# Patient Record
Sex: MALE | Race: WHITE | NOT HISPANIC OR LATINO | Employment: FULL TIME | URBAN - METROPOLITAN AREA
[De-identification: names, ages, dates, MRNs, and addresses within clinical notes are randomized per-mention and may not be internally consistent; named-entity substitution may affect disease eponyms.]

---

## 2017-01-01 ENCOUNTER — APPOINTMENT (OUTPATIENT)
Dept: RADIOLOGY | Facility: IMAGING CENTER | Age: 42
End: 2017-01-01
Attending: NURSE PRACTITIONER
Payer: COMMERCIAL

## 2017-01-01 ENCOUNTER — OFFICE VISIT (OUTPATIENT)
Dept: URGENT CARE | Facility: CLINIC | Age: 42
End: 2017-01-01
Payer: COMMERCIAL

## 2017-01-01 VITALS
DIASTOLIC BLOOD PRESSURE: 86 MMHG | SYSTOLIC BLOOD PRESSURE: 142 MMHG | HEART RATE: 110 BPM | BODY MASS INDEX: 30.75 KG/M2 | HEIGHT: 72 IN | RESPIRATION RATE: 16 BRPM | OXYGEN SATURATION: 97 % | WEIGHT: 227 LBS | TEMPERATURE: 97.5 F

## 2017-01-01 DIAGNOSIS — M79.672 FOOT PAIN, LEFT: ICD-10-CM

## 2017-01-01 PROCEDURE — 99202 OFFICE O/P NEW SF 15 MIN: CPT | Performed by: NURSE PRACTITIONER

## 2017-01-01 PROCEDURE — 73630 X-RAY EXAM OF FOOT: CPT | Mod: TC | Performed by: NURSE PRACTITIONER

## 2017-01-01 ASSESSMENT — ENCOUNTER SYMPTOMS
FEVER: 0
CHILLS: 0

## 2017-01-01 NOTE — PROGRESS NOTES
Subjective:      Lauro Daley is a 41 y.o. male who presents with Foot Pain    Past Medical History   Diagnosis Date   • Asthma      as a child     Social History     Social History   • Marital Status:      Spouse Name: N/A   • Number of Children: N/A   • Years of Education: N/A     Occupational History   • Not on file.     Social History Main Topics   • Smoking status: Never Smoker    • Smokeless tobacco: Current User     Types: Chew   • Alcohol Use: No   • Drug Use: No   • Sexual Activity: Not on file     Other Topics Concern   • Not on file     Social History Narrative   • No narrative on file     No family history on file.    Allergies: keflex    Current Outpatient Prescriptions on File Prior to Visit   Medication Sig Dispense Refill   • predniSONE (DELTASONE) 10 MG TABS Take 1 Tab by mouth every day. Take with food 40 mg po daily X3, 30 mg daily X 3 , 20 mg daily X 3, 10 mg daily X 3 , 5 mg daily X 3. In tapering dose fashion. (Patient not taking: Reported on 1/1/2017) 32 Each 0   • albuterol (VENTOLIN OR PROVENTIL) 108 (90 BASE) MCG/ACT AERS Inhale 1-2 Puffs by mouth every four hours as needed for Shortness of Breath (wheeze). (Patient not taking: Reported on 1/1/2017) 1 Inhaler 3   • azithromycin (ZITHROMAX) 500 MG tablet Take 0.5 Tabs by mouth every day. (Patient not taking: Reported on 1/1/2017) 4 Tab 0     No current facility-administered medications on file prior to visit.     Past Surgical History   Procedure Laterality Date   • Other       sleep apnea surgery     Patient presents with complaint of pain to the lateral aspect of the left foot over the last 2 months. He states this has become worse and has affected walking, as he has had pain with weightbearing. He denies knowing of any injury. He denies having had this type of pain before.          Other  This is a new problem. The current episode started more than 1 month ago. The problem occurs constantly. The problem has been gradually  worsening. Pertinent negatives include no chills or fever. Associated symptoms comments: Pain to the lateral aspect of the left foot. Nothing aggravates the symptoms. He has tried nothing for the symptoms. The treatment provided no relief.       Review of Systems   Constitutional: Negative for fever and chills.   Musculoskeletal:        Pain to the lateral left foot        All other systems reviewed and are negative      Objective:     /86 mmHg  Pulse 110  Temp(Src) 36.4 °C (97.5 °F)  Resp 16  Ht 1.829 m (6')  Wt 102.967 kg (227 lb)  BMI 30.78 kg/m2  SpO2 97%     Physical Exam   Constitutional: He is oriented to person, place, and time. He appears well-developed and well-nourished. No distress.   Musculoskeletal:        Left ankle: Tenderness.        Feet:    Neurological: He is alert and oriented to person, place, and time.   Skin: Skin is warm and dry. He is not diaphoretic.   Psychiatric: He has a normal mood and affect. His behavior is normal.   Vitals reviewed.    XR foot:      1/1/2017 2:34 PM    HISTORY/REASON FOR EXAM:  Left foot 5th metatarsal pain for 2 months      TECHNIQUE/EXAM DESCRIPTION AND NUMBER OF VIEWS:  3 views of the LEFT foot.    COMPARISON:  None    FINDINGS:    There is no focal soft tissue swelling.    There is no evidence of displaced fracture or dislocation.    The alignment is maintained.    There is an incidental bipartite lateral 1st MTP joint sesamoid.         Impression        1.  Unremarkable left foot series.               Assessment/Plan:     1. Foot pain, left    - DX-FOOT-COMPLETE 3+ LEFT; Future  -RICE  -ACE wrap; patient declined and states he will purchase a foot brace  -ibuprofen  -return for persistent or worsening of symptoms.

## 2017-01-01 NOTE — MR AVS SNAPSHOT
Lauro Alvarez Edi   2017 2:15 PM   Office Visit   MRN: [id number]    Department:  [facility]   Dept Phone:  [phone]    Description:  Male : 1975   Provider:  Cathey J Hamman, A.P.N.           Reason for Visit     Foot Pain x 2 months; L Foot, no hx of injury      Allergies as of 2017     No Known Allergies      You were diagnosed with     Foot pain, left   [[id number]]         Vital Signs     Blood Pressure Pulse Temperature Respirations Height Weight    142/86 mmHg 110 36.4 °C (97.5 °F) 16 1.829 m (6') 102.967 kg (227 lb)    Body Mass Index Oxygen Saturation Smoking Status             30.78 kg/m2 97% Never Smoker          Basic Information     Date Of Birth Sex Race Ethnicity Preferred Language    1975 Male White Non- English      Problem List              ICD-10-CM Priority Class Noted - Resolved    Hypoxia R09.02   2014 - Present    Asthma exacerbation J45.901   2014 - Present      Health Maintenance        Date Due Completion Dates    IMM DTaP/Tdap/Td Vaccine (1 - Tdap) 10/5/1992 ---    IMM PNEUMOCOCCAL 19-64 (ADULT) MEDIUM RISK SERIES (1 of 1 - PPSV23) 10/5/1992 ---    IMM INFLUENZA (1) 2016 ---            Current Immunizations     No immunizations on file.      Below and/or attached are the medications your provider expects you to take. Review all of your home medications and newly ordered medications with your provider and/or pharmacist. Follow medication instructions as directed by your provider and/or pharmacist. Please keep your medication list with you and share with your provider. Update the information when medications are discontinued, doses are changed, or new medications (including over-the-counter products) are added; and carry medication information at all times in the event of emergency situations     Allergies:  No Known Allergies          Medications  Valid as of: 2017 -  3:19 PM    Generic Name Brand Name Tablet Size  Instructions for use    Albuterol Sulfate (Aero Soln) albuterol 108 (90 BASE) MCG/ACT Inhale 1-2 Puffs by mouth every four hours as needed for Shortness of Breath (wheeze).        Azithromycin (Tab) ZITHROMAX 500 MG Take 0.5 Tabs by mouth every day.        PredniSONE (Tab) DELTASONE 10 MG Take 1 Tab by mouth every day. Take with food 40 mg po daily X3, 30 mg daily X 3 , 20 mg daily X 3, 10 mg daily X 3 , 5 mg daily X 3. In tapering dose fashion.        .                 Medicines prescribed today were sent to:     [facility]/PHARMACY #[id number] - [place], [place] - [address]    [address] [place] [address]    Phone: [phone] Fax: [phone]    Open 24 Hours?: No      Medication refill instructions:       If your prescription bottle indicates you have medication refills left, it is not necessary to call your provider’s office. Please contact your pharmacy and they will refill your medication.    If your prescription bottle indicates you do not have any refills left, you may request refills at any time through one of the following ways: The online Babytree system (except Urgent Care), by calling your provider’s office, or by asking your pharmacy to contact your provider’s office with a refill request. Medication refills are processed only during regular business hours and may not be available until the next business day. Your provider may request additional information or to have a follow-up visit with you prior to refilling your medication.   *Please Note: Medication refills are assigned a new Rx number when refilled electronically. Your pharmacy may indicate that no refills were authorized even though a new prescription for the same medication is available at the pharmacy. Please request the medicine by name with the pharmacy before contacting your provider for a refill.        Your To Do List     Future Labs/Procedures Complete By Expires    DX-FOOT-COMPLETE 3+ LEFT  As directed 1/1/2018         Babytree Access  Code: [email or web address]  Expires: 1/31/2017  3:19 PM    K Spine  A secure, online tool to manage your health information     [employer]’s K Spine® is a secure, online tool that connects you to your personalized health information from the privacy of your home -- day or night - making it very easy for you to manage your healthcare. Once the activation process is completed, you can even access your medical information using the K Spine shad, which is available for free in the Apple Shad store or Google Play store.     K Spine provides the following levels of access (as shown below):   My Chart Features   [facility] Primary Care Doctor [facility]  Specialists [facility]  Urgent  Care Non-[facility]  Primary Care  Doctor   Email your healthcare team securely and privately 24/7 X X X    Manage appointments: schedule your next appointment; view details of past/upcoming appointments X      Request prescription refills. X      View recent personal medical records, including lab and immunizations X X X X   View health record, including health history, allergies, medications X X X X   Read reports about your outpatient visits, procedures, consult and ER notes X X X X   See your discharge summary, which is a recap of your hospital and/or ER visit that includes your diagnosis, lab results, and care plan. X X       How to register for K Spine:  1. Go to  [email or web address].  2. Click on the Sign Up Now box, which takes you to the New Member Sign Up page. You will need to provide the following information:  a. Enter your K Spine Access Code exactly as it appears at the top of this page. (You will not need to use this code after you’ve completed the sign-up process. If you do not sign up before the expiration date, you must request a new code.)   b. Enter your date of birth.   c. Enter your home email address.   d. Click Submit, and follow the next screen’s instructions.  3. Create a K Spine ID. This will be your K Spine login ID and  cannot be changed, so think of one that is secure and easy to remember.  4. Create a [employer] password. You can change your password at any time.  5. Enter your Password Reset Question and Answer. This can be used at a later time if you forget your password.   6. Enter your e-mail address. This allows you to receive e-mail notifications when new information is available in [employer].  7. Click Sign Up. You can now view your health information.    For assistance activating your [employer] account, call [phone]

## 2017-03-15 ENCOUNTER — APPOINTMENT (OUTPATIENT)
Dept: RADIOLOGY | Facility: IMAGING CENTER | Age: 42
End: 2017-03-15
Attending: NURSE PRACTITIONER
Payer: COMMERCIAL

## 2017-03-15 ENCOUNTER — OFFICE VISIT (OUTPATIENT)
Dept: URGENT CARE | Facility: CLINIC | Age: 42
End: 2017-03-15
Payer: COMMERCIAL

## 2017-03-15 VITALS
TEMPERATURE: 98.6 F | SYSTOLIC BLOOD PRESSURE: 120 MMHG | OXYGEN SATURATION: 95 % | RESPIRATION RATE: 16 BRPM | WEIGHT: 215 LBS | HEART RATE: 105 BPM | BODY MASS INDEX: 29.12 KG/M2 | HEIGHT: 72 IN | DIASTOLIC BLOOD PRESSURE: 80 MMHG

## 2017-03-15 DIAGNOSIS — R10.9 ACUTE LEFT FLANK PAIN: ICD-10-CM

## 2017-03-15 DIAGNOSIS — K59.00 CONSTIPATION, UNSPECIFIED CONSTIPATION TYPE: ICD-10-CM

## 2017-03-15 LAB
APPEARANCE UR: CLEAR
BILIRUB UR STRIP-MCNC: NORMAL MG/DL
COLOR UR AUTO: NORMAL
GLUCOSE UR STRIP.AUTO-MCNC: NORMAL MG/DL
KETONES UR STRIP.AUTO-MCNC: NORMAL MG/DL
LEUKOCYTE ESTERASE UR QL STRIP.AUTO: NORMAL
NITRITE UR QL STRIP.AUTO: NORMAL
PH UR STRIP.AUTO: 6.5 [PH] (ref 5–8)
PROT UR QL STRIP: NORMAL MG/DL
RBC UR QL AUTO: NORMAL
SP GR UR STRIP.AUTO: 1.02
UROBILINOGEN UR STRIP-MCNC: NORMAL MG/DL

## 2017-03-15 PROCEDURE — 74000 DX-ABDOMEN-1 VIEW: CPT | Mod: TC | Performed by: NURSE PRACTITIONER

## 2017-03-15 PROCEDURE — 99214 OFFICE O/P EST MOD 30 MIN: CPT | Performed by: NURSE PRACTITIONER

## 2017-03-15 PROCEDURE — 81002 URINALYSIS NONAUTO W/O SCOPE: CPT | Performed by: NURSE PRACTITIONER

## 2017-03-15 ASSESSMENT — ENCOUNTER SYMPTOMS
ABDOMINAL PAIN: 1
DIARRHEA: 0
BLOOD IN STOOL: 0
COUGH: 0
MYALGIAS: 1
CONSTIPATION: 0
SHORTNESS OF BREATH: 0
BACK PAIN: 1
WEAKNESS: 0
NAUSEA: 1
FEVER: 0
FLANK PAIN: 1
CHILLS: 0
VOMITING: 0
ORTHOPNEA: 0
PALPITATIONS: 0

## 2017-03-15 NOTE — MR AVS SNAPSHOT
Lauro Alvarez Edi   3/15/2017 3:15 PM   Office Visit   MRN: [id number]    Department:  [facility]   Dept Phone:  [phone]    Description:  Male : 1975   Provider:  RACIEL Martinez           Reason for Visit     Flank Pain past 2days, started left back side radiating to front now, painful, not sleeping and making nauseous      Allergies as of 3/15/2017     Allergen Noted Reactions    Sulfa Drugs 03/15/2017   Rash    body      You were diagnosed with     Acute left flank pain   [[id number]]         Vital Signs     Blood Pressure Pulse Temperature Respirations Height Weight    120/80 mmHg 105 37 °C (98.6 °F) 16 1.829 m (6') 97.523 kg (215 lb)    Body Mass Index Oxygen Saturation Smoking Status             29.15 kg/m2 95% Never Smoker          Basic Information     Date Of Birth Sex Race Ethnicity Preferred Language    1975 Male White Non- English      Problem List              ICD-10-CM Priority Class Noted - Resolved    Hypoxia R09.02   2014 - Present    Asthma exacerbation J45.901   2014 - Present      Health Maintenance        Date Due Completion Dates    IMM DTaP/Tdap/Td Vaccine (1 - Tdap) 10/5/1992 ---    IMM PNEUMOCOCCAL 19-64 (ADULT) MEDIUM RISK SERIES (1 of 1 - PPSV23) 10/5/1992 ---    IMM INFLUENZA (1) 2016 ---            Results     POCT Urinalysis      Component Value Standard Range & Units    POC Color D.YELLOW Negative    POC Appearance CLEAR Negative    POC Leukocyte Esterase TRACE Negative    POC Nitrites NEG Negative    POC Urobiligen NEG Negative (0.2) mg/dL    POC Protein TRACE Negative mg/dL    POC Urine PH 6.5 5.0 - 8.0    POC Blood NEG Negative    POC Specific Gravity 1.020 <1.005 - >1.030    POC Ketones NEG Negative mg/dL    POC Biliruben NEG Negative mg/dL    POC Glucose NEG Negative mg/dL                        Current Immunizations     No immunizations on file.      Below and/or attached are the medications your provider expects you  to take. Review all of your home medications and newly ordered medications with your provider and/or pharmacist. Follow medication instructions as directed by your provider and/or pharmacist. Please keep your medication list with you and share with your provider. Update the information when medications are discontinued, doses are changed, or new medications (including over-the-counter products) are added; and carry medication information at all times in the event of emergency situations     Allergies:  SULFA DRUGS - Rash               Medications  Valid as of: March 15, 2017 -  4:17 PM    Generic Name Brand Name Tablet Size Instructions for use    Albuterol Sulfate (Aero Soln) albuterol 108 (90 BASE) MCG/ACT Inhale 1-2 Puffs by mouth every four hours as needed for Shortness of Breath (wheeze).        Azithromycin (Tab) ZITHROMAX 500 MG Take 0.5 Tabs by mouth every day.        PredniSONE (Tab) DELTASONE 10 MG Take 1 Tab by mouth every day. Take with food 40 mg po daily X3, 30 mg daily X 3 , 20 mg daily X 3, 10 mg daily X 3 , 5 mg daily X 3. In tapering dose fashion.        .                 Medicines prescribed today were sent to:     [facility]/PHARMACY #[id number] - [place], [place] - [address]    [address] [place] [address]    Phone: [phone] Fax: [phone]    Open 24 Hours?: No      Medication refill instructions:       If your prescription bottle indicates you have medication refills left, it is not necessary to call your provider’s office. Please contact your pharmacy and they will refill your medication.    If your prescription bottle indicates you do not have any refills left, you may request refills at any time through one of the following ways: The online Optimus system (except Urgent Care), by calling your provider’s office, or by asking your pharmacy to contact your provider’s office with a refill request. Medication refills are processed only during regular business hours and may not be available  until the next business day. Your provider may request additional information or to have a follow-up visit with you prior to refilling your medication.   *Please Note: Medication refills are assigned a new Rx number when refilled electronically. Your pharmacy may indicate that no refills were authorized even though a new prescription for the same medication is available at the pharmacy. Please request the medicine by name with the pharmacy before contacting your provider for a refill.        Your To Do List     Future Labs/Procedures Complete By Expires    [id number] VIEW  As directed 9/15/2017         Movigo Access Code: [email or web address]  Expires: 3/25/2017  1:19 PM    Movigo  A secure, online tool to manage your health information     [employer]’s Movigo® is a secure, online tool that connects you to your personalized health information from the privacy of your home -- day or night - making it very easy for you to manage your healthcare. Once the activation process is completed, you can even access your medical information using the Movigo shad, which is available for free in the Apple Shad store or Google Play store.     Movigo provides the following levels of access (as shown below):   My Chart Features   [facility] Primary Care Doctor [facility]  Specialists [facility]  Urgent  Care Non-[facility]  Primary Care  Doctor   Email your healthcare team securely and privately 24/7 X X X    Manage appointments: schedule your next appointment; view details of past/upcoming appointments X      Request prescription refills. X      View recent personal medical records, including lab and immunizations X X X X   View health record, including health history, allergies, medications X X X X   Read reports about your outpatient visits, procedures, consult and ER notes X X X X   See your discharge summary, which is a recap of your hospital and/or ER visit that includes your diagnosis, lab results, and care plan. X X       How to  register for XebiaLabs:  1. Go to  [email or web address].  2. Click on the Sign Up Now box, which takes you to the New Member Sign Up page. You will need to provide the following information:  a. Enter your XebiaLabs Access Code exactly as it appears at the top of this page. (You will not need to use this code after you’ve completed the sign-up process. If you do not sign up before the expiration date, you must request a new code.)   b. Enter your date of birth.   c. Enter your home email address.   d. Click Submit, and follow the next screen’s instructions.  3. Create a [employer] ID. This will be your XebiaLabs login ID and cannot be changed, so think of one that is secure and easy to remember.  4. Create a [employer] password. You can change your password at any time.  5. Enter your Password Reset Question and Answer. This can be used at a later time if you forget your password.   6. Enter your e-mail address. This allows you to receive e-mail notifications when new information is available in XebiaLabs.  7. Click Sign Up. You can now view your health information.    For assistance activating your XebiaLabs account, call [phone]

## 2017-07-21 PROBLEM — D23.5 OTHER BENIGN NEOPLASM OF SKIN OF TRUNK: Status: ACTIVE | Noted: 2017-07-21

## 2017-10-23 ENCOUNTER — OFFICE VISIT (OUTPATIENT)
Dept: URGENT CARE | Facility: CLINIC | Age: 42
End: 2017-10-23
Payer: COMMERCIAL

## 2017-10-23 VITALS
RESPIRATION RATE: 16 BRPM | HEIGHT: 72 IN | HEART RATE: 108 BPM | WEIGHT: 236 LBS | SYSTOLIC BLOOD PRESSURE: 154 MMHG | OXYGEN SATURATION: 95 % | TEMPERATURE: 98.6 F | DIASTOLIC BLOOD PRESSURE: 100 MMHG | BODY MASS INDEX: 31.97 KG/M2

## 2017-10-23 DIAGNOSIS — J01.90 ACUTE NON-RECURRENT SINUSITIS, UNSPECIFIED LOCATION: ICD-10-CM

## 2017-10-23 DIAGNOSIS — H69.93 DYSFUNCTION OF BOTH EUSTACHIAN TUBES: ICD-10-CM

## 2017-10-23 DIAGNOSIS — J20.9 ACUTE BRONCHITIS, UNSPECIFIED ORGANISM: ICD-10-CM

## 2017-10-23 DIAGNOSIS — H10.12 ALLERGIC CONJUNCTIVITIS OF LEFT EYE: ICD-10-CM

## 2017-10-23 PROCEDURE — 99214 OFFICE O/P EST MOD 30 MIN: CPT | Performed by: FAMILY MEDICINE

## 2017-10-23 RX ORDER — AMOXICILLIN AND CLAVULANATE POTASSIUM 875; 125 MG/1; MG/1
1 TABLET, FILM COATED ORAL 2 TIMES DAILY
Qty: 20 TAB | Refills: 0 | Status: SHIPPED | OUTPATIENT
Start: 2017-10-23 | End: 2017-11-02

## 2017-11-15 ENCOUNTER — OFFICE VISIT (OUTPATIENT)
Dept: URGENT CARE | Facility: CLINIC | Age: 42
End: 2017-11-15
Payer: COMMERCIAL

## 2017-11-15 VITALS
HEART RATE: 100 BPM | DIASTOLIC BLOOD PRESSURE: 70 MMHG | TEMPERATURE: 99.1 F | WEIGHT: 237 LBS | RESPIRATION RATE: 20 BRPM | OXYGEN SATURATION: 96 % | HEIGHT: 72 IN | BODY MASS INDEX: 32.1 KG/M2 | SYSTOLIC BLOOD PRESSURE: 110 MMHG

## 2017-11-15 DIAGNOSIS — R20.2 PARESTHESIA AND PAIN OF BOTH UPPER EXTREMITIES: ICD-10-CM

## 2017-11-15 DIAGNOSIS — M79.601 PARESTHESIA AND PAIN OF BOTH UPPER EXTREMITIES: ICD-10-CM

## 2017-11-15 DIAGNOSIS — M79.602 PARESTHESIA AND PAIN OF BOTH UPPER EXTREMITIES: ICD-10-CM

## 2017-11-15 PROCEDURE — 99213 OFFICE O/P EST LOW 20 MIN: CPT | Performed by: PHYSICIAN ASSISTANT

## 2017-11-15 ASSESSMENT — ENCOUNTER SYMPTOMS
MYALGIAS: 0
DOUBLE VISION: 0
BLURRED VISION: 0
HEADACHES: 0
COUGH: 0
PALPITATIONS: 0
SHORTNESS OF BREATH: 0
SENSORY CHANGE: 1
FOCAL WEAKNESS: 0
CHILLS: 0
FEVER: 0
TINGLING: 1
NAUSEA: 0
VOMITING: 0
DIZZINESS: 0

## 2017-11-16 NOTE — PROGRESS NOTES
Subjective:      Lauro Daley is a 42 y.o. male who presents with Numbness (Couple days ago hands and forearm numbness)            Patient reports intermittent numbness and pain in upper arms for the past year. He notes worsening pain over the past week especially in his right arm. He denies any recent injury. He complains of pain and tingling in his arms extending from his elbows into his hands. 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 He states his right arm is worse than his left. He is right handed. He denies alcohol use or smoking. He does chew tobacco. He states the pain is worse at night when he sleeps. Ibuprofen has helped his pain. He denies paralysis or weakness.      Past Medical History:   Diagnosis Date   • Asthma     as a child     ,  Past Surgical History:   Procedure Laterality Date   • OTHER      sleep apnea surgery       No family history on file.    Allergies   Allergen Reactions   • Sulfa Drugs Rash     body       Medications, Allergies, and current problem list reviewed today in Epic    Review of Systems   Constitutional: Negative for chills, fever and malaise/fatigue.   Eyes: Negative for blurred vision and double vision.   Respiratory: Negative for cough and shortness of breath.    Cardiovascular: Negative for chest pain, palpitations and leg swelling.   Gastrointestinal: Negative for nausea and vomiting.   Musculoskeletal: Negative for joint pain and myalgias.   Skin: Negative for rash.   Neurological: Positive for tingling and sensory change. Negative for dizziness, focal weakness and headaches.        Tingling and numbness in arms bilaterally with pain     All other systems reviewed and are negative.        Objective:     /70   Pulse 100   Temp 37.3 °C (99.1 °F)   Resp 20   Ht 1.829 m (6')   Wt 107.5 kg (237 lb)   SpO2 96%   BMI 32.14 kg/m²      Physical Exam   Constitutional: He is oriented to person, place, and time. He appears  well-developed and well-nourished. No distress.   Eyes: Conjunctivae are normal.   Cardiovascular: Normal rate, regular rhythm and normal heart sounds.  Exam reveals no gallop and no friction rub.    No murmur heard.  Pulmonary/Chest: Effort normal and breath sounds normal. No respiratory distress. He has no wheezes. He has no rales.   Musculoskeletal:        Right elbow: Normal.       Left elbow: Normal.        Right wrist: Normal.        Left wrist: Normal.   Bilateral upper extremities- FROM. Distal n/v intact. Pain with Tinel's over right cubital tunnel.  Pain in distribution of ulnar and median nerve on both hands.  strength normal bilaterally.    Neurological: He is alert and oriented to person, place, and time. No cranial nerve deficit.   Psychiatric: He has a normal mood and affect. His behavior is normal. Judgment and thought content normal.               Assessment/Plan:     1. Paresthesia and pain of both upper extremities  - patient presents with characteristic cubital tunnel syndrome and carpal tunnel syndrome bilaterally  - Advised OTC NSAIDS  - he was given wrist braces bilaterally.  - encouraged OTC right elbow sleeve  - will have patient follow-up with Ortho.   - REFERRAL TO ORTHOPEDICS     Differential diagnoses, Supportive care, and indications for immediate follow-up discussed with patient.   Instructed to return to clinic or nearest emergency department for any change in condition, further concerns, or worsening of symptoms.    The patient demonstrated a good understanding and agreed with the treatment plan.    Sharmila Kaye P.A.-C.

## 2018-06-24 ENCOUNTER — APPOINTMENT (OUTPATIENT)
Dept: RADIOLOGY | Facility: IMAGING CENTER | Age: 43
End: 2018-06-24
Attending: PHYSICIAN ASSISTANT
Payer: COMMERCIAL

## 2018-06-24 ENCOUNTER — OFFICE VISIT (OUTPATIENT)
Dept: URGENT CARE | Facility: CLINIC | Age: 43
End: 2018-06-24
Payer: COMMERCIAL

## 2018-06-24 VITALS
WEIGHT: 235 LBS | HEIGHT: 72 IN | SYSTOLIC BLOOD PRESSURE: 120 MMHG | BODY MASS INDEX: 31.83 KG/M2 | RESPIRATION RATE: 18 BRPM | TEMPERATURE: 99.1 F | OXYGEN SATURATION: 95 % | DIASTOLIC BLOOD PRESSURE: 80 MMHG | HEART RATE: 100 BPM

## 2018-06-24 DIAGNOSIS — J01.00 ACUTE NON-RECURRENT MAXILLARY SINUSITIS: ICD-10-CM

## 2018-06-24 DIAGNOSIS — R05.9 COUGH: ICD-10-CM

## 2018-06-24 DIAGNOSIS — E66.9 OBESITY (BMI 30-39.9): ICD-10-CM

## 2018-06-24 PROCEDURE — 99214 OFFICE O/P EST MOD 30 MIN: CPT | Performed by: PHYSICIAN ASSISTANT

## 2018-06-24 PROCEDURE — 71046 X-RAY EXAM CHEST 2 VIEWS: CPT | Mod: TC,FY | Performed by: PHYSICIAN ASSISTANT

## 2018-06-24 RX ORDER — AMOXICILLIN AND CLAVULANATE POTASSIUM 875; 125 MG/1; MG/1
1 TABLET, FILM COATED ORAL 2 TIMES DAILY
Qty: 14 TAB | Refills: 0 | Status: SHIPPED | OUTPATIENT
Start: 2018-06-24 | End: 2018-07-01

## 2018-06-24 ASSESSMENT — ENCOUNTER SYMPTOMS
CHILLS: 0
SPUTUM PRODUCTION: 1
SINUS PRESSURE: 1
PALPITATIONS: 0
WHEEZING: 0
SORE THROAT: 1
SHORTNESS OF BREATH: 0
HEMOPTYSIS: 0
FEVER: 0
COUGH: 1

## 2018-06-24 ASSESSMENT — PATIENT HEALTH QUESTIONNAIRE - PHQ9: CLINICAL INTERPRETATION OF PHQ2 SCORE: 0

## 2018-06-24 NOTE — PROGRESS NOTES
Subjective:      Lauro Daley is a 42 y.o. male who presents with Cough (Almost 10 days stuffy nose , dry cough .)            Sinus Problem   This is a new problem. The current episode started 1 to 4 weeks ago. The problem is unchanged. The pain is moderate. Associated symptoms include congestion, coughing, sinus pressure and a sore throat. Pertinent negatives include no chills, ear pain or shortness of breath. Past treatments include antibiotics and oral decongestants. The treatment provided no relief.       Review of Systems   Constitutional: Positive for malaise/fatigue. Negative for chills and fever.   HENT: Positive for congestion, sinus pressure and sore throat. Negative for ear pain.    Respiratory: Positive for cough and sputum production. Negative for hemoptysis, shortness of breath and wheezing.    Cardiovascular: Negative for chest pain and palpitations.   All other systems reviewed and are negative.    PMH:  has a past medical history of Asthma.  MEDS:   Current Outpatient Prescriptions:   •  amoxicillin-clavulanate (AUGMENTIN) 875-125 MG Tab, Take 1 Tab by mouth 2 times a day for 7 days., Disp: 14 Tab, Rfl: 0  •  Hydrocod Polst-CPM Polst ER (TUSSIONEX PENNKINETIC ER) 10-8 MG/5ML Suspension Extended Release, Take 5 mL by mouth every 12 hours for 10 days., Disp: 100 mL, Rfl: 0  •  Hydrocod Polst-CPM Polst ER (TUSSIONEX) 10-8 MG/5ML Suspension Extended Release, Take 5 mL by mouth every 12 hours as needed for Cough or Rhinitis. Will cause sedation, avoid driving, operating heavy machinery, and drinking alcohol (Patient not taking: Reported on 6/24/2018), Disp: 60 mL, Rfl: 0  •  predniSONE (DELTASONE) 10 MG TABS, Take 1 Tab by mouth every day. Take with food 40 mg po daily X3, 30 mg daily X 3 , 20 mg daily X 3, 10 mg daily X 3 , 5 mg daily X 3. In tapering dose fashion. (Patient not taking: Reported on 6/24/2018), Disp: 32 Each, Rfl: 0  •  albuterol (VENTOLIN OR PROVENTIL) 108 (90 BASE) MCG/ACT  AERS, Inhale 1-2 Puffs by mouth every four hours as needed for Shortness of Breath (wheeze). (Patient not taking: Reported on 6/24/2018), Disp: 1 Inhaler, Rfl: 3  •  azithromycin (ZITHROMAX) 500 MG tablet, Take 0.5 Tabs by mouth every day. (Patient not taking: Reported on 6/24/2018), Disp: 4 Tab, Rfl: 0  ALLERGIES:   Allergies   Allergen Reactions   • Sulfa Drugs Rash     body     SURGHX:   Past Surgical History:   Procedure Laterality Date   • OTHER      sleep apnea surgery     SOCHX:  reports that he has never smoked. His smokeless tobacco use includes Chew. He reports that he does not drink alcohol or use drugs.  FH: Family history was reviewed, no pertinent findings to report  Medications, Allergies, and current problem list reviewed today in Epic       Objective:     /80   Pulse 100   Temp 37.3 °C (99.1 °F)   Resp 18   Ht 1.829 m (6')   Wt 106.6 kg (235 lb)   SpO2 95%   BMI 31.87 kg/m²      Physical Exam   Constitutional: He is oriented to person, place, and time. He appears well-developed and well-nourished.  Non-toxic appearance. He does not have a sickly appearance. He does not appear ill. No distress.   HENT:   Head: Normocephalic and atraumatic.   Right Ear: Hearing, tympanic membrane, external ear and ear canal normal.   Left Ear: Hearing, tympanic membrane, external ear and ear canal normal.   Nose: Mucosal edema and rhinorrhea present. No sinus tenderness. No epistaxis. Right sinus exhibits maxillary sinus tenderness. Left sinus exhibits maxillary sinus tenderness.   Mouth/Throat: Uvula is midline, oropharynx is clear and moist and mucous membranes are normal.   Eyes: Conjunctivae and EOM are normal.   Neck: Normal range of motion. Neck supple.   Cardiovascular: Normal rate, regular rhythm, normal heart sounds and intact distal pulses.    Pulmonary/Chest: Effort normal and breath sounds normal.   Neurological: He is alert and oriented to person, place, and time.   Skin: Skin is warm and  dry.   Psychiatric: He has a normal mood and affect. His behavior is normal. Judgment and thought content normal.   Vitals reviewed.           6/24/2018 12:19 PM    HISTORY/REASON FOR EXAM:  Cough  Congestion    TECHNIQUE/EXAM DESCRIPTION AND NUMBER OF VIEWS:  Two views of the chest.    COMPARISON:  4/13/2014    FINDINGS:  Cardiomediastinal contour is within normal limits.  Lungs show hypoinflation.  No focal pulmonary consolidation.  No pleural fluid collection or pneumothorax.  Nodular density at the RIGHT lung base measuring approximately 9 mm.  No major bony abnormality is seen.   Impression       1.  Hypoinflation without other evidence for acute cardiopulmonary disease.  2.  RIGHT lung base nodule versus nipple shadow.  Recommend confirmation with followup chest x-ray with nipple markers and shallow oblique views upon resolution of patient's acute illness.        Assessment/Plan:   Patient is a 42-year-old male who presents with sinus congestion and cough for 2 weeks. He was seen by telemedicine was prescribed azithromycin for his symptoms. He reports worsening symptoms without the help of medications. He has a past medical history of pneumonia. Vitals normal lungs are clear to auscultation there is some tenderness with palpation of the sinuses. X-ray was done which was negative there was incidental finding of a possible nodule. He states that he has nodules throughout his entire body and is not on anything. We will treat for sinusitis.  1. Cough    - DX-CHEST-2 VIEWS; Future  - amoxicillin-clavulanate (AUGMENTIN) 875-125 MG Tab; Take 1 Tab by mouth 2 times a day for 7 days.  Dispense: 14 Tab; Refill: 0  - Hydrocod Polst-CPM Polst ER (TUSSIONEX PENNKINETIC ER) 10-8 MG/5ML Suspension Extended Release; Take 5 mL by mouth every 12 hours for 10 days.  Dispense: 100 mL; Refill: 0    2. Acute non-recurrent maxillary sinusitis    - DX-CHEST-2 VIEWS; Future  - amoxicillin-clavulanate (AUGMENTIN) 875-125 MG Tab; Take 1  Tab by mouth 2 times a day for 7 days.  Dispense: 14 Tab; Refill: 0  - Hydrocod Polst-CPM Polst ER (TUSSIONEX PENNKINETIC ER) 10-8 MG/5ML Suspension Extended Release; Take 5 mL by mouth every 12 hours for 10 days.  Dispense: 100 mL; Refill: 0    3. Obesity (BMI 30-39.9)    - Patient identified as having weight management issue.  Appropriate orders and counseling given.    Differential diagnosis, natural history, supportive care discussed. Follow-up with primary care provider within 7-10 days, emergency room precautions discussed.  Patient and/or family appears understanding of information.  Handout and review of patients diagnosis and treatment was discussed extensively.

## 2018-09-16 ENCOUNTER — OFFICE VISIT (OUTPATIENT)
Dept: URGENT CARE | Facility: CLINIC | Age: 43
End: 2018-09-16
Payer: COMMERCIAL

## 2018-09-16 ENCOUNTER — HOSPITAL ENCOUNTER (OUTPATIENT)
Facility: MEDICAL CENTER | Age: 43
End: 2018-09-16
Attending: FAMILY MEDICINE
Payer: COMMERCIAL

## 2018-09-16 VITALS
HEART RATE: 104 BPM | BODY MASS INDEX: 31.83 KG/M2 | TEMPERATURE: 98 F | HEIGHT: 72 IN | OXYGEN SATURATION: 98 % | WEIGHT: 235 LBS | RESPIRATION RATE: 14 BRPM | SYSTOLIC BLOOD PRESSURE: 154 MMHG | DIASTOLIC BLOOD PRESSURE: 94 MMHG
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
## 2018-09-16 DIAGNOSIS — L03.012 PARONYCHIA OF FINGER, LEFT: ICD-10-CM

## 2018-09-16 PROCEDURE — 99000 SPECIMEN HANDLING OFFICE-LAB: CPT | Performed by: FAMILY MEDICINE

## 2018-09-16 PROCEDURE — 87077 CULTURE AEROBIC IDENTIFY: CPT | Mod: 91

## 2018-09-16 PROCEDURE — 87070 CULTURE OTHR SPECIMN AEROBIC: CPT

## 2018-09-16 PROCEDURE — 87186 SC STD MICRODIL/AGAR DIL: CPT

## 2018-09-16 PROCEDURE — 99214 OFFICE O/P EST MOD 30 MIN: CPT | Performed by: FAMILY MEDICINE

## 2018-09-16 RX ORDER — CIPROFLOXACIN 500 MG/1
500 TABLET, FILM COATED ORAL 2 TIMES DAILY
Qty: 10 TAB | Refills: 0 | Status: SHIPPED | OUTPATIENT
Start: 2018-09-16 | End: 2018-09-21

## 2018-09-16 RX ORDER — AMOXICILLIN AND CLAVULANATE POTASSIUM 875; 125 MG/1; MG/1
1 TABLET, FILM COATED ORAL 2 TIMES DAILY
Qty: 10 TAB | Refills: 0 | Status: SHIPPED | OUTPATIENT
Start: 2018-09-16 | End: 2018-09-21

## 2018-09-16 NOTE — PROGRESS NOTES
Subjective:      Lauro Daley is a 42 y.o. male who presents with Finger Pain (x2days ago possible infection painful, pig bit him x1wk ago)      - This is a very pleasant, well and non-toxic appearing 42 y.o. male with complaints of Lt ring finger infected. Says 2 wks ago his pet tea-cup pig bit him in that area when he was trying to put a leash on pig for 1st time.     Unsure of last td booster but says he does not want one today           ALLERGIES:  Sulfa drugs     PMH:  Past Medical History:   Diagnosis Date   • Asthma     as a child        MEDS:    Current Outpatient Prescriptions:   •  ciprofloxacin (CIPRO) 500 MG Tab, Take 1 Tab by mouth 2 times a day for 5 days., Disp: 10 Tab, Rfl: 0  •  amoxicillin-clavulanate (AUGMENTIN) 875-125 MG Tab, Take 1 Tab by mouth 2 times a day for 5 days., Disp: 10 Tab, Rfl: 0    ** I have documented what I find to be significant in regards to past medical, social, family and surgical history  in my HPI or under PMH/PSH/FH review section, otherwise it is contributory **           HPI    Review of Systems   All other systems reviewed and are negative.         Objective:     /94   Pulse (!) 104   Temp 36.7 °C (98 °F)   Resp 14   Ht 1.829 m (6')   Wt 106.6 kg (235 lb)   SpO2 98%   BMI 31.87 kg/m²      Physical Exam   Constitutional: He appears well-developed. No distress.   HENT:   Head: Normocephalic and atraumatic.   Mouth/Throat: Oropharynx is clear and moist.   Eyes: Conjunctivae are normal.   Neck: Neck supple.   Cardiovascular: Regular rhythm.    No murmur heard.  Pulmonary/Chest: Effort normal. No respiratory distress.   Neurological: He is alert. He exhibits normal muscle tone.   Skin: Skin is warm and dry.   Psychiatric: He has a normal mood and affect. Judgment normal.   Nursing note and vitals reviewed.  Lt ring finger: + paronychia radial side nail fold. Good SROM.             Assessment/Plan:         1. Bitten by pig  ciprofloxacin (CIPRO) 500 MG  Tab    amoxicillin-clavulanate (AUGMENTIN) 875-125 MG Tab   2. Paronychia of finger, left  CULTURE WOUND W/O GRAM STAIN             Dx & d/c instructions discussed w/ patient and/or family members.     ER precautions (worsening signs symptoms and when to go to ER) discussed.    Follow up w/ PCP in 2-3 days to make sure symptoms improving and no further intervention/treatment and/or work-up needed was advised, ER if feeling worse or not improving in 2 days.    Possible side effects (i.e. Rash, GI upset/constipation, sedation, elevation of BP or sugars) of any medications given discussed.     Patient left in stable condition         Procedure: I & D  simple      - Tip of a #11 scalpel was used to paula lesion at most fluctuant site.   - Topical antibiotic and dressing applied.  - Patient tolerated the  procedure well, wound care discussed, warm epsom soaks qid, 2 day recheck advised

## 2018-09-19 LAB
BACTERIA WND AEROBE CULT: ABNORMAL
SIGNIFICANT IND 70042: ABNORMAL
SITE SITE: ABNORMAL
SOURCE SOURCE: ABNORMAL

## 2018-12-06 ENCOUNTER — OFFICE VISIT (OUTPATIENT)
Dept: URGENT CARE | Facility: CLINIC | Age: 43
End: 2018-12-06
Payer: COMMERCIAL

## 2018-12-06 ENCOUNTER — APPOINTMENT (OUTPATIENT)
Dept: RADIOLOGY | Facility: IMAGING CENTER | Age: 43
End: 2018-12-06
Attending: PHYSICIAN ASSISTANT
Payer: COMMERCIAL

## 2018-12-06 VITALS
HEART RATE: 98 BPM | DIASTOLIC BLOOD PRESSURE: 100 MMHG | BODY MASS INDEX: 31.15 KG/M2 | OXYGEN SATURATION: 97 % | RESPIRATION RATE: 16 BRPM | HEIGHT: 72 IN | TEMPERATURE: 98.4 F | SYSTOLIC BLOOD PRESSURE: 136 MMHG | WEIGHT: 230 LBS

## 2018-12-06 DIAGNOSIS — J06.9 URI WITH COUGH AND CONGESTION: ICD-10-CM

## 2018-12-06 DIAGNOSIS — J40 BRONCHITIS: Primary | ICD-10-CM

## 2018-12-06 PROCEDURE — 99214 OFFICE O/P EST MOD 30 MIN: CPT | Performed by: PHYSICIAN ASSISTANT

## 2018-12-06 PROCEDURE — 71046 X-RAY EXAM CHEST 2 VIEWS: CPT | Mod: TC,FY | Performed by: PHYSICIAN ASSISTANT

## 2018-12-06 RX ORDER — PREDNISONE 20 MG/1
TABLET ORAL
Qty: 23 TAB | Refills: 0 | Status: SHIPPED | OUTPATIENT
Start: 2018-12-06 | End: 2020-10-06

## 2018-12-06 RX ORDER — DOXYCYCLINE HYCLATE 100 MG
100 TABLET ORAL 2 TIMES DAILY
Qty: 14 TAB | Refills: 0 | Status: SHIPPED | OUTPATIENT
Start: 2018-12-06 | End: 2018-12-13

## 2018-12-06 RX ORDER — PROMETHAZINE HYDROCHLORIDE AND CODEINE PHOSPHATE 6.25; 1 MG/5ML; MG/5ML
5 SYRUP ORAL 4 TIMES DAILY PRN
Qty: 120 ML | Refills: 0 | Status: SHIPPED | OUTPATIENT
Start: 2018-12-06 | End: 2018-12-13

## 2018-12-06 NOTE — PATIENT INSTRUCTIONS
Acute Bronchitis, Adult  Acute bronchitis is when air tubes (bronchi) in the lungs suddenly get swollen. The condition can make it hard to breathe. It can also cause these symptoms:  · A cough.  · Coughing up clear, yellow, or green mucus.  · Wheezing.  · Chest congestion.  · Shortness of breath.  · A fever.  · Body aches.  · Chills.  · A sore throat.  Follow these instructions at home:  Medicines  · Take over-the-counter and prescription medicines only as told by your doctor.  · If you were prescribed an antibiotic medicine, take it as told by your doctor. Do not stop taking the antibiotic even if you start to feel better.  General instructions  · Rest.  · Drink enough fluids to keep your pee (urine) clear or pale yellow.  · Avoid smoking and secondhand smoke. If you smoke and you need help quitting, ask your doctor. Quitting will help your lungs heal faster.  · Use an inhaler, cool mist vaporizer, or humidifier as told by your doctor.  · Keep all follow-up visits as told by your doctor. This is important.  How is this prevented?  To lower your risk of getting this condition again:  · Wash your hands often with soap and water. If you cannot use soap and water, use hand .  · Avoid contact with people who have cold symptoms.  · Try not to touch your hands to your mouth, nose, or eyes.  · Make sure to get the flu shot every year.  Contact a doctor if:  · Your symptoms do not get better in 2 weeks.  Get help right away if:  · You cough up blood.  · You have chest pain.  · You have very bad shortness of breath.  · You become dehydrated.  · You faint (pass out) or keep feeling like you are going to pass out.  · You keep throwing up (vomiting).  · You have a very bad headache.  · Your fever or chills gets worse.  This information is not intended to replace advice given to you by your health care provider. Make sure you discuss any questions you have with your health care provider.  Document Released: 06/05/2009  Document Revised: 07/26/2017 Document Reviewed: 06/07/2017  [email or web address] Interactive Patient Education © 2017 [employer]

## 2018-12-06 NOTE — PROGRESS NOTES
Subjective:      Pt is a 43 y.o. male who presents with Sinus Problem (X 1 MONTH) and Cough            HPI  This is a new problem. PT presents to  clinic today complaining of sore throat, fevers, pressure in ears, cough, fatigue, runny nose, wheezing and SOB. PT denies CP, NVD, abdominal pain, joint pain. PT states these symptoms began around 1 month ago. PT states the pain is a 5/10 with coughing fits, aching in nature and worse at night. Pt has not taken any RX medications for this condition. The pt's medication list, problem list, and allergies have been evaluated and reviewed during today's visit.    PMH:  Past Medical History:   Diagnosis Date   • Asthma     as a child       PSH:  Past Surgical History:   Procedure Laterality Date   • OTHER      sleep apnea surgery       Fam Hx:  the patient's family history is not pertinent to their current complaint      Soc HX:  Social History     Social History   • Marital status:      Spouse name: N/A   • Number of children: N/A   • Years of education: N/A     Occupational History   • Not on file.     Social History Main Topics   • Smoking status: Never Smoker   • Smokeless tobacco: Current User     Types: Chew   • Alcohol use No   • Drug use: No   • Sexual activity: Not on file     Other Topics Concern   • Not on file     Social History Narrative   • No narrative on file         Medications:    Current Outpatient Prescriptions:   •  doxycycline (VIBRAMYCIN) 100 MG Tab, Take 1 Tab by mouth 2 times a day for 7 days., Disp: 14 Tab, Rfl: 0  •  promethazine-codeine (PHENERGAN-CODEINE) 6.25-10 MG/5ML Syrup, Take 5 mL by mouth 4 times a day as needed for up to 7 days., Disp: 120 mL, Rfl: 0  •  predniSONE (DELTASONE) 20 MG Tab, Take 3 tabs at once PO daily x 5 days, then take 2 tabs at once daily x 3 days, then take 1 tab PO daily x 2 days, Disp: 23 Tab, Rfl: 0      Allergies:  Sulfa drugs    ROS  Review of Systems   Constitutional: Positive for malaise/fatigue.  Negative for fever.   HENT: Positive for congestion and sore throat from postnasal drip with associated sinus pressure and fullness.    Eyes: Negative for blurred vision, double vision and photophobia.   Respiratory: Positive for cough and sputum production. Negative for hemoptysis, shortness of breath and wheezing.    Cardiovascular: Negative for chest pain and palpitations.   Gastrointestinal: Negative for nausea, vomiting, abdominal pain, diarrhea and constipation.   Genitourinary: Negative for dysuria and flank pain.   Musculoskeletal: Negative for joint pain and myalgias.   Skin: Negative for itching and rash.   Neurological: Positive for headaches. Negative for dizziness and tingling.   Endo/Heme/Allergies: Does not bruise/bleed easily.   Psychiatric/Behavioral: Negative for depression. The patient is not nervous/anxious.           Objective:     /100 (BP Location: Left arm, Patient Position: Sitting, BP Cuff Size: Adult)   Pulse 98   Temp 36.9 °C (98.4 °F) (Temporal)   Resp 16   Ht 1.829 m (6')   Wt 104.3 kg (230 lb)   SpO2 97%   BMI 31.19 kg/m²      Physical Exam       Physical Exam   Constitutional: PT is oriented to person, place, and time. PT appears well-developed and well-nourished. No distress.   HENT:   Head: Normocephalic and atraumatic.   Right Ear: Hearing, tympanic membrane, external ear and ear canal normal.   Left Ear: Hearing, tympanic membrane, external ear and ear canal normal.   Nose: Mucosal edema, rhinorrhea and sinus tenderness present. Right sinus exhibits frontal sinus tenderness. Left sinus exhibits frontal sinus tenderness.   Mouth/Throat: Uvula is midline. Mucous membranes are pale. Posterior oropharyngeal edema and posterior oropharyngeal erythema present. No oropharyngeal exudate.   Eyes: Conjunctivae normal and EOM are normal. Pupils are equal, round, and reactive to light. Right eye exhibits no discharge. Left eye exhibits no discharge.   Neck: Normal range of  motion. Neck supple. No thyromegaly present.   Cardiovascular: Normal rate, regular rhythm, normal heart sounds and intact distal pulses.  Exam reveals no gallop and no friction rub.    No murmur heard.  Pulmonary/Chest: Effort normal. No respiratory distress. PT has wheezes. PT has no rales. PT exhibits tenderness.   Abdominal: Soft. Bowel sounds are normal. PT exhibits no distension and no mass. There is no tenderness. There is no rebound and no guarding.   Musculoskeletal: Normal range of motion. PT exhibits no edema and no tenderness.   Lymphadenopathy:     PT has no cervical adenopathy.   Neurological: Pt is alert and oriented to person, place, and time. Pt has normal reflexes. No cranial nerve deficit.   Skin: Skin is warm and dry. No rash noted. No erythema.   Psychiatric: PT has a normal mood and affect. Pt behavior is normal. Judgment and thought content normal.        Assessment/Plan:     1. Bronchitis    - doxycycline (VIBRAMYCIN) 100 MG Tab; Take 1 Tab by mouth 2 times a day for 7 days.  Dispense: 14 Tab; Refill: 0  - predniSONE (DELTASONE) 20 MG Tab; Take 3 tabs at once PO daily x 5 days, then take 2 tabs at once daily x 3 days, then take 1 tab PO daily x 2 days  Dispense: 23 Tab; Refill: 0    2. URI with cough and congestion    - DX-CHEST-2 VIEWS; Future  - promethazine-codeine (PHENERGAN-CODEINE) 6.25-10 MG/5ML Syrup; Take 5 mL by mouth 4 times a day as needed for up to 7 days.  Dispense: 120 mL; Refill: 0  - predniSONE (DELTASONE) 20 MG Tab; Take 3 tabs at once PO daily x 5 days, then take 2 tabs at once daily x 3 days, then take 1 tab PO daily x 2 days  Dispense: 23 Tab; Refill: 0    Rest, fluids encouraged.  OTC decongestant for congestion/cough  AVS with medical info given.  Pt was in full understanding and agreement with the plan.  Follow-up as needed if symptoms worsen or fail to improve.

## 2019-03-20 ENCOUNTER — NON-PROVIDER VISIT (OUTPATIENT)
Dept: OCCUPATIONAL MEDICINE | Facility: CLINIC | Age: 44
End: 2019-03-20

## 2019-03-20 VITALS
WEIGHT: 233.6 LBS | HEIGHT: 72 IN | TEMPERATURE: 98.8 F | BODY MASS INDEX: 31.64 KG/M2 | OXYGEN SATURATION: 96 % | HEART RATE: 114 BPM

## 2019-03-20 DIAGNOSIS — Z71.84 TRAVEL ADVICE ENCOUNTER: ICD-10-CM

## 2019-03-20 DIAGNOSIS — Z23 ENCOUNTER FOR IMMUNIZATION: ICD-10-CM

## 2019-03-20 PROCEDURE — 90691 TYPHOID VACCINE IM: CPT | Performed by: PREVENTIVE MEDICINE

## 2019-03-20 PROCEDURE — 90632 HEPA VACCINE ADULT IM: CPT | Performed by: PREVENTIVE MEDICINE

## 2019-03-20 PROCEDURE — 90471 IMMUNIZATION ADMIN: CPT | Performed by: PREVENTIVE MEDICINE

## 2019-03-20 PROCEDURE — 90715 TDAP VACCINE 7 YRS/> IM: CPT | Performed by: PREVENTIVE MEDICINE

## 2019-03-20 PROCEDURE — 90472 IMMUNIZATION ADMIN EACH ADD: CPT | Performed by: PREVENTIVE MEDICINE

## 2019-03-20 NOTE — PROGRESS NOTES
Travel dates: 4/21/49-04/28/19  Countries to be visited:  [place]  Reason for travel:  pleasure- pt will be traveling with wife to [facility] while she is on a business trip.    Rural travel: no  High altitude travel: no    Accommodations:  Hotel: yes   Hostel:  Camping:  Cruise:  With family:  Other:    Vaccines in past 30 days? No  Sick today? No  Allergies: Sulfa drugs    The screening intake form was reviewed with the traveler. Health risks associated with their travel plans have been reviewed and discussed with the traveler. The traveler has been provided with vaccine information statements for the vaccines that are recommended and given the opportunity to discuss risks and benefits of vaccination and or medications. The traveler has received education on the travel itinerary provided and on the following topics.    Personal safety precautions: Yes  Food and water precautions: Yes  Management of traveler's diarrhea: Yes  Mosquito/insect bite prevention:Yes  Animal bites/Rabies prevention: No  High altitude precautions: No      RN comments:       Hep A, Typhoid, and Tdap vaccine administered, vis given.    Travelers diarrhea self tx recommended. Patient aware of risks and benefits but declines medication at this time.             Physician consultation required: no

## 2019-05-10 ENCOUNTER — APPOINTMENT (RX ONLY)
Dept: URBAN - METROPOLITAN AREA CLINIC 4 | Facility: CLINIC | Age: 44
Setting detail: DERMATOLOGY
End: 2019-05-10

## 2019-05-10 DIAGNOSIS — L81.4 OTHER MELANIN HYPERPIGMENTATION: ICD-10-CM

## 2019-05-10 DIAGNOSIS — D17 BENIGN LIPOMATOUS NEOPLASM: ICD-10-CM

## 2019-05-10 DIAGNOSIS — L57.0 ACTINIC KERATOSIS: ICD-10-CM

## 2019-05-10 DIAGNOSIS — D18.0 HEMANGIOMA: ICD-10-CM

## 2019-05-10 DIAGNOSIS — D22 MELANOCYTIC NEVI: ICD-10-CM

## 2019-05-10 DIAGNOSIS — L73.8 OTHER SPECIFIED FOLLICULAR DISORDERS: ICD-10-CM

## 2019-05-10 PROBLEM — D23.39 OTHER BENIGN NEOPLASM OF SKIN OF OTHER PARTS OF FACE: Status: ACTIVE | Noted: 2019-05-10

## 2019-05-10 PROBLEM — D22.62 MELANOCYTIC NEVI OF LEFT UPPER LIMB, INCLUDING SHOULDER: Status: ACTIVE | Noted: 2019-05-10

## 2019-05-10 PROBLEM — D22.5 MELANOCYTIC NEVI OF TRUNK: Status: ACTIVE | Noted: 2019-05-10

## 2019-05-10 PROBLEM — D18.01 HEMANGIOMA OF SKIN AND SUBCUTANEOUS TISSUE: Status: ACTIVE | Noted: 2019-05-10

## 2019-05-10 PROBLEM — D17.22 BENIGN LIPOMATOUS NEOPLASM OF SKIN AND SUBCUTANEOUS TISSUE OF LEFT ARM: Status: ACTIVE | Noted: 2019-05-10

## 2019-05-10 PROBLEM — D17.21 BENIGN LIPOMATOUS NEOPLASM OF SKIN AND SUBCUTANEOUS TISSUE OF RIGHT ARM: Status: ACTIVE | Noted: 2019-05-10

## 2019-05-10 PROBLEM — D22.61 MELANOCYTIC NEVI OF RIGHT UPPER LIMB, INCLUDING SHOULDER: Status: ACTIVE | Noted: 2019-05-10

## 2019-05-10 PROBLEM — D17.1 BENIGN LIPOMATOUS NEOPLASM OF SKIN AND SUBCUTANEOUS TISSUE OF TRUNK: Status: ACTIVE | Noted: 2019-05-10

## 2019-05-10 PROCEDURE — ? LIQUID NITROGEN

## 2019-05-10 PROCEDURE — 99213 OFFICE O/P EST LOW 20 MIN: CPT | Mod: 25

## 2019-05-10 PROCEDURE — ? COUNSELING

## 2019-05-10 PROCEDURE — ? ADDITIONAL NOTES

## 2019-05-10 PROCEDURE — ? PATIENT SPECIFIC COUNSELING

## 2019-05-10 PROCEDURE — 17000 DESTRUCT PREMALG LESION: CPT

## 2019-05-10 ASSESSMENT — LOCATION SIMPLE DESCRIPTION DERM
LOCATION SIMPLE: RIGHT UPPER ARM
LOCATION SIMPLE: RIGHT CHEEK
LOCATION SIMPLE: ABDOMEN
LOCATION SIMPLE: LEFT HAND
LOCATION SIMPLE: CHEST
LOCATION SIMPLE: LEFT FOREARM
LOCATION SIMPLE: RIGHT HAND
LOCATION SIMPLE: LOWER BACK
LOCATION SIMPLE: LEFT UPPER BACK
LOCATION SIMPLE: RIGHT LOWER BACK
LOCATION SIMPLE: RIGHT FOREARM
LOCATION SIMPLE: LEFT UPPER ARM
LOCATION SIMPLE: LEFT CHEEK

## 2019-05-10 ASSESSMENT — LOCATION DETAILED DESCRIPTION DERM
LOCATION DETAILED: LEFT INFERIOR CENTRAL MALAR CHEEK
LOCATION DETAILED: LEFT MEDIAL INFERIOR CHEST
LOCATION DETAILED: RIGHT VENTRAL DISTAL FOREARM
LOCATION DETAILED: LEFT ANTERIOR DISTAL UPPER ARM
LOCATION DETAILED: LEFT LATERAL ABDOMEN
LOCATION DETAILED: RIGHT DISTAL DORSAL FOREARM
LOCATION DETAILED: RIGHT RADIAL DORSAL HAND
LOCATION DETAILED: SUPERIOR LUMBAR SPINE
LOCATION DETAILED: RIGHT LATERAL INFERIOR CHEST
LOCATION DETAILED: LEFT VENTRAL DISTAL FOREARM
LOCATION DETAILED: RIGHT SUPERIOR MEDIAL MIDBACK
LOCATION DETAILED: RIGHT CENTRAL MALAR CHEEK
LOCATION DETAILED: RIGHT ANTERIOR DISTAL UPPER ARM
LOCATION DETAILED: PERIUMBILICAL SKIN
LOCATION DETAILED: LEFT ANTERIOR PROXIMAL UPPER ARM
LOCATION DETAILED: LEFT INFERIOR UPPER BACK
LOCATION DETAILED: LEFT VENTRAL PROXIMAL FOREARM
LOCATION DETAILED: LEFT RADIAL DORSAL HAND
LOCATION DETAILED: EPIGASTRIC SKIN
LOCATION DETAILED: RIGHT VENTRAL PROXIMAL FOREARM

## 2019-05-10 ASSESSMENT — LOCATION ZONE DERM
LOCATION ZONE: ARM
LOCATION ZONE: TRUNK
LOCATION ZONE: HAND
LOCATION ZONE: FACE

## 2020-06-12 ENCOUNTER — OFFICE VISIT (OUTPATIENT)
Dept: URGENT CARE | Facility: CLINIC | Age: 45
End: 2020-06-12
Payer: COMMERCIAL

## 2020-06-12 VITALS
RESPIRATION RATE: 16 BRPM | SYSTOLIC BLOOD PRESSURE: 144 MMHG | BODY MASS INDEX: 31.83 KG/M2 | WEIGHT: 235 LBS | HEART RATE: 109 BPM | HEIGHT: 72 IN | DIASTOLIC BLOOD PRESSURE: 88 MMHG | OXYGEN SATURATION: 100 % | TEMPERATURE: 98.7 F

## 2020-06-12 DIAGNOSIS — F51.5 NIGHTMARES: ICD-10-CM

## 2020-06-12 DIAGNOSIS — S29.019A ACUTE THORACIC MYOFASCIAL STRAIN, INITIAL ENCOUNTER: ICD-10-CM

## 2020-06-12 LAB
APPEARANCE UR: CLEAR
BILIRUB UR STRIP-MCNC: NORMAL MG/DL
COLOR UR AUTO: YELLOW
GLUCOSE UR STRIP.AUTO-MCNC: NORMAL MG/DL
KETONES UR STRIP.AUTO-MCNC: NORMAL MG/DL
LEUKOCYTE ESTERASE UR QL STRIP.AUTO: NORMAL
NITRITE UR QL STRIP.AUTO: NORMAL
PH UR STRIP.AUTO: 5.5 [PH] (ref 5–8)
PROT UR QL STRIP: NORMAL MG/DL
RBC UR QL AUTO: NORMAL
SP GR UR STRIP.AUTO: >=1.03
UROBILINOGEN UR STRIP-MCNC: 0.2 MG/DL

## 2020-06-12 PROCEDURE — 81002 URINALYSIS NONAUTO W/O SCOPE: CPT | Performed by: PHYSICIAN ASSISTANT

## 2020-06-12 PROCEDURE — 99214 OFFICE O/P EST MOD 30 MIN: CPT | Performed by: PHYSICIAN ASSISTANT

## 2020-06-12 RX ORDER — CYCLOBENZAPRINE HCL 10 MG
10 TABLET ORAL 3 TIMES DAILY PRN
Qty: 30 TAB | Refills: 0 | Status: SHIPPED | OUTPATIENT
Start: 2020-06-12 | End: 2020-10-06

## 2020-06-12 ASSESSMENT — ENCOUNTER SYMPTOMS
NAUSEA: 0
BLOOD IN STOOL: 0
DIZZINESS: 0
VOMITING: 0
HEADACHES: 0
TINGLING: 0
DIARRHEA: 0
ABDOMINAL PAIN: 0
FLANK PAIN: 0
CHILLS: 0
SENSORY CHANGE: 0
CONSTIPATION: 0
SHORTNESS OF BREATH: 0
PALPITATIONS: 0
BACK PAIN: 1
FEVER: 0
WEIGHT LOSS: 0
BLURRED VISION: 0

## 2020-06-12 ASSESSMENT — PAIN SCALES - GENERAL: PAINLEVEL: 7=MODERATE-SEVERE PAIN

## 2020-07-01 ENCOUNTER — APPOINTMENT (OUTPATIENT)
Dept: BEHAVIORAL HEALTH | Facility: CLINIC | Age: 45
End: 2020-07-01
Payer: COMMERCIAL

## 2020-07-09 ENCOUNTER — OFFICE VISIT (OUTPATIENT)
Dept: BEHAVIORAL HEALTH | Facility: CLINIC | Age: 45
End: 2020-07-09
Payer: COMMERCIAL

## 2020-07-09 DIAGNOSIS — G47.09 OTHER INSOMNIA: Primary | ICD-10-CM

## 2020-07-09 DIAGNOSIS — F15.11 METHAMPHETAMINE ABUSE IN REMISSION (HCC): ICD-10-CM

## 2020-07-09 PROCEDURE — 99204 OFFICE O/P NEW MOD 45 MIN: CPT | Performed by: PSYCHIATRY & NEUROLOGY

## 2020-07-09 RX ORDER — TRAZODONE HYDROCHLORIDE 50 MG/1
50 TABLET ORAL NIGHTLY PRN
Qty: 60 TAB | Refills: 0 | Status: SHIPPED | OUTPATIENT
Start: 2020-07-09 | End: 2020-08-03

## 2020-07-09 ASSESSMENT — PATIENT HEALTH QUESTIONNAIRE - PHQ9: CLINICAL INTERPRETATION OF PHQ2 SCORE: 0

## 2020-07-09 NOTE — PROGRESS NOTES
"INITIAL PSYCHIATRIC EVALUATION      This provider informed the patient their medical records are totally confidential except for the use by other providers involved in their care, or if the patient signs a release, or to report instances of child or elder abuse, or if it is determined they are an immediate risk to harm themselves or others.      CHIEF COMPLAINT  \"I don't know why my doc referred me here\" \"I don't think I need this help\"    HISTORY OF PRESENT ILLNESS  Lauro Daley is a 44 y.o. old male comes in today to establish care and for evaluation of insomnia.  I did reviewed all outpatient psychiatry follow up notes over last 3 years. Patient is new to this clinic.    Patient began the session by saying that he do not know why his physician referred him to psychiatry.  Patient is denying any symptoms consistent with depression, wilberto, hypomania, psychosis, suicidal or homicidal ideations.  Patient denies any changes in energy, interest, motivation and denies any behaviors of impulsivity or consistent with aggression or irritability.    Patient does report abusing methamphetamine but has stopped using 2 months ago.  Patient believes his primary care physician referred him to psychiatry to help him stop but patient is no longer using and do not need help with methamphetamine abuse.  He does report struggling with insomnia for many years in terms of difficulty falling asleep and frequent awakening.  We discussed various medication options but he reports poor response to melatonin and over-the-counter sleep aids.  Patient did agreed with plan of following sleep hygiene instruction on a daily basis and considering trazodone as PRN for insomnia management.  Patient was also educated that medication efficacy is not great with underlying obstructive sleep apnea.  Patient does report using CPAP on a daily basis    Patient agreed with plan of continuing follow-up with his primary care physician and they can " prescribe trazodone as PRN for him if he responds well to this medication.    PSYCHIATRIC REVIEW OF SYSTEMS: denies depressive symptoms, denies symptoms of anxiety that interfere with functioning or are overwhelming, denies manic symptoms, denies psychotic symptoms including AH / VH, denies OCD symptoms, denies restrictive eating or purging and denies trauma related symptoms      MEDICAL REVIEW OF SYSTEMS:   Constitutional negative   Eyes negative   Ears/Nose/Mouth/Throat negative   Cardiovascular negative   Respiratory negative   Gastrointestinal negative   Genitourinary negative   Muscular negative   Integumentary negative   Neurological negative   Endocrine negative   Hematologic/Lymphatic negative     CURRENT MEDICATIONS:  Current Outpatient Medications   Medication Sig Dispense Refill   • traZODone (DESYREL) 50 MG Tab Take 1 Tab by mouth at bedtime as needed for Sleep. 60 Tab 0   • cyclobenzaprine (FLEXERIL) 10 MG Tab Take 1 Tab by mouth 3 times a day as needed. 30 Tab 0   • predniSONE (DELTASONE) 20 MG Tab Take 3 tabs at once PO daily x 5 days, then take 2 tabs at once daily x 3 days, then take 1 tab PO daily x 2 days 23 Tab 0     No current facility-administered medications for this visit.          ALLERGIES:  Sulfa drugs      PAST PSYCHIATRIC HISTORY  Prior psychiatric hospitalization: none  Prior Self harm/suicide attempt: no  Prior Diagnosis: none    PAST PSYCHIATRIC MEDICATIONS  ambein  melatinin  OTC sleep aids     FAMILY HISTORY  Psychiatric diagnosis:  no  History of suicide attempts:  no  Substance abuse history:  no    SUBSTANCE USE HISTORY:  ALCOHOL: no  TOBACCO: no  CANNABIS: no  OPIOIDS: no  PRESCRIPTION MEDICATIONS: no  OTHERS: Methamphetamine: edibles daily in past: not used for 2 months.  History of inpatient/outpatient rehab treatment: no    SOCIAL HISTORY    2 kids  Construction work  Denies legal history  No history of abuse    MEDICAL HISTORY  Past Medical History:   Diagnosis Date    • Asthma     as a child     Past Surgical History:   Procedure Laterality Date   • OTHER      sleep apnea surgery         PHYSICAL EXAMINAION:  Vital signs: There were no vitals taken for this visit.  Musculoskeletal: Normal gait.   Abnormal movements: none    MENTAL STATUS EXAMINATION      General:   - Grooming and hygiene: Casual,   - Apparent distress: none,   - Behavior: Calm  - Eye Contact:  Good,   - no psychomotor agitation or retardation    - Participation: Active verbal participation  Orientation: Alert and Fully Oriented to person, place and time  Mood: Euthymic  Affect: Flexible and Full range,  Thought Process: Logical and Goal-directed  Thought Content: Denies suicidal or homicidal ideations, intent or plan Within normal limits  Perception: Denies auditory or visual hallucinations. No delusions noted Within normal limits  Attention span and concentration: Intact   Speech:Rate within normal limits and Volume within normal limits  Language: Appropriate   Insight: Good  Judgment: Good  Recent and remote memory: No gross evidence of memory deficits      DEPRESSION SCREENING:  Depression Screen (PHQ-2/PHQ-9) 6/24/2018 7/9/2020   PHQ-2 Total Score 0 0       Interpretation of PHQ-9 Total Score   Score Severity   1-4 No Depression   5-9 Mild Depression   10-14 Moderate Depression   15-19 Moderately Severe Depression   20-27 Severe Depression      SAFETY ASSESSMENT - SELF:    Does patient acknowledge current or past symptoms of dangerousness to self? no  History of suicide by family member: no  History of suicide by friend/significant other: no  Recent change in amount/specificity/intensity of suicidal thoughts or self-harm behavior? no  Current access to firearms, medications, or other identified means of suicide/self-harm? no  Protective factors present: wife, kids, job, friends       SAFETY ASSESSMENT - OTHERS:    Does patient acknowledge current or past symptoms of aggressive behavior or risk to others?  no  Recent change in amount/specificity/intensity of thoughts or threats to harm others? no  Current access to firearms/other identified means of harm? no       CURRENT RISK:       Suicidal: Low       Homicidal: Low       Self-Harm: Low       Relapse: Low       Crisis Safety Plan Reviewed Not Indicated    MEDICAL RECORDS/LABS/DIAGNOSTIC TESTS REVIEWED:  reviewed      [place]  records -   reviewed      ASSESSMENT  Patient is a 44-year-old male with history of methamphetamine abuse in remission for the last 2 months is referred for management of methamphetamine abuse and insomnia.  Patient is not interested in treatment for methamphetamine abuse at this time and reports having chronic insomnia with medical history of obstructive sleep apnea.  Patient agreed with trial of trazodone as PRN for insomnia management with sleep hygiene instruction follow-up on a daily basis.      DIFFERENTIAL DIAGNOSES  · Unspecified insomnia r/o insomnia secondary to GUERO  · Methamphetamine abuse in remission      PLAN:  (1) Unspecified insomnia r/o insomnia secondary to GUERO  • Add trazodone 50 mg HS PRN for insomnia. #60 tabs with no refill.  • Sleep hygiene instruction discussed and motivated to follow that on a daily basis.  • Medication options, alternatives (including no medications) and medication risks/benefits/side effects were discussed in detail.  • The patient was advised to call, message provider on [employer], or come in to the clinic if symptoms worsen or if any future questions/issues regarding their medications arise; the patient verbalized understanding and agreement.    • The patient was educated to call 911, call the suicide hotline, or go to local ER if having thoughts of suicide or homicide; verbalized understanding.    (2) methamphetamine abuse in remission  · Motivated to remain sober and in remission.    No appointment given but patient is given information to call the clinic if have any questions in future.    The  proposed treatment plan was discussed with the patient who was provided the opportunity to ask questions and make suggestions regarding alternative treatment. Patient verbalized understanding and expressed agreement with the plan.     Thank you for allowing me to participate in the care of this patient.    Jeremy Hernandez M.D.  07/09/20    CC:   IVON Ospina    This note was created using voice recognition software (Dragon). The accuracy of the dictation is limited by the abilities of the software. I have reviewed the note prior to signing, however some errors in grammar and context are still possible. If you have any questions related to this note please do not hesitate to contact our office.

## 2020-07-31 DIAGNOSIS — G47.09 OTHER INSOMNIA: ICD-10-CM

## 2020-08-03 RX ORDER — TRAZODONE HYDROCHLORIDE 50 MG/1
TABLET ORAL
Qty: 30 TAB | Refills: 1 | Status: SHIPPED | OUTPATIENT
Start: 2020-08-03 | End: 2020-10-06

## 2020-08-20 ENCOUNTER — APPOINTMENT (RX ONLY)
Dept: URBAN - METROPOLITAN AREA CLINIC 4 | Facility: CLINIC | Age: 45
Setting detail: DERMATOLOGY
End: 2020-08-20

## 2020-08-20 DIAGNOSIS — D17 BENIGN LIPOMATOUS NEOPLASM: ICD-10-CM

## 2020-08-20 DIAGNOSIS — D22 MELANOCYTIC NEVI: ICD-10-CM

## 2020-08-20 DIAGNOSIS — L72.8 OTHER FOLLICULAR CYSTS OF THE SKIN AND SUBCUTANEOUS TISSUE: ICD-10-CM

## 2020-08-20 DIAGNOSIS — L81.4 OTHER MELANIN HYPERPIGMENTATION: ICD-10-CM

## 2020-08-20 DIAGNOSIS — L57.0 ACTINIC KERATOSIS: ICD-10-CM

## 2020-08-20 DIAGNOSIS — L82.1 OTHER SEBORRHEIC KERATOSIS: ICD-10-CM

## 2020-08-20 DIAGNOSIS — D18.0 HEMANGIOMA: ICD-10-CM

## 2020-08-20 PROBLEM — D17.21 BENIGN LIPOMATOUS NEOPLASM OF SKIN AND SUBCUTANEOUS TISSUE OF RIGHT ARM: Status: ACTIVE | Noted: 2020-08-20

## 2020-08-20 PROBLEM — D22.5 MELANOCYTIC NEVI OF TRUNK: Status: ACTIVE | Noted: 2020-08-20

## 2020-08-20 PROBLEM — D17.1 BENIGN LIPOMATOUS NEOPLASM OF SKIN AND SUBCUTANEOUS TISSUE OF TRUNK: Status: ACTIVE | Noted: 2020-08-20

## 2020-08-20 PROBLEM — D22.71 MELANOCYTIC NEVI OF RIGHT LOWER LIMB, INCLUDING HIP: Status: ACTIVE | Noted: 2020-08-20

## 2020-08-20 PROBLEM — D22.72 MELANOCYTIC NEVI OF LEFT LOWER LIMB, INCLUDING HIP: Status: ACTIVE | Noted: 2020-08-20

## 2020-08-20 PROBLEM — D22.61 MELANOCYTIC NEVI OF RIGHT UPPER LIMB, INCLUDING SHOULDER: Status: ACTIVE | Noted: 2020-08-20

## 2020-08-20 PROBLEM — D22.62 MELANOCYTIC NEVI OF LEFT UPPER LIMB, INCLUDING SHOULDER: Status: ACTIVE | Noted: 2020-08-20

## 2020-08-20 PROBLEM — D17.22 BENIGN LIPOMATOUS NEOPLASM OF SKIN AND SUBCUTANEOUS TISSUE OF LEFT ARM: Status: ACTIVE | Noted: 2020-08-20

## 2020-08-20 PROBLEM — D18.01 HEMANGIOMA OF SKIN AND SUBCUTANEOUS TISSUE: Status: ACTIVE | Noted: 2020-08-20

## 2020-08-20 PROBLEM — D48.5 NEOPLASM OF UNCERTAIN BEHAVIOR OF SKIN: Status: ACTIVE | Noted: 2020-08-20

## 2020-08-20 PROCEDURE — 17003 DESTRUCT PREMALG LES 2-14: CPT

## 2020-08-20 PROCEDURE — ? BIOPSY BY SHAVE METHOD

## 2020-08-20 PROCEDURE — 17000 DESTRUCT PREMALG LESION: CPT | Mod: 59

## 2020-08-20 PROCEDURE — ? PATIENT SPECIFIC COUNSELING

## 2020-08-20 PROCEDURE — 99213 OFFICE O/P EST LOW 20 MIN: CPT | Mod: 25

## 2020-08-20 PROCEDURE — ? CONSULTATION EXCISION

## 2020-08-20 PROCEDURE — ? LIQUID NITROGEN

## 2020-08-20 PROCEDURE — 11102 TANGNTL BX SKIN SINGLE LES: CPT

## 2020-08-20 PROCEDURE — ? COUNSELING

## 2020-08-20 ASSESSMENT — LOCATION DETAILED DESCRIPTION DERM
LOCATION DETAILED: RIGHT RADIAL DORSAL HAND
LOCATION DETAILED: RIGHT ANTIHELIX
LOCATION DETAILED: RIGHT PROXIMAL CALF
LOCATION DETAILED: LEFT SCAPHA
LOCATION DETAILED: RIGHT LATERAL INFERIOR CHEST
LOCATION DETAILED: LEFT POPLITEAL SKIN
LOCATION DETAILED: RIGHT ANTERIOR PROXIMAL UPPER ARM
LOCATION DETAILED: RIGHT VENTRAL DISTAL FOREARM
LOCATION DETAILED: LEFT SUPERIOR MEDIAL MIDBACK
LOCATION DETAILED: RIGHT INFERIOR MEDIAL MIDBACK
LOCATION DETAILED: LEFT LATERAL ABDOMEN
LOCATION DETAILED: LEFT RADIAL DORSAL HAND
LOCATION DETAILED: RIGHT SUPERIOR UPPER BACK
LOCATION DETAILED: RIGHT SUPERIOR HELIX
LOCATION DETAILED: RIGHT POPLITEAL SKIN
LOCATION DETAILED: LEFT VENTRAL DISTAL FOREARM
LOCATION DETAILED: PERIUMBILICAL SKIN
LOCATION DETAILED: RIGHT LATERAL EYEBROW
LOCATION DETAILED: RIGHT SUPERIOR PARIETAL SCALP
LOCATION DETAILED: LEFT VENTRAL DISTAL FOREARM
LOCATION DETAILED: LEFT ANTERIOR PROXIMAL UPPER ARM
LOCATION DETAILED: EPIGASTRIC SKIN
LOCATION DETAILED: RIGHT INFERIOR UPPER BACK
LOCATION DETAILED: RIGHT MEDIAL INFERIOR CHEST
LOCATION DETAILED: LEFT PROXIMAL CALF
LOCATION DETAILED: RIGHT MEDIAL UPPER BACK
LOCATION DETAILED: RIGHT ANTERIOR DISTAL UPPER ARM
LOCATION DETAILED: LEFT ANTERIOR DISTAL UPPER ARM
LOCATION DETAILED: LEFT SUPERIOR HELIX
LOCATION DETAILED: LEFT ANTECUBITAL SKIN
LOCATION DETAILED: RIGHT LATERAL ABDOMEN
LOCATION DETAILED: LEFT INFERIOR CENTRAL MALAR CHEEK
LOCATION DETAILED: LEFT MEDIAL FOREHEAD
LOCATION DETAILED: XIPHOID

## 2020-08-20 ASSESSMENT — LOCATION ZONE DERM
LOCATION ZONE: LEG
LOCATION ZONE: ARM
LOCATION ZONE: FACE
LOCATION ZONE: TRUNK
LOCATION ZONE: EAR
LOCATION ZONE: SCALP
LOCATION ZONE: ARM
LOCATION ZONE: HAND

## 2020-08-20 ASSESSMENT — LOCATION SIMPLE DESCRIPTION DERM
LOCATION SIMPLE: RIGHT UPPER ARM
LOCATION SIMPLE: SCALP
LOCATION SIMPLE: RIGHT HAND
LOCATION SIMPLE: LEFT EAR
LOCATION SIMPLE: LEFT HAND
LOCATION SIMPLE: LEFT FOREARM
LOCATION SIMPLE: CHEST
LOCATION SIMPLE: ABDOMEN
LOCATION SIMPLE: LEFT CHEEK
LOCATION SIMPLE: RIGHT FOREARM
LOCATION SIMPLE: RIGHT EYEBROW
LOCATION SIMPLE: LEFT LOWER BACK
LOCATION SIMPLE: LEFT CALF
LOCATION SIMPLE: LEFT ELBOW
LOCATION SIMPLE: LEFT POPLITEAL SKIN
LOCATION SIMPLE: LEFT FOREARM
LOCATION SIMPLE: RIGHT UPPER BACK
LOCATION SIMPLE: LEFT UPPER ARM
LOCATION SIMPLE: RIGHT CALF
LOCATION SIMPLE: RIGHT LOWER BACK
LOCATION SIMPLE: RIGHT POPLITEAL SKIN
LOCATION SIMPLE: LEFT FOREHEAD
LOCATION SIMPLE: RIGHT EAR

## 2020-08-20 NOTE — PROCEDURE: BIOPSY BY SHAVE METHOD
Detail Level: Detailed
Depth Of Biopsy: dermis
Was A Bandage Applied: Yes
Size Of Lesion In Cm: 0.4
X Size Of Lesion In Cm: 0
Biopsy Type: H and E
Biopsy Method: 10 blade
Anesthesia Type: 1% lidocaine with epinephrine
Anesthesia Volume In Cc: 3
Hemostasis: Electrocautery
Wound Care: Vaseline
Dressing: bandage
Destruction After The Procedure: No
Type Of Destruction Used: Electrodesiccation
Curettage Text: The wound bed was treated with curettage after the biopsy was performed.
Cryotherapy Text: The wound bed was treated with cryotherapy after the biopsy was performed.
Electrodesiccation Text: The wound bed was treated with electrodesiccation after the biopsy was performed.
Electrodesiccation And Curettage Text: The wound bed was treated with electrodesiccation and curettage after the biopsy was performed.
Silver Nitrate Text: The wound bed was treated with silver nitrate after the biopsy was performed.
Lab: [id number]
Lab Facility: 
Consent: Written consent was obtained and risks were reviewed including but not limited to scarring, infection, bleeding, scabbing, incomplete removal, nerve damage and allergy to anesthesia.
Post-Care Instructions: I reviewed with the patient in detail post-care instructions. Patient is to keep the biopsy site dry overnight, and then apply bacitracin twice daily until healed. Patient may apply hydrogen peroxide soaks to remove any crusting.
Notification Instructions: Patient will be notified of biopsy results. However, patient instructed to call the office if not contacted within 2 weeks.
Billing Type: Third-Party Bill
Information: Selecting Yes will display possible errors in your note based on the variables you have selected. This validation is only offered as a suggestion for you. PLEASE NOTE THAT THE VALIDATION TEXT WILL BE REMOVED WHEN YOU FINALIZE YOUR NOTE. IF YOU WANT TO FAX A PRELIMINARY NOTE YOU WILL NEED TO TOGGLE THIS TO 'NO' IF YOU DO NOT WANT IT IN YOUR FAXED NOTE.

## 2020-09-01 ENCOUNTER — APPOINTMENT (RX ONLY)
Dept: URBAN - METROPOLITAN AREA CLINIC 4 | Facility: CLINIC | Age: 45
Setting detail: DERMATOLOGY
End: 2020-09-01

## 2020-10-06 ENCOUNTER — OFFICE VISIT (OUTPATIENT)
Dept: URGENT CARE | Facility: CLINIC | Age: 45
End: 2020-10-06
Payer: COMMERCIAL

## 2020-10-06 VITALS
TEMPERATURE: 98.3 F | RESPIRATION RATE: 17 BRPM | WEIGHT: 239 LBS | HEIGHT: 72 IN | DIASTOLIC BLOOD PRESSURE: 86 MMHG | SYSTOLIC BLOOD PRESSURE: 140 MMHG | HEART RATE: 102 BPM | BODY MASS INDEX: 32.37 KG/M2 | OXYGEN SATURATION: 96 %

## 2020-10-06 DIAGNOSIS — S61.210A LACERATION OF RIGHT INDEX FINGER WITHOUT FOREIGN BODY WITHOUT DAMAGE TO NAIL, INITIAL ENCOUNTER: ICD-10-CM

## 2020-10-06 PROCEDURE — 12001 RPR S/N/AX/GEN/TRNK 2.5CM/<: CPT | Mod: F6 | Performed by: NURSE PRACTITIONER

## 2020-10-06 ASSESSMENT — ENCOUNTER SYMPTOMS
ABDOMINAL PAIN: 0
HEADACHES: 0
WEIGHT LOSS: 0
SORE THROAT: 0
VOMITING: 0
NAUSEA: 0
COUGH: 0
FEVER: 0
MYALGIAS: 0
WEAKNESS: 0
SHORTNESS OF BREATH: 0
ORTHOPNEA: 0
CHILLS: 0

## 2020-10-07 NOTE — PROGRESS NOTES
Subjective:   Lauro Daley is a 45 y.o. male who presents for Laceration (knife cut on R pointer finger)       Laceration   The incident occurred 1 to 3 hours ago. The laceration is located on the right hand. Size: 1.5cm. The laceration mechanism was a clean knife. The pain is moderate. The pain has been constant since onset. He reports no foreign bodies present. His tetanus status is UTD.     Pt presents for evaluation of a new problem, reports cleaning a kitchen knife getting ready to prepare for dinner when the knife slipped and he cut his right hand.  His last tetanus was approximately 4 years ago, declines updating at today's visit.    Review of Systems   Constitutional: Negative for chills, fever, malaise/fatigue and weight loss.   HENT: Negative for congestion and sore throat.    Respiratory: Negative for cough and shortness of breath.    Cardiovascular: Negative for chest pain, orthopnea and leg swelling.   Gastrointestinal: Negative for abdominal pain, nausea and vomiting.   Genitourinary: Negative for dysuria.   Musculoskeletal: Positive for joint pain. Negative for myalgias.   Skin:        Laceration, right pointer finger   Neurological: Negative for weakness and headaches.   All other systems reviewed and are negative.      MEDS:   Current Outpatient Medications:   •  traZODone (DESYREL) 50 MG Tab, TAKE 1 TABLET BY MOUTH AT BEDTIME AS NEEDED FOR SLEEP (Patient not taking: Reported on 10/6/2020), Disp: 30 Tab, Rfl: 1  •  cyclobenzaprine (FLEXERIL) 10 MG Tab, Take 1 Tab by mouth 3 times a day as needed. (Patient not taking: Reported on 10/6/2020), Disp: 30 Tab, Rfl: 0  •  predniSONE (DELTASONE) 20 MG Tab, Take 3 tabs at once PO daily x 5 days, then take 2 tabs at once daily x 3 days, then take 1 tab PO daily x 2 days (Patient not taking: Reported on 10/6/2020), Disp: 23 Tab, Rfl: 0  ALLERGIES:   Allergies   Allergen Reactions   • Sulfa Drugs Rash     body       Patient's PMH, SocHx, SurgHx, FamHx,  Drug allergies and medications were reviewed.     Objective:   /86 (BP Location: Right arm, Patient Position: Sitting, BP Cuff Size: Large adult)   Pulse (!) 102   Temp 36.8 °C (98.3 °F) (Temporal)   Resp 17   Ht 1.829 m (6')   Wt 108.4 kg (239 lb)   SpO2 96%   BMI 32.41 kg/m²     Physical Exam  Vitals signs and nursing note reviewed.   Constitutional:       General: He is awake.      Appearance: Normal appearance. He is well-developed.   HENT:      Head: Normocephalic and atraumatic.      Right Ear: External ear normal.      Left Ear: External ear normal.      Nose: Nose normal.      Mouth/Throat:      Mouth: Mucous membranes are moist.      Pharynx: Oropharynx is clear.   Eyes:      Extraocular Movements: Extraocular movements intact.      Conjunctiva/sclera: Conjunctivae normal.   Neck:      Musculoskeletal: Normal range of motion.   Cardiovascular:      Rate and Rhythm: Normal rate and regular rhythm.   Pulmonary:      Effort: Pulmonary effort is normal.   Musculoskeletal: Normal range of motion.   Skin:     General: Skin is warm and dry.      Findings: Laceration present.             Comments: 1.5 cm laceration across right DIP, no bone or tendon visualized.  Slight decrease in ROM r/t pain and open wound, distal N/V intact.    Neurological:      Mental Status: He is alert and oriented to person, place, and time.   Psychiatric:         Mood and Affect: Mood normal.         Behavior: Behavior normal.         Thought Content: Thought content normal.         Judgment: Judgment normal.       Procedure: Laceration Repair  -Risks including bleeding, nerve damage, infection, and poor cosmetic outcome discussed. Benefits and alternatives discussed.   -Copius irrigation with saline   -Clean technique with sterile instruments and suture used  -Local anesthesia with 5cc of 2% Xylocaine with lidocaine plain  -Closed with #3 4-0 Nylon interrupted sutures with good wound approximation  -Polysporin and dressing  placed  -Patient tolerated well      Assessment/Plan:   Assessment    1. Laceration of right index finger without foreign body without damage to nail, initial encounter    Laceration pair as detailed above.  Patient declined update tetanus today. Patient discharged home without any immediate complications. Wound care instructions provided. Can use OTC analgesics, elevated and/or ice as needed. Worsening/infection precautions given. The patient was instructed to return for suture removal in 7-10 days. All questions answered and he agrees to the plan of care.

## 2021-02-18 ENCOUNTER — TELEPHONE (OUTPATIENT)
Dept: SCHEDULING | Facility: IMAGING CENTER | Age: 46
End: 2021-02-18

## 2021-03-31 NOTE — PROGRESS NOTES
Subjective:     CC: Establish care    HISTORY OF THE PRESENT ILLNESS: Patient is a 45 y.o. male. This pleasant patient is here today to establish care and discuss the following issues:    The patient reports a longstanding history of insomnia.  He has tried zolpidem 5 mg, trazodone, and Seroquel without much relief.  Reports difficulty falling asleep as well as staying asleep.  He reports an acute worsening of his insomnia due to a stressful marital separation that he is currently going through.    He also reports maxillary sinus pain for 2 weeks.  He has had an associated sore throat, runny nose, cough, congestion, and pain radiating into his teeth.    Allergies: Sulfa drugs    Current Outpatient Medications Ordered in Epic   Medication Sig Dispense Refill   • zolpidem (AMBIEN CR) 12.5 MG CR tablet Take 1 tablet by mouth at bedtime as needed for Sleep for up to 90 days. 30 tablet 2   • azithromycin (ZITHROMAX) 250 MG Tab Take 2 tablets on day one, then 1 tablet daily for four days. 6 tablet 0   • testosterone cypionate (DEPO-TESTOSTERONE) 200 MG/ML Solution injection INJECT 2 ML EVERY 2 WEEKS BY INTRAMUSCULAR ROUTE FOR 30 DAYS       No current [facility]-ordered facility-administered medications on file.       Past Medical History:   Diagnosis Date   • Asthma     as a child   • Asthma exacerbation 4/13/2014   • Hypoxia 4/13/2014   • Methamphetamine abuse in remission (HCC) 7/9/2020   • Obesity (BMI 30-39.9) 6/24/2018       Past Surgical History:   Procedure Laterality Date   • OTHER      sleep apnea surgery       Social History     Tobacco Use   • Smoking status: Never Smoker   • Smokeless tobacco: Current User     Types: Chew   Substance Use Topics   • Alcohol use: No   • Drug use: No       Social History     Social History Narrative   • Not on file       History reviewed. No pertinent family history.    Health Maintenance: Completed    ROS:   Gen: no fevers/chills  Pulm: no sob  CV: no chest pain, no palpitations  GI:  no nausea/vomiting, no diarrhea  Neuro: no headaches, no numbness/tingling      Objective:     Exam: /68   Pulse 100   Temp 36.8 °C (98.3 °F)   Resp 14   Ht 1.829 m (6')   Wt 103 kg (226 lb)   SpO2 98%  Body mass index is 30.65 kg/m².    General: Normal appearing. No distress.  HEENT: Normocephalic. Eyes conjunctiva clear lids without ptosis, pupils equal and reactive to light accommodation, oropharynx is without erythema, edema or exudates.   Pulmonary: Clear to ausculation.  Normal effort. No rales, ronchi, or wheezing.  Cardiovascular: Regular rate and rhythm without murmur.   Abdomen: Soft, nontender, nondistended.   Musculoskeletal: No extremity cyanosis, clubbing, or edema.  Psych: Normal mood and affect. Alert and oriented x3. Judgment and insight is normal.    Labs: Reviewed and discussed with patient.    Assessment & Plan:   45 y.o. male with the following -    Primary insomnia  This is a chronic problem.  Progressive.  Has tried Ambien 5 mg, trazodone, Seroquel without much relief.  - zolpidem (AMBIEN CR) 12.5 MG CR tablet; Take 1 tablet by mouth at bedtime as needed for Sleep for up to 90 days.  Dispense: 30 tablet; Refill: 2    Hypogonadism in male  This is a chronic problem.  Stable.  Presumed to be secondary to GUERO.  He is followed by [facility] [place].  - testosterone cypionate (DEPO-TESTOSTERONE) 200 MG/ML Solution injection; INJECT 2 ML EVERY 2 WEEKS BY INTRAMUSCULAR ROUTE FOR 30 DAYS  - Comp Metabolic Panel; Future  - CBC WITHOUT DIFFERENTIAL; Future  - HEMOGLOBIN A1C; Future  - Lipid Profile; Future  - TSH WITH REFLEX TO FT4; Future  - PROSTATE SPECIFIC AG SCREENING; Future    Acute non-recurrent maxillary sinusitis  This is an acute problem.  Start Z-Castillo.  - azithromycin (ZITHROMAX) 250 MG Tab; Take 2 tablets on day one, then 1 tablet daily for four days.  Dispense: 6 tablet; Refill: 0    GUERO (obstructive sleep apnea)  This is a chronic problem.  Stable.  He states he was diagnosed  approximately 20 years ago.    Screening for cardiovascular condition  - Lipid Profile; Future      Return in about 3 months (around 7/1/2021) for Controlled Substance.    Please note that this dictation was created using voice recognition software. I have made every reasonable attempt to correct obvious errors, but I expect that there are errors of grammar and possibly content that I did not discover before finalizing the note.

## 2021-04-01 ENCOUNTER — OFFICE VISIT (OUTPATIENT)
Dept: MEDICAL GROUP | Facility: PHYSICIAN GROUP | Age: 46
End: 2021-04-01
Payer: COMMERCIAL

## 2021-04-01 VITALS
TEMPERATURE: 98.3 F | SYSTOLIC BLOOD PRESSURE: 128 MMHG | RESPIRATION RATE: 14 BRPM | OXYGEN SATURATION: 98 % | BODY MASS INDEX: 30.61 KG/M2 | HEIGHT: 72 IN | HEART RATE: 100 BPM | WEIGHT: 226 LBS | DIASTOLIC BLOOD PRESSURE: 68 MMHG

## 2021-04-01 DIAGNOSIS — G47.33 OSA (OBSTRUCTIVE SLEEP APNEA): ICD-10-CM

## 2021-04-01 DIAGNOSIS — E29.1 HYPOGONADISM IN MALE: ICD-10-CM

## 2021-04-01 DIAGNOSIS — J01.00 ACUTE NON-RECURRENT MAXILLARY SINUSITIS: ICD-10-CM

## 2021-04-01 DIAGNOSIS — F51.01 PRIMARY INSOMNIA: ICD-10-CM

## 2021-04-01 DIAGNOSIS — Z13.6 SCREENING FOR CARDIOVASCULAR CONDITION: ICD-10-CM

## 2021-04-01 PROBLEM — E66.9 OBESITY (BMI 30-39.9): Status: RESOLVED | Noted: 2018-06-24 | Resolved: 2021-04-01

## 2021-04-01 PROBLEM — F15.11 METHAMPHETAMINE ABUSE IN REMISSION (HCC): Status: RESOLVED | Noted: 2020-07-09 | Resolved: 2021-04-01

## 2021-04-01 PROCEDURE — 99204 OFFICE O/P NEW MOD 45 MIN: CPT | Performed by: INTERNAL MEDICINE

## 2021-04-01 RX ORDER — TESTOSTERONE CYPIONATE 200 MG/ML
INJECTION, SOLUTION INTRAMUSCULAR
COMMUNITY
Start: 2021-02-17

## 2021-04-01 RX ORDER — AZITHROMYCIN 250 MG/1
TABLET, FILM COATED ORAL
Qty: 6 TABLET | Refills: 0 | Status: SHIPPED | OUTPATIENT
Start: 2021-04-01 | End: 2021-07-27

## 2021-04-01 RX ORDER — ZOLPIDEM TARTRATE 12.5 MG/1
12.5 TABLET, FILM COATED, EXTENDED RELEASE ORAL NIGHTLY PRN
Qty: 30 TABLET | Refills: 2 | Status: SHIPPED | OUTPATIENT
Start: 2021-04-01 | End: 2021-06-30

## 2021-04-01 ASSESSMENT — PATIENT HEALTH QUESTIONNAIRE - PHQ9: CLINICAL INTERPRETATION OF PHQ2 SCORE: 0

## 2021-07-27 ENCOUNTER — OFFICE VISIT (OUTPATIENT)
Dept: MEDICAL GROUP | Facility: PHYSICIAN GROUP | Age: 46
End: 2021-07-27
Payer: COMMERCIAL

## 2021-07-27 VITALS
WEIGHT: 231 LBS | RESPIRATION RATE: 14 BRPM | HEART RATE: 90 BPM | DIASTOLIC BLOOD PRESSURE: 80 MMHG | HEIGHT: 72 IN | TEMPERATURE: 97.9 F | SYSTOLIC BLOOD PRESSURE: 122 MMHG | BODY MASS INDEX: 31.29 KG/M2 | OXYGEN SATURATION: 99 %

## 2021-07-27 DIAGNOSIS — E29.1 HYPOGONADISM IN MALE: ICD-10-CM

## 2021-07-27 DIAGNOSIS — F51.01 PRIMARY INSOMNIA: ICD-10-CM

## 2021-07-27 PROBLEM — J01.00 ACUTE MAXILLARY SINUSITIS: Status: RESOLVED | Noted: 2021-04-01 | Resolved: 2021-07-27

## 2021-07-27 PROCEDURE — 99214 OFFICE O/P EST MOD 30 MIN: CPT | Performed by: INTERNAL MEDICINE

## 2021-07-27 RX ORDER — ZOLPIDEM TARTRATE 5 MG/1
5 TABLET ORAL NIGHTLY PRN
COMMUNITY
End: 2021-07-27

## 2021-07-27 RX ORDER — ZOLPIDEM TARTRATE 12.5 MG/1
12.5 TABLET, FILM COATED, EXTENDED RELEASE ORAL NIGHTLY PRN
Qty: 90 TABLET | Refills: 0 | Status: SHIPPED | OUTPATIENT
Start: 2021-07-27 | End: 2021-08-18

## 2021-07-27 RX ORDER — ZOLPIDEM TARTRATE 12.5 MG/1
TABLET, FILM COATED, EXTENDED RELEASE ORAL
COMMUNITY
Start: 2021-07-05 | End: 2021-08-18

## 2021-07-27 NOTE — PROGRESS NOTES
Subjective:     CC: Primary insomnia, hypogonadism in male    HPI:   Lauro presents today to discuss the following issues:    The patient states the zolpidem 12.5 mg CR works well for him.  He does require it every night.  He is not currently taking his testosterone, as he is unable to give himself the injections.  His work schedule prohibits him from coming in for bimonthly injections.  His insurance does not cover topical testosterone.      Past Medical History:   Diagnosis Date   • Acute maxillary sinusitis 4/1/2021   • Asthma     as a child   • Asthma exacerbation 4/13/2014   • Hypoxia 4/13/2014   • Methamphetamine abuse in remission (HCC) 7/9/2020   • Obesity (BMI 30-39.9) 6/24/2018       Social History     Tobacco Use   • Smoking status: Never Smoker   • Smokeless tobacco: Current User     Types: Chew   Substance Use Topics   • Alcohol use: No   • Drug use: No       Current Outpatient Medications Ordered in Epic   Medication Sig Dispense Refill   • zolpidem (AMBIEN CR) 12.5 MG CR tablet Take 1 tablet by mouth at bedtime as needed for Sleep for up to 90 days. 90 tablet 0   • zolpidem (AMBIEN CR) 12.5 MG CR tablet TAKE 1 TABLET BY MOUTH AT BEDTIME AS NEEDED FOR SLEEP FOR UP TO 90 DAYS.     • testosterone cypionate (DEPO-TESTOSTERONE) 200 MG/ML Solution injection INJECT 2 ML EVERY 2 WEEKS BY INTRAMUSCULAR ROUTE FOR 30 DAYS     • zolpidem (AMBIEN) 5 MG Tab Take 5 mg by mouth at bedtime as needed for Sleep. (Patient not taking: Reported on 7/27/2021)     • azithromycin (ZITHROMAX) 250 MG Tab Take 2 tablets on day one, then 1 tablet daily for four days. (Patient not taking: Reported on 7/27/2021) 6 tablet 0     No current Epic-ordered facility-administered medications on file.       Allergies:  Sulfa drugs    Health Maintenance: Completed    ROS:   Denies any recent fevers or chills. No nausea or vomiting. No chest pains or shortness of breath.      Objective:       Exam:  /80   Pulse 90   Temp 36.6 °C  (97.9 °F)   Resp 14   Ht 1.829 m (6')   Wt 105 kg (231 lb)   SpO2 99%   BMI 31.33 kg/m²  Body mass index is 31.33 kg/m².    Gen: Alert and oriented, No apparent distress.      Assessment & Plan:     45 y.o. male with the following -     Primary insomnia  This is a chronic condition.  Stable.  The patient has tried Ambien 5 mg, trazodone, Seroquel without much relief.  The patient is now on zolpidem 12.5 mg CR nightly as needed.  Review of the patient's  shows that he was last prescribed zolpidem 12.5 mg CR, dispo #30, on 7/5/2021.  Obtained and reviewed patient utilization report from [facility] pharmacy database on 7/27/2021 4:22 AM  prior to writing prescription for controlled substance II, III or IV per [place] bill . Based on assessment of the report, the prescription is medically necessary.  The controlled substance treatment agreement was signed and scanned into the patient's chart on 7/27/2021.  -Continue zolpidem 12.5 mg CR nightly     Hypogonadism in male  This is a chronic condition.  Well-controlled. Presumed to be secondary to GUERO.  He is followed by [facility] [place].  The patient is on Depo testosterone 200 mg every 2 weeks, but is currently not taking the medication for logistical reasons.  -Continue Depo testosterone 200 mg every 2 weeks, currently on hold      Return in about 3 months (around 10/27/2021) for Controlled Substance.    Please note that this dictation was created using voice recognition software. I have made every reasonable attempt to correct obvious errors, but I expect that there are errors of grammar and possibly content that I did not discover before finalizing the note.

## 2021-08-03 DIAGNOSIS — R53.83 FATIGUE, UNSPECIFIED TYPE: ICD-10-CM

## 2021-08-18 DIAGNOSIS — F51.01 PRIMARY INSOMNIA: ICD-10-CM

## 2021-08-18 RX ORDER — ZOLPIDEM TARTRATE 12.5 MG/1
12.5 TABLET, FILM COATED, EXTENDED RELEASE ORAL NIGHTLY PRN
Qty: 90 TABLET | Refills: 0 | Status: SHIPPED | OUTPATIENT
Start: 2021-08-18 | End: 2021-11-16

## 2022-02-05 DIAGNOSIS — F51.01 PRIMARY INSOMNIA: ICD-10-CM

## 2022-02-05 RX ORDER — ZOLPIDEM TARTRATE 12.5 MG/1
12.5 TABLET, FILM COATED, EXTENDED RELEASE ORAL NIGHTLY PRN
Qty: 30 TABLET | Refills: 2 | Status: SHIPPED | OUTPATIENT
Start: 2022-02-05 | End: 2022-03-07

## 2022-04-22 ENCOUNTER — OFFICE VISIT (OUTPATIENT)
Dept: MEDICAL GROUP | Facility: PHYSICIAN GROUP | Age: 47
End: 2022-04-22
Payer: COMMERCIAL

## 2022-04-22 VITALS
TEMPERATURE: 98.3 F | BODY MASS INDEX: 33.18 KG/M2 | RESPIRATION RATE: 20 BRPM | OXYGEN SATURATION: 96 % | SYSTOLIC BLOOD PRESSURE: 130 MMHG | HEIGHT: 72 IN | DIASTOLIC BLOOD PRESSURE: 88 MMHG | WEIGHT: 245 LBS | HEART RATE: 100 BPM

## 2022-04-22 DIAGNOSIS — G56.03 BILATERAL CARPAL TUNNEL SYNDROME: ICD-10-CM

## 2022-04-22 DIAGNOSIS — M79.641 HAND PAIN, RIGHT: ICD-10-CM

## 2022-04-22 DIAGNOSIS — E29.1 HYPOGONADISM IN MALE: ICD-10-CM

## 2022-04-22 DIAGNOSIS — F51.01 PRIMARY INSOMNIA: ICD-10-CM

## 2022-04-22 PROCEDURE — 99214 OFFICE O/P EST MOD 30 MIN: CPT | Performed by: INTERNAL MEDICINE

## 2022-04-22 RX ORDER — ZOLPIDEM TARTRATE 12.5 MG/1
12.5 TABLET, FILM COATED, EXTENDED RELEASE ORAL
COMMUNITY
Start: 2022-03-24 | End: 2022-08-03

## 2022-04-22 RX ORDER — ZOLPIDEM TARTRATE 12.5 MG/1
12.5 TABLET, FILM COATED, EXTENDED RELEASE ORAL NIGHTLY PRN
Qty: 30 TABLET | Refills: 2 | Status: SHIPPED | OUTPATIENT
Start: 2022-04-22 | End: 2022-07-21

## 2022-04-22 ASSESSMENT — PATIENT HEALTH QUESTIONNAIRE - PHQ9: CLINICAL INTERPRETATION OF PHQ2 SCORE: 0

## 2022-04-22 NOTE — PROGRESS NOTES
Subjective:     CC:   Chief Complaint   Patient presents with   • Carpal Tunnel Syndrome   • Acne         HPI:   Lauro presents today to discuss the following issues:    Hand pain, right  Bilateral carpal tunnel syndrome  The patient states he has a history of bilateral carpal tunnel syndrome.  He has already had a full evaluation, including EMG/NCS testing.  He is requesting a referral to orthopedics for potential interventions.  He also reports right hand pain and swelling.  The pain and swelling is located on the dorsal surface over the third, fourth, and fifth metacarpal region.  He denies antecedent trauma.    Primary insomnia  The patient is requesting a refill of his Ambien.    Hypogonadism in male  The patient is followed by [facility].   He is on Depo testosterone 200 mg every 2 weeks.  He recently resumed his testosterone injections.    Past Medical History:   Diagnosis Date   • Acute maxillary sinusitis 4/1/2021   • Asthma     as a child   • Asthma exacerbation 4/13/2014   • Hypoxia 4/13/2014   • Methamphetamine abuse in remission (HCC) 7/9/2020   • Obesity (BMI 30-39.9) 6/24/2018       Social History     Tobacco Use   • Smoking status: Never Smoker   • Smokeless tobacco: Current User     Types: Chew   Vaping Use   • Vaping Use: Never used   Substance Use Topics   • Alcohol use: No   • Drug use: No       Current Outpatient Medications Ordered in Epic   Medication Sig Dispense Refill   • zolpidem (AMBIEN CR) 12.5 MG CR tablet Take 12.5 mg by mouth at bedtime as needed. For up to 30 days     • zolpidem (AMBIEN CR) 12.5 MG CR tablet Take 1 Tablet by mouth at bedtime as needed for Sleep for up to 90 days. 30 Tablet 2   • testosterone cypionate (DEPO-TESTOSTERONE) 200 MG/ML Solution injection INJECT 2 ML EVERY 2 WEEKS BY INTRAMUSCULAR ROUTE FOR 30 DAYS       No current [facility]-ordered facility-administered medications on file.       Allergies:  Sulfa drugs    Health Maintenance: Completed    ROS:   Denies  any recent fevers or chills. No nausea or vomiting. No chest pains or shortness of breath.      Objective:       Exam:  /88 (BP Location: Left arm, Patient Position: Sitting, BP Cuff Size: Large adult)   Pulse 100   Temp 36.8 °C (98.3 °F) (Temporal)   Resp 20   Ht 1.829 m (6')   Wt 111 kg (245 lb)   SpO2 96%   BMI 33.23 kg/m²  Body mass index is 33.23 kg/m².    Gen: Alert and oriented, No apparent distress.  Hand: Significant swelling and tenderness over the dorsal surface of the third fourth and fifth metacarpal region, no erythema        Assessment & Plan:     46 y.o. male with the following -     Hand pain, right  Bilateral carpal tunnel syndrome  Acute and chronic conditions.  The patient states he has a history of bilateral carpal tunnel syndrome.  He has already had a full evaluation, including EMG/NCS testing.  He is requesting a referral to orthopedics for potential interventions.  He also reports right hand pain and swelling.  The pain and swelling is located on the dorsal surface over the third, fourth, and fifth metacarpal region.  He denies antecedent trauma.  - DX-HAND 3+ RIGHT; Future  - Referral to Orthopedics    Primary insomnia  Chronic medical condition.  Stable.  The patient has tried Ambien 5 mg, trazodone, Seroquel without much relief.  The patient is now on zolpidem 12.5 mg CR nightly as needed.  Review of the patient's  shows that he was last prescribed zolpidem 12.5 mg CR, dispo #30, on 3/24/2022. Obtained and reviewed patient utilization report from [facility] pharmacy database on 4/22/2022 12:02 PM  prior to writing prescription for controlled substance II, III or IV per [place] bill . Based on assessment of the report, the prescription is medically necessary.   -The controlled substance treatment agreement was signed and scanned into the patient's chart on 4/4/2022  -Continue zolpidem 12.5 mg CR nightly   - zolpidem (AMBIEN CR) 12.5 MG CR tablet; Take 1 Tablet by  mouth at bedtime as needed for Sleep for up to 90 days.  Dispense: 30 Tablet; Refill: 2    Hypogonadism in male  Chronic medical condition.  Well-controlled.  The patient is followed by [facility].   He is on Depo testosterone 200 mg every 2 weeks,  which he recently resumed.  -Continue Depo testosterone 200 mg every 2 weeks      Return in about 6 months (around 10/22/2022) for Controlled Substance.    Please note that this dictation was created using voice recognition software. I have made every reasonable attempt to correct obvious errors, but I expect that there are errors of grammar and possibly content that I did not discover before finalizing the note.

## 2022-08-03 RX ORDER — EPINEPHRINE 0.3 MG/.3ML
0.3 INJECTION SUBCUTANEOUS ONCE
Qty: 1 EACH | Refills: 0 | Status: SHIPPED | OUTPATIENT
Start: 2022-08-03 | End: 2022-08-03

## 2022-11-10 ENCOUNTER — APPOINTMENT (RX ONLY)
Dept: URBAN - METROPOLITAN AREA CLINIC 4 | Facility: CLINIC | Age: 47
Setting detail: DERMATOLOGY
End: 2022-11-10

## 2022-11-10 DIAGNOSIS — D22 MELANOCYTIC NEVI: ICD-10-CM

## 2022-11-10 DIAGNOSIS — L82.1 OTHER SEBORRHEIC KERATOSIS: ICD-10-CM

## 2022-11-10 DIAGNOSIS — L57.0 ACTINIC KERATOSIS: ICD-10-CM

## 2022-11-10 DIAGNOSIS — D18.0 HEMANGIOMA: ICD-10-CM

## 2022-11-10 DIAGNOSIS — L81.4 OTHER MELANIN HYPERPIGMENTATION: ICD-10-CM

## 2022-11-10 DIAGNOSIS — D17 BENIGN LIPOMATOUS NEOPLASM: ICD-10-CM

## 2022-11-10 PROBLEM — D22.5 MELANOCYTIC NEVI OF TRUNK: Status: ACTIVE | Noted: 2022-11-10

## 2022-11-10 PROBLEM — D17.21 BENIGN LIPOMATOUS NEOPLASM OF SKIN AND SUBCUTANEOUS TISSUE OF RIGHT ARM: Status: ACTIVE | Noted: 2022-11-10

## 2022-11-10 PROBLEM — D22.61 MELANOCYTIC NEVI OF RIGHT UPPER LIMB, INCLUDING SHOULDER: Status: ACTIVE | Noted: 2022-11-10

## 2022-11-10 PROBLEM — D18.01 HEMANGIOMA OF SKIN AND SUBCUTANEOUS TISSUE: Status: ACTIVE | Noted: 2022-11-10

## 2022-11-10 PROBLEM — D22.62 MELANOCYTIC NEVI OF LEFT UPPER LIMB, INCLUDING SHOULDER: Status: ACTIVE | Noted: 2022-11-10

## 2022-11-10 PROBLEM — D17.22 BENIGN LIPOMATOUS NEOPLASM OF SKIN AND SUBCUTANEOUS TISSUE OF LEFT ARM: Status: ACTIVE | Noted: 2022-11-10

## 2022-11-10 PROCEDURE — 17003 DESTRUCT PREMALG LES 2-14: CPT

## 2022-11-10 PROCEDURE — 99213 OFFICE O/P EST LOW 20 MIN: CPT | Mod: 25

## 2022-11-10 PROCEDURE — 17000 DESTRUCT PREMALG LESION: CPT

## 2022-11-10 PROCEDURE — ? CONSULTATION EXCISION

## 2022-11-10 PROCEDURE — ? COUNSELING

## 2022-11-10 PROCEDURE — ? LIQUID NITROGEN

## 2022-11-10 ASSESSMENT — LOCATION SIMPLE DESCRIPTION DERM
LOCATION SIMPLE: RIGHT LOWER BACK
LOCATION SIMPLE: LEFT UPPER BACK
LOCATION SIMPLE: LEFT HAND
LOCATION SIMPLE: RIGHT EAR
LOCATION SIMPLE: LEFT ELBOW
LOCATION SIMPLE: CHEST
LOCATION SIMPLE: LEFT TEMPLE
LOCATION SIMPLE: SCALP
LOCATION SIMPLE: LOWER BACK
LOCATION SIMPLE: ABDOMEN
LOCATION SIMPLE: RIGHT FOREHEAD
LOCATION SIMPLE: RIGHT FOREARM
LOCATION SIMPLE: LEFT EAR
LOCATION SIMPLE: RIGHT CHEEK
LOCATION SIMPLE: GLABELLA
LOCATION SIMPLE: LEFT UPPER ARM
LOCATION SIMPLE: RIGHT HAND
LOCATION SIMPLE: RIGHT UPPER ARM
LOCATION SIMPLE: LEFT FOREARM

## 2022-11-10 ASSESSMENT — LOCATION DETAILED DESCRIPTION DERM
LOCATION DETAILED: LEFT INFERIOR HELIX
LOCATION DETAILED: LEFT MEDIAL INFERIOR CHEST
LOCATION DETAILED: LEFT VENTRAL PROXIMAL FOREARM
LOCATION DETAILED: RIGHT ANTERIOR DISTAL UPPER ARM
LOCATION DETAILED: LEFT VENTRAL DISTAL FOREARM
LOCATION DETAILED: RIGHT SUPERIOR HELIX
LOCATION DETAILED: RIGHT VENTRAL PROXIMAL FOREARM
LOCATION DETAILED: RIGHT INFERIOR FRONTAL SCALP
LOCATION DETAILED: GLABELLA
LOCATION DETAILED: RIGHT INFERIOR FOREHEAD
LOCATION DETAILED: LEFT CENTRAL TEMPLE
LOCATION DETAILED: RIGHT VENTRAL DISTAL FOREARM
LOCATION DETAILED: RIGHT DISTAL RADIAL DORSAL FOREARM
LOCATION DETAILED: LEFT ANTERIOR MEDIAL DISTAL UPPER ARM
LOCATION DETAILED: LEFT ANTECUBITAL SKIN
LOCATION DETAILED: RIGHT CENTRAL MALAR CHEEK
LOCATION DETAILED: SUPERIOR LUMBAR SPINE
LOCATION DETAILED: RIGHT SUPERIOR MEDIAL MIDBACK
LOCATION DETAILED: RIGHT RADIAL DORSAL HAND
LOCATION DETAILED: LEFT INFERIOR UPPER BACK
LOCATION DETAILED: LEFT ANTERIOR PROXIMAL UPPER ARM
LOCATION DETAILED: LEFT RADIAL DORSAL HAND
LOCATION DETAILED: EPIGASTRIC SKIN
LOCATION DETAILED: LEFT ANTERIOR DISTAL UPPER ARM

## 2022-11-10 ASSESSMENT — LOCATION ZONE DERM
LOCATION ZONE: FACE
LOCATION ZONE: HAND
LOCATION ZONE: ARM
LOCATION ZONE: EAR
LOCATION ZONE: SCALP
LOCATION ZONE: TRUNK

## 2022-11-16 PROBLEM — G56.01 RIGHT CARPAL TUNNEL SYNDROME: Status: ACTIVE | Noted: 2022-11-16

## 2022-12-06 PROBLEM — Z47.89 ORTHOPEDIC AFTERCARE: Status: ACTIVE | Noted: 2022-12-06

## 2022-12-09 PROBLEM — G56.02 LEFT CARPAL TUNNEL SYNDROME: Status: ACTIVE | Noted: 2022-12-09

## 2023-01-19 DIAGNOSIS — F51.01 PRIMARY INSOMNIA: ICD-10-CM

## 2023-01-20 RX ORDER — ZOLPIDEM TARTRATE 12.5 MG/1
TABLET, FILM COATED, EXTENDED RELEASE ORAL
Qty: 30 TABLET | Refills: 0 | Status: SHIPPED | OUTPATIENT
Start: 2023-01-20 | End: 2023-02-19

## 2023-02-13 ENCOUNTER — APPOINTMENT (RX ONLY)
Dept: URBAN - METROPOLITAN AREA CLINIC 36 | Facility: CLINIC | Age: 48
Setting detail: DERMATOLOGY
End: 2023-02-13

## 2023-02-13 DIAGNOSIS — D17 BENIGN LIPOMATOUS NEOPLASM: ICD-10-CM

## 2023-02-13 PROBLEM — D48.5 NEOPLASM OF UNCERTAIN BEHAVIOR OF SKIN: Status: ACTIVE | Noted: 2023-02-13

## 2023-02-13 PROBLEM — D17.30 BENIGN LIPOMATOUS NEOPLASM OF SKIN AND SUBCUTANEOUS TISSUE OF UNSPECIFIED SITES: Status: ACTIVE | Noted: 2023-02-13

## 2023-02-13 PROBLEM — D17.22 BENIGN LIPOMATOUS NEOPLASM OF SKIN AND SUBCUTANEOUS TISSUE OF LEFT ARM: Status: ACTIVE | Noted: 2023-02-13

## 2023-02-13 PROCEDURE — 11403 EXC TR-EXT B9+MARG 2.1-3CM: CPT

## 2023-02-13 PROCEDURE — 11402 EXC TR-EXT B9+MARG 1.1-2 CM: CPT

## 2023-02-13 PROCEDURE — 12032 INTMD RPR S/A/T/EXT 2.6-7.5: CPT

## 2023-02-13 PROCEDURE — ? COUNSELING

## 2023-02-13 PROCEDURE — ? ADDITIONAL NOTES

## 2023-02-13 PROCEDURE — ? EXCISION

## 2023-02-13 ASSESSMENT — LOCATION ZONE DERM: LOCATION ZONE: ARM

## 2023-02-13 ASSESSMENT — LOCATION DETAILED DESCRIPTION DERM
LOCATION DETAILED: LEFT ANTERIOR PROXIMAL UPPER ARM
LOCATION DETAILED: LEFT ANTERIOR DISTAL UPPER ARM

## 2023-02-13 ASSESSMENT — LOCATION SIMPLE DESCRIPTION DERM: LOCATION SIMPLE: LEFT UPPER ARM

## 2023-02-13 NOTE — PROCEDURE: ADDITIONAL NOTES
Render Risk Assessment In Note?: yes
Additional Notes: 2 lipomas on left and right abdomen to be removed in future 2 separate exc appointments
Detail Level: Zone

## 2023-02-13 NOTE — PROCEDURE: EXCISION
Medical Necessity Information: It is in your best interest to select a reason for this procedure from the list below. All of these items fulfill various CMS LCD requirements except lesion extends to a margin.
Include Z78.9 (Other Specified Conditions Influencing Health Status) As An Associated Diagnosis?: No
Medical Necessity Clause: This procedure was medically necessary because the lesion that was treated was:
Lab: [id number]
Lab Facility: 
Surgeon Performing Repair (Optional): Jael Mix
Size Of Lesion In Cm: 2
Size Of Margin In Cm: 0.2
Anesthesia Volume In Cc: 3
Eye Clamp Note Details: An eye clamp was used during the procedure.
Excision Method: Slit
Saucerization Depth: dermis and superficial adipose tissue
Repair Type: Intermediate
Suturegard Retention Suture: 2-0 Nylon
Retention Suture Bite Size: 3 mm
Length To Time In Minutes Device Was In Place: 10
Number Of Hemigard Strips Per Side: 1
Undermining Type: Entire Wound
Debridement Text: The wound edges were debrided prior to proceeding with the closure to facilitate wound healing.
Helical Rim Text: The closure involved the helical rim.
Vermilion Border Text: The closure involved the vermilion border.
Nostril Rim Text: The closure involved the nostril rim.
Retention Suture Text: Retention sutures were placed to support the closure and prevent dehiscence.
Primary Defect Length (In Cm): 0
Suture Removal: 14 days
Epidermal Closure Graft Donor Site (Optional): simple interrupted
Graft Donor Site Bandage (Optional-Leave Blank If You Don't Want In Note): Steri-strips and a pressure bandage were applied to the donor site.
Detail Level: Detailed
Excision Depth: adipose tissue
Scalpel Size: 15 blade
Anesthesia Type: 2% lidocaine with epinephrine and a 1:12 solution of 8.4% sodium bicarbonate
Additional Anesthesia Volume In Cc: 6
Hemostasis: Electrocautery
Estimated Blood Loss (Cc): minimal
Anesthesia Type: 2% lidocaine with epinephrine and a 2:1 solution of Sodium Bicarbonate 8.4%
Deep Sutures: 4-0 Maxon
Epidermal Sutures: 4-0 Surgipro
Wound Care: Vaseline
Dressing: pressure dressing with telfa
Wound Check: 2 days
Suturegard Intro: Intraoperative tissue expansion was performed, utilizing the SUTUREGARD device, in order to reduce wound tension.
Suturegard Body: The suture ends were repeatedly re-tightened and re-clamped to achieve the desired tissue expansion.
Hemigard Intro: Due to skin fragility and wound tension, it was decided to use HEMIGARD adhesive retention suture devices to permit a linear closure. The skin was cleaned and dried for a 6cm distance away from the wound. Excessive hair, if present, was removed to allow for adhesion.
Hemigard Postcare Instructions: The HEMIGARD strips are to remain completely dry for at least 5-7 days.
Positioning (Leave Blank If You Do Not Want): The patient was placed in a comfortable position exposing the surgical site.
Complex Repair Preamble Text (Leave Blank If You Do Not Want): Extensive wide undermining was performed.
Intermediate Repair Preamble Text (Leave Blank If You Do Not Want): Undermining was performed with blunt dissection.
Fusiform Excision Additional Text (Leave Blank If You Do Not Want): The margin was drawn around the clinically apparent lesion.  A fusiform shape was then drawn on the skin incorporating the lesion and margins.  Incisions were then made along these lines to the appropriate tissue plane and the lesion was extirpated.
Eliptical Excision Additional Text (Leave Blank If You Do Not Want): The margin was drawn around the clinically apparent lesion.  An elliptical shape was then drawn on the skin incorporating the lesion and margins.  Incisions were then made along these lines to the appropriate tissue plane and the lesion was extirpated.
Saucerization Excision Additional Text (Leave Blank If You Do Not Want): The margin was drawn around the clinically apparent lesion.  Incisions were then made along these lines, in a tangential fashion, to the appropriate tissue plane and the lesion was extirpated.
Slit Excision Additional Text (Leave Blank If You Do Not Want): A linear line was drawn on the skin overlying the lesion. An incision was made slowly until the lesion was visualized.  Once visualized, the lesion was removed with blunt dissection.
Excisional Biopsy Additional Text (Leave Blank If You Do Not Want): The margin was drawn around the clinically apparent lesion. An elliptical shape was then drawn on the skin incorporating the lesion and margins.  Incisions were then made along these lines to the appropriate tissue plane and the lesion was extirpated.
Perilesional Excision Additional Text (Leave Blank If You Do Not Want): The margin was drawn around the clinically apparent lesion. Incisions were then made along these lines to the appropriate tissue plane and the lesion was extirpated.
Repair Performed By Another Provider Text (Leave Blank If You Do Not Want): After the tissue was excised the defect was repaired by another provider.
No Repair - Repaired With Adjacent Surgical Defect Text (Leave Blank If You Do Not Want): After the excision the defect was repaired concurrently with another surgical defect which was in close approximation.
Adjacent Tissue Transfer Text: The defect edges were debeveled with a #15 scalpel blade.  Given the location of the defect and the proximity to free margins an adjacent tissue transfer was deemed most appropriate.  Using a sterile surgical marker, an appropriate flap was drawn incorporating the defect and placing the expected incisions within the relaxed skin tension lines where possible.    The area thus outlined was incised deep to adipose tissue with a #15 scalpel blade.  The skin margins were undermined to an appropriate distance in all directions utilizing iris scissors.
Advancement Flap (Single) Text: The defect edges were debeveled with a #15 scalpel blade.  Given the location of the defect and the proximity to free margins a single advancement flap was deemed most appropriate.  Using a sterile surgical marker, an appropriate advancement flap was drawn incorporating the defect and placing the expected incisions within the relaxed skin tension lines where possible.    The area thus outlined was incised deep to adipose tissue with a #15 scalpel blade.  The skin margins were undermined to an appropriate distance in all directions utilizing iris scissors.
Advancement Flap (Double) Text: The defect edges were debeveled with a #15 scalpel blade.  Given the location of the defect and the proximity to free margins a double advancement flap was deemed most appropriate.  Using a sterile surgical marker, the appropriate advancement flaps were drawn incorporating the defect and placing the expected incisions within the relaxed skin tension lines where possible.    The area thus outlined was incised deep to adipose tissue with a #15 scalpel blade.  The skin margins were undermined to an appropriate distance in all directions utilizing iris scissors.
Burow's Advancement Flap Text: The defect edges were debeveled with a #15 scalpel blade.  Given the location of the defect and the proximity to free margins a Burow's advancement flap was deemed most appropriate.  Using a sterile surgical marker, the appropriate advancement flap was drawn incorporating the defect and placing the expected incisions within the relaxed skin tension lines where possible.    The area thus outlined was incised deep to adipose tissue with a #15 scalpel blade.  The skin margins were undermined to an appropriate distance in all directions utilizing iris scissors.
Chonodrocutaneous Helical Advancement Flap Text: The defect edges were debeveled with a #15 scalpel blade.  Given the location of the defect and the proximity to free margins a chondrocutaneous helical advancement flap was deemed most appropriate.  Using a sterile surgical marker, the appropriate advancement flap was drawn incorporating the defect and placing the expected incisions within the relaxed skin tension lines where possible.    The area thus outlined was incised deep to adipose tissue with a #15 scalpel blade.  The skin margins were undermined to an appropriate distance in all directions utilizing iris scissors.
Crescentic Advancement Flap Text: The defect edges were debeveled with a #15 scalpel blade.  Given the location of the defect and the proximity to free margins a crescentic advancement flap was deemed most appropriate.  Using a sterile surgical marker, the appropriate advancement flap was drawn incorporating the defect and placing the expected incisions within the relaxed skin tension lines where possible.    The area thus outlined was incised deep to adipose tissue with a #15 scalpel blade.  The skin margins were undermined to an appropriate distance in all directions utilizing iris scissors.
A-T Advancement Flap Text: The defect edges were debeveled with a #15 scalpel blade.  Given the location of the defect, shape of the defect and the proximity to free margins an A-T advancement flap was deemed most appropriate.  Using a sterile surgical marker, an appropriate advancement flap was drawn incorporating the defect and placing the expected incisions within the relaxed skin tension lines where possible.    The area thus outlined was incised deep to adipose tissue with a #15 scalpel blade.  The skin margins were undermined to an appropriate distance in all directions utilizing iris scissors.
O-T Advancement Flap Text: The defect edges were debeveled with a #15 scalpel blade.  Given the location of the defect, shape of the defect and the proximity to free margins an O-T advancement flap was deemed most appropriate.  Using a sterile surgical marker, an appropriate advancement flap was drawn incorporating the defect and placing the expected incisions within the relaxed skin tension lines where possible.    The area thus outlined was incised deep to adipose tissue with a #15 scalpel blade.  The skin margins were undermined to an appropriate distance in all directions utilizing iris scissors.
O-L Flap Text: The defect edges were debeveled with a #15 scalpel blade.  Given the location of the defect, shape of the defect and the proximity to free margins an O-L flap was deemed most appropriate.  Using a sterile surgical marker, an appropriate advancement flap was drawn incorporating the defect and placing the expected incisions within the relaxed skin tension lines where possible.    The area thus outlined was incised deep to adipose tissue with a #15 scalpel blade.  The skin margins were undermined to an appropriate distance in all directions utilizing iris scissors.
O-Z Flap Text: The defect edges were debeveled with a #15 scalpel blade.  Given the location of the defect, shape of the defect and the proximity to free margins an O-Z flap was deemed most appropriate.  Using a sterile surgical marker, an appropriate transposition flap was drawn incorporating the defect and placing the expected incisions within the relaxed skin tension lines where possible. The area thus outlined was incised deep to adipose tissue with a #15 scalpel blade.  The skin margins were undermined to an appropriate distance in all directions utilizing iris scissors.
Double O-Z Flap Text: The defect edges were debeveled with a #15 scalpel blade.  Given the location of the defect, shape of the defect and the proximity to free margins a Double O-Z flap was deemed most appropriate.  Using a sterile surgical marker, an appropriate transposition flap was drawn incorporating the defect and placing the expected incisions within the relaxed skin tension lines where possible. The area thus outlined was incised deep to adipose tissue with a #15 scalpel blade.  The skin margins were undermined to an appropriate distance in all directions utilizing iris scissors.
V-Y Flap Text: The defect edges were debeveled with a #15 scalpel blade.  Given the location of the defect, shape of the defect and the proximity to free margins a V-Y flap was deemed most appropriate.  Using a sterile surgical marker, an appropriate advancement flap was drawn incorporating the defect and placing the expected incisions within the relaxed skin tension lines where possible.    The area thus outlined was incised deep to adipose tissue with a #15 scalpel blade.  The skin margins were undermined to an appropriate distance in all directions utilizing iris scissors.
Advancement-Rotation Flap Text: The defect edges were debeveled with a #15 scalpel blade.  Given the location of the defect, shape of the defect and the proximity to free margins an advancement-rotation flap was deemed most appropriate.  Using a sterile surgical marker, an appropriate flap was drawn incorporating the defect and placing the expected incisions within the relaxed skin tension lines where possible. The area thus outlined was incised deep to adipose tissue with a #15 scalpel blade.  The skin margins were undermined to an appropriate distance in all directions utilizing iris scissors.
Mercedes Flap Text: The defect edges were debeveled with a #15 scalpel blade.  Given the location of the defect, shape of the defect and the proximity to free margins a Mercedes flap was deemed most appropriate.  Using a sterile surgical marker, an appropriate advancement flap was drawn incorporating the defect and placing the expected incisions within the relaxed skin tension lines where possible. The area thus outlined was incised deep to adipose tissue with a #15 scalpel blade.  The skin margins were undermined to an appropriate distance in all directions utilizing iris scissors.
Modified Advancement Flap Text: The defect edges were debeveled with a #15 scalpel blade.  Given the location of the defect, shape of the defect and the proximity to free margins a modified advancement flap was deemed most appropriate.  Using a sterile surgical marker, an appropriate advancement flap was drawn incorporating the defect and placing the expected incisions within the relaxed skin tension lines where possible.    The area thus outlined was incised deep to adipose tissue with a #15 scalpel blade.  The skin margins were undermined to an appropriate distance in all directions utilizing iris scissors.
Mucosal Advancement Flap Text: Given the location of the defect, shape of the defect and the proximity to free margins a mucosal advancement flap was deemed most appropriate. Incisions were made with a 15 blade scalpel in the appropriate fashion along the cutaneous vermillion border and the mucosal lip. The remaining actinically damaged mucosal tissue was excised.  The mucosal advancement flap was then elevated to the gingival sulcus with care taken to preserve the neurovascular structures and advanced into the primary defect. Care was taken to ensure that precise realignment of the vermilion border was achieved.
Peng Advancement Flap Text: The defect edges were debeveled with a #15 scalpel blade.  Given the location of the defect, shape of the defect and the proximity to free margins a Peng advancement flap was deemed most appropriate.  Using a sterile surgical marker, an appropriate advancement flap was drawn incorporating the defect and placing the expected incisions within the relaxed skin tension lines where possible. The area thus outlined was incised deep to adipose tissue with a #15 scalpel blade.  The skin margins were undermined to an appropriate distance in all directions utilizing iris scissors.
Hatchet Flap Text: The defect edges were debeveled with a #15 scalpel blade.  Given the location of the defect, shape of the defect and the proximity to free margins a hatchet flap was deemed most appropriate.  Using a sterile surgical marker, an appropriate hatchet flap was drawn incorporating the defect and placing the expected incisions within the relaxed skin tension lines where possible.    The area thus outlined was incised deep to adipose tissue with a #15 scalpel blade.  The skin margins were undermined to an appropriate distance in all directions utilizing iris scissors.
Rotation Flap Text: The defect edges were debeveled with a #15 scalpel blade.  Given the location of the defect, shape of the defect and the proximity to free margins a rotation flap was deemed most appropriate.  Using a sterile surgical marker, an appropriate rotation flap was drawn incorporating the defect and placing the expected incisions within the relaxed skin tension lines where possible.    The area thus outlined was incised deep to adipose tissue with a #15 scalpel blade.  The skin margins were undermined to an appropriate distance in all directions utilizing iris scissors.
Spiral Flap Text: The defect edges were debeveled with a #15 scalpel blade.  Given the location of the defect, shape of the defect and the proximity to free margins a spiral flap was deemed most appropriate.  Using a sterile surgical marker, an appropriate rotation flap was drawn incorporating the defect and placing the expected incisions within the relaxed skin tension lines where possible. The area thus outlined was incised deep to adipose tissue with a #15 scalpel blade.  The skin margins were undermined to an appropriate distance in all directions utilizing iris scissors.
Staged Advancement Flap Text: The defect edges were debeveled with a #15 scalpel blade.  Given the location of the defect, shape of the defect and the proximity to free margins a staged advancement flap was deemed most appropriate.  Using a sterile surgical marker, an appropriate advancement flap was drawn incorporating the defect and placing the expected incisions within the relaxed skin tension lines where possible. The area thus outlined was incised deep to adipose tissue with a #15 scalpel blade.  The skin margins were undermined to an appropriate distance in all directions utilizing iris scissors.
Star Wedge Flap Text: The defect edges were debeveled with a #15 scalpel blade.  Given the location of the defect, shape of the defect and the proximity to free margins a star wedge flap was deemed most appropriate.  Using a sterile surgical marker, an appropriate rotation flap was drawn incorporating the defect and placing the expected incisions within the relaxed skin tension lines where possible. The area thus outlined was incised deep to adipose tissue with a #15 scalpel blade.  The skin margins were undermined to an appropriate distance in all directions utilizing iris scissors.
Transposition Flap Text: The defect edges were debeveled with a #15 scalpel blade.  Given the location of the defect and the proximity to free margins a transposition flap was deemed most appropriate.  Using a sterile surgical marker, an appropriate transposition flap was drawn incorporating the defect.    The area thus outlined was incised deep to adipose tissue with a #15 scalpel blade.  The skin margins were undermined to an appropriate distance in all directions utilizing iris scissors.
Muscle Hinge Flap Text: The defect edges were debeveled with a #15 scalpel blade.  Given the size, depth and location of the defect and the proximity to free margins a muscle hinge flap was deemed most appropriate.  Using a sterile surgical marker, an appropriate hinge flap was drawn incorporating the defect. The area thus outlined was incised with a #15 scalpel blade.  The skin margins were undermined to an appropriate distance in all directions utilizing iris scissors.
Mustarde Flap Text: The defect edges were debeveled with a #15 scalpel blade.  Given the size, depth and location of the defect and the proximity to free margins a Mustarde flap was deemed most appropriate.  Using a sterile surgical marker, an appropriate flap was drawn incorporating the defect. The area thus outlined was incised with a #15 scalpel blade.  The skin margins were undermined to an appropriate distance in all directions utilizing iris scissors.
Nasal Turnover Hinge Flap Text: The defect edges were debeveled with a #15 scalpel blade.  Given the size, depth, location of the defect and the defect being full thickness a nasal turnover hinge flap was deemed most appropriate.  Using a sterile surgical marker, an appropriate hinge flap was drawn incorporating the defect. The area thus outlined was incised with a #15 scalpel blade. The flap was designed to recreate the nasal mucosal lining and the alar rim. The skin margins were undermined to an appropriate distance in all directions utilizing iris scissors.
Nasalis-Muscle-Based Myocutaneous Island Pedicle Flap Text: Using a #15 blade, an incision was made around the donor flap to the level of the nasalis muscle. Wide lateral undermining was then performed in both the subcutaneous plane above the nasalis muscle, and in a submuscular plane just above periosteum. This allowed the formation of a free nasalis muscle axial pedicle (based on the angular artery) which was still attached to the actual cutaneous flap, increasing its mobility and vascular viability. Hemostasis was obtained with pinpoint electrocoagulation. The flap was mobilized into position and the pivotal anchor points positioned and stabilized with buried interrupted sutures. Subcutaneous and dermal tissues were closed in a multilayered fashion with sutures. Tissue redundancies were excised, and the epidermal edges were apposed without significant tension and sutured with sutures.
Orbicularis Oris Muscle Flap Text: The defect edges were debeveled with a #15 scalpel blade.  Given that the defect affected the competency of the oral sphincter an obicularis oris muscle flap was deemed most appropriate to restore this competency and normal muscle function.  Using a sterile surgical marker, an appropriate flap was drawn incorporating the defect. The area thus outlined was incised with a #15 scalpel blade.
Melolabial Transposition Flap Text: The defect edges were debeveled with a #15 scalpel blade.  Given the location of the defect and the proximity to free margins a melolabial flap was deemed most appropriate.  Using a sterile surgical marker, an appropriate melolabial transposition flap was drawn incorporating the defect.    The area thus outlined was incised deep to adipose tissue with a #15 scalpel blade.  The skin margins were undermined to an appropriate distance in all directions utilizing iris scissors.
Rhombic Flap Text: The defect edges were debeveled with a #15 scalpel blade.  Given the location of the defect and the proximity to free margins a rhombic flap was deemed most appropriate.  Using a sterile surgical marker, an appropriate rhombic flap was drawn incorporating the defect.    The area thus outlined was incised deep to adipose tissue with a #15 scalpel blade.  The skin margins were undermined to an appropriate distance in all directions utilizing iris scissors.
Rhomboid Transposition Flap Text: The defect edges were debeveled with a #15 scalpel blade.  Given the location of the defect and the proximity to free margins a rhomboid transposition flap was deemed most appropriate.  Using a sterile surgical marker, an appropriate rhomboid flap was drawn incorporating the defect.    The area thus outlined was incised deep to adipose tissue with a #15 scalpel blade.  The skin margins were undermined to an appropriate distance in all directions utilizing iris scissors.
Bi-Rhombic Flap Text: The defect edges were debeveled with a #15 scalpel blade.  Given the location of the defect and the proximity to free margins a bi-rhombic flap was deemed most appropriate.  Using a sterile surgical marker, an appropriate rhombic flap was drawn incorporating the defect. The area thus outlined was incised deep to adipose tissue with a #15 scalpel blade.  The skin margins were undermined to an appropriate distance in all directions utilizing iris scissors.
Helical Rim Advancement Flap Text: The defect edges were debeveled with a #15 blade scalpel.  Given the location of the defect and the proximity to free margins (helical rim) a double helical rim advancement flap was deemed most appropriate.  Using a sterile surgical marker, the appropriate advancement flaps were drawn incorporating the defect and placing the expected incisions between the helical rim and antihelix where possible.  The area thus outlined was incised through and through with a #15 scalpel blade.  With a skin hook and iris scissors, the flaps were gently and sharply undermined and freed up.
Bilateral Helical Rim Advancement Flap Text: The defect edges were debeveled with a #15 blade scalpel.  Given the location of the defect and the proximity to free margins (helical rim) a bilateral helical rim advancement flap was deemed most appropriate.  Using a sterile surgical marker, the appropriate advancement flaps were drawn incorporating the defect and placing the expected incisions between the helical rim and antihelix where possible.  The area thus outlined was incised through and through with a #15 scalpel blade.  With a skin hook and iris scissors, the flaps were gently and sharply undermined and freed up.
Ear Star Wedge Flap Text: The defect edges were debeveled with a #15 blade scalpel.  Given the location of the defect and the proximity to free margins (helical rim) an ear star wedge flap was deemed most appropriate.  Using a sterile surgical marker, the appropriate flap was drawn incorporating the defect and placing the expected incisions between the helical rim and antihelix where possible.  The area thus outlined was incised through and through with a #15 scalpel blade.
Banner Transposition Flap Text: The defect edges were debeveled with a #15 scalpel blade.  Given the location of the defect and the proximity to free margins a Banner transposition flap was deemed most appropriate.  Using a sterile surgical marker, an appropriate flap drawn around the defect. The area thus outlined was incised deep to adipose tissue with a #15 scalpel blade.  The skin margins were undermined to an appropriate distance in all directions utilizing iris scissors.
Bilobed Flap Text: The defect edges were debeveled with a #15 scalpel blade.  Given the location of the defect and the proximity to free margins a bilobe flap was deemed most appropriate.  Using a sterile surgical marker, an appropriate bilobe flap drawn around the defect.    The area thus outlined was incised deep to adipose tissue with a #15 scalpel blade.  The skin margins were undermined to an appropriate distance in all directions utilizing iris scissors.
Bilobed Transposition Flap Text: The defect edges were debeveled with a #15 scalpel blade.  Given the location of the defect and the proximity to free margins a bilobed transposition flap was deemed most appropriate.  Using a sterile surgical marker, an appropriate bilobe flap drawn around the defect.    The area thus outlined was incised deep to adipose tissue with a #15 scalpel blade.  The skin margins were undermined to an appropriate distance in all directions utilizing iris scissors.
Trilobed Flap Text: The defect edges were debeveled with a #15 scalpel blade.  Given the location of the defect and the proximity to free margins a trilobed flap was deemed most appropriate.  Using a sterile surgical marker, an appropriate trilobed flap drawn around the defect.    The area thus outlined was incised deep to adipose tissue with a #15 scalpel blade.  The skin margins were undermined to an appropriate distance in all directions utilizing iris scissors.
Dorsal Nasal Flap Text: The defect edges were debeveled with a #15 scalpel blade.  Given the location of the defect and the proximity to free margins a dorsal nasal flap was deemed most appropriate.  Using a sterile surgical marker, an appropriate dorsal nasal flap was drawn around the defect.    The area thus outlined was incised deep to adipose tissue with a #15 scalpel blade.  The skin margins were undermined to an appropriate distance in all directions utilizing iris scissors.
Island Pedicle Flap Text: The defect edges were debeveled with a #15 scalpel blade.  Given the location of the defect, shape of the defect and the proximity to free margins an island pedicle advancement flap was deemed most appropriate.  Using a sterile surgical marker, an appropriate advancement flap was drawn incorporating the defect, outlining the appropriate donor tissue and placing the expected incisions within the relaxed skin tension lines where possible.    The area thus outlined was incised deep to adipose tissue with a #15 scalpel blade.  The skin margins were undermined to an appropriate distance in all directions around the primary defect and laterally outward around the island pedicle utilizing iris scissors.  There was minimal undermining beneath the pedicle flap.
Island Pedicle Flap With Canthal Suspension Text: The defect edges were debeveled with a #15 scalpel blade.  Given the location of the defect, shape of the defect and the proximity to free margins an island pedicle advancement flap was deemed most appropriate.  Using a sterile surgical marker, an appropriate advancement flap was drawn incorporating the defect, outlining the appropriate donor tissue and placing the expected incisions within the relaxed skin tension lines where possible. The area thus outlined was incised deep to adipose tissue with a #15 scalpel blade.  The skin margins were undermined to an appropriate distance in all directions around the primary defect and laterally outward around the island pedicle utilizing iris scissors.  There was minimal undermining beneath the pedicle flap. A suspension suture was placed in the canthal tendon to prevent tension and prevent ectropion.
Alar Island Pedicle Flap Text: The defect edges were debeveled with a #15 scalpel blade.  Given the location of the defect, shape of the defect and the proximity to the alar rim an island pedicle advancement flap was deemed most appropriate.  Using a sterile surgical marker, an appropriate advancement flap was drawn incorporating the defect, outlining the appropriate donor tissue and placing the expected incisions within the nasal ala running parallel to the alar rim. The area thus outlined was incised with a #15 scalpel blade.  The skin margins were undermined minimally to an appropriate distance in all directions around the primary defect and laterally outward around the island pedicle utilizing iris scissors.  There was minimal undermining beneath the pedicle flap.
Double Island Pedicle Flap Text: The defect edges were debeveled with a #15 scalpel blade.  Given the location of the defect, shape of the defect and the proximity to free margins a double island pedicle advancement flap was deemed most appropriate.  Using a sterile surgical marker, an appropriate advancement flap was drawn incorporating the defect, outlining the appropriate donor tissue and placing the expected incisions within the relaxed skin tension lines where possible.    The area thus outlined was incised deep to adipose tissue with a #15 scalpel blade.  The skin margins were undermined to an appropriate distance in all directions around the primary defect and laterally outward around the island pedicle utilizing iris scissors.  There was minimal undermining beneath the pedicle flap.
Island Pedicle Flap-Requiring Vessel Identification Text: The defect edges were debeveled with a #15 scalpel blade.  Given the location of the defect, shape of the defect and the proximity to free margins an island pedicle advancement flap was deemed most appropriate.  Using a sterile surgical marker, an appropriate advancement flap was drawn, based on the axial vessel mentioned above, incorporating the defect, outlining the appropriate donor tissue and placing the expected incisions within the relaxed skin tension lines where possible.    The area thus outlined was incised deep to adipose tissue with a #15 scalpel blade.  The skin margins were undermined to an appropriate distance in all directions around the primary defect and laterally outward around the island pedicle utilizing iris scissors.  There was minimal undermining beneath the pedicle flap.
Keystone Flap Text: The defect edges were debeveled with a #15 scalpel blade.  Given the location of the defect, shape of the defect a keystone flap was deemed most appropriate.  Using a sterile surgical marker, an appropriate keystone flap was drawn incorporating the defect, outlining the appropriate donor tissue and placing the expected incisions within the relaxed skin tension lines where possible. The area thus outlined was incised deep to adipose tissue with a #15 scalpel blade.  The skin margins were undermined to an appropriate distance in all directions around the primary defect and laterally outward around the flap utilizing iris scissors.
O-T Plasty Text: The defect edges were debeveled with a #15 scalpel blade.  Given the location of the defect, shape of the defect and the proximity to free margins an O-T plasty was deemed most appropriate.  Using a sterile surgical marker, an appropriate O-T plasty was drawn incorporating the defect and placing the expected incisions within the relaxed skin tension lines where possible.    The area thus outlined was incised deep to adipose tissue with a #15 scalpel blade.  The skin margins were undermined to an appropriate distance in all directions utilizing iris scissors.
O-Z Plasty Text: The defect edges were debeveled with a #15 scalpel blade.  Given the location of the defect, shape of the defect and the proximity to free margins an O-Z plasty (double transposition flap) was deemed most appropriate.  Using a sterile surgical marker, the appropriate transposition flaps were drawn incorporating the defect and placing the expected incisions within the relaxed skin tension lines where possible.    The area thus outlined was incised deep to adipose tissue with a #15 scalpel blade.  The skin margins were undermined to an appropriate distance in all directions utilizing iris scissors.  Hemostasis was achieved with electrocautery.  The flaps were then transposed into place, one clockwise and the other counterclockwise, and anchored with interrupted buried subcutaneous sutures.
Double O-Z Plasty Text: The defect edges were debeveled with a #15 scalpel blade.  Given the location of the defect, shape of the defect and the proximity to free margins a Double O-Z plasty (double transposition flap) was deemed most appropriate.  Using a sterile surgical marker, the appropriate transposition flaps were drawn incorporating the defect and placing the expected incisions within the relaxed skin tension lines where possible. The area thus outlined was incised deep to adipose tissue with a #15 scalpel blade.  The skin margins were undermined to an appropriate distance in all directions utilizing iris scissors.  Hemostasis was achieved with electrocautery.  The flaps were then transposed into place, one clockwise and the other counterclockwise, and anchored with interrupted buried subcutaneous sutures.
V-Y Plasty Text: The defect edges were debeveled with a #15 scalpel blade.  Given the location of the defect, shape of the defect and the proximity to free margins an V-Y advancement flap was deemed most appropriate.  Using a sterile surgical marker, an appropriate advancement flap was drawn incorporating the defect and placing the expected incisions within the relaxed skin tension lines where possible.    The area thus outlined was incised deep to adipose tissue with a #15 scalpel blade.  The skin margins were undermined to an appropriate distance in all directions utilizing iris scissors.
H Plasty Text: Given the location of the defect, shape of the defect and the proximity to free margins a H-plasty was deemed most appropriate for repair.  Using a sterile surgical marker, the appropriate advancement arms of the H-plasty were drawn incorporating the defect and placing the expected incisions within the relaxed skin tension lines where possible. The area thus outlined was incised deep to adipose tissue with a #15 scalpel blade. The skin margins were undermined to an appropriate distance in all directions utilizing iris scissors.  The opposing advancement arms were then advanced into place in opposite direction and anchored with interrupted buried subcutaneous sutures.
W Plasty Text: The lesion was extirpated to the level of the fat with a #15 scalpel blade.  Given the location of the defect, shape of the defect and the proximity to free margins a W-plasty was deemed most appropriate for repair.  Using a sterile surgical marker, the appropriate transposition arms of the W-plasty were drawn incorporating the defect and placing the expected incisions within the relaxed skin tension lines where possible.    The area thus outlined was incised deep to adipose tissue with a #15 scalpel blade.  The skin margins were undermined to an appropriate distance in all directions utilizing iris scissors.  The opposing transposition arms were then transposed into place in opposite direction and anchored with interrupted buried subcutaneous sutures.
Z Plasty Text: The lesion was extirpated to the level of the fat with a #15 scalpel blade.  Given the location of the defect, shape of the defect and the proximity to free margins a Z-plasty was deemed most appropriate for repair.  Using a sterile surgical marker, the appropriate transposition arms of the Z-plasty were drawn incorporating the defect and placing the expected incisions within the relaxed skin tension lines where possible.    The area thus outlined was incised deep to adipose tissue with a #15 scalpel blade.  The skin margins were undermined to an appropriate distance in all directions utilizing iris scissors.  The opposing transposition arms were then transposed into place in opposite direction and anchored with interrupted buried subcutaneous sutures.
Zygomaticofacial Flap Text: Given the location of the defect, shape of the defect and the proximity to free margins a zygomaticofacial flap was deemed most appropriate for repair.  Using a sterile surgical marker, the appropriate flap was drawn incorporating the defect and placing the expected incisions within the relaxed skin tension lines where possible. The area thus outlined was incised deep to adipose tissue with a #15 scalpel blade with preservation of a vascular pedicle.  The skin margins were undermined to an appropriate distance in all directions utilizing iris scissors.  The flap was then placed into the defect and anchored with interrupted buried subcutaneous sutures.
Cheek Interpolation Flap Text: A decision was made to reconstruct the defect utilizing an interpolation axial flap and a staged reconstruction.  A telfa template was made of the defect.  This telfa template was then used to outline the Cheek Interpolation flap.  The donor area for the pedicle flap was then injected with anesthesia.  The flap was excised through the skin and subcutaneous tissue down to the layer of the underlying musculature.  The interpolation flap was carefully excised within this deep plane to maintain its blood supply.  The edges of the donor site were undermined.   The donor site was closed in a primary fashion.  The pedicle was then rotated into position and sutured.  Once the tube was sutured into place, adequate blood supply was confirmed with blanching and refill.  The pedicle was then wrapped with xeroform gauze and dressed appropriately with a telfa and gauze bandage to ensure continued blood supply and protect the attached pedicle.
Cheek-To-Nose Interpolation Flap Text: A decision was made to reconstruct the defect utilizing an interpolation axial flap and a staged reconstruction.  A telfa template was made of the defect.  This telfa template was then used to outline the Cheek-To-Nose Interpolation flap.  The donor area for the pedicle flap was then injected with anesthesia.  The flap was excised through the skin and subcutaneous tissue down to the layer of the underlying musculature.  The interpolation flap was carefully excised within this deep plane to maintain its blood supply.  The edges of the donor site were undermined.   The donor site was closed in a primary fashion.  The pedicle was then rotated into position and sutured.  Once the tube was sutured into place, adequate blood supply was confirmed with blanching and refill.  The pedicle was then wrapped with xeroform gauze and dressed appropriately with a telfa and gauze bandage to ensure continued blood supply and protect the attached pedicle.
Interpolation Flap Text: A decision was made to reconstruct the defect utilizing an interpolation axial flap and a staged reconstruction.  A telfa template was made of the defect.  This telfa template was then used to outline the interpolation flap.  The donor area for the pedicle flap was then injected with anesthesia.  The flap was excised through the skin and subcutaneous tissue down to the layer of the underlying musculature.  The interpolation flap was carefully excised within this deep plane to maintain its blood supply.  The edges of the donor site were undermined.   The donor site was closed in a primary fashion.  The pedicle was then rotated into position and sutured.  Once the tube was sutured into place, adequate blood supply was confirmed with blanching and refill.  The pedicle was then wrapped with xeroform gauze and dressed appropriately with a telfa and gauze bandage to ensure continued blood supply and protect the attached pedicle.
Melolabial Interpolation Flap Text: A decision was made to reconstruct the defect utilizing an interpolation axial flap and a staged reconstruction.  A telfa template was made of the defect.  This telfa template was then used to outline the melolabial interpolation flap.  The donor area for the pedicle flap was then injected with anesthesia.  The flap was excised through the skin and subcutaneous tissue down to the layer of the underlying musculature.  The pedicle flap was carefully excised within this deep plane to maintain its blood supply.  The edges of the donor site were undermined.   The donor site was closed in a primary fashion.  The pedicle was then rotated into position and sutured.  Once the tube was sutured into place, adequate blood supply was confirmed with blanching and refill.  The pedicle was then wrapped with xeroform gauze and dressed appropriately with a telfa and gauze bandage to ensure continued blood supply and protect the attached pedicle.
Mastoid Interpolation Flap Text: A decision was made to reconstruct the defect utilizing an interpolation axial flap and a staged reconstruction.  A telfa template was made of the defect.  This telfa template was then used to outline the mastoid interpolation flap.  The donor area for the pedicle flap was then injected with anesthesia.  The flap was excised through the skin and subcutaneous tissue down to the layer of the underlying musculature.  The pedicle flap was carefully excised within this deep plane to maintain its blood supply.  The edges of the donor site were undermined.   The donor site was closed in a primary fashion.  The pedicle was then rotated into position and sutured.  Once the tube was sutured into place, adequate blood supply was confirmed with blanching and refill.  The pedicle was then wrapped with xeroform gauze and dressed appropriately with a telfa and gauze bandage to ensure continued blood supply and protect the attached pedicle.
Posterior Auricular Interpolation Flap Text: A decision was made to reconstruct the defect utilizing an interpolation axial flap and a staged reconstruction.  A telfa template was made of the defect.  This telfa template was then used to outline the posterior auricular interpolation flap.  The donor area for the pedicle flap was then injected with anesthesia.  The flap was excised through the skin and subcutaneous tissue down to the layer of the underlying musculature.  The pedicle flap was carefully excised within this deep plane to maintain its blood supply.  The edges of the donor site were undermined.   The donor site was closed in a primary fashion.  The pedicle was then rotated into position and sutured.  Once the tube was sutured into place, adequate blood supply was confirmed with blanching and refill.  The pedicle was then wrapped with xeroform gauze and dressed appropriately with a telfa and gauze bandage to ensure continued blood supply and protect the attached pedicle.
Paramedian Forehead Flap Text: A decision was made to reconstruct the defect utilizing an interpolation axial flap and a staged reconstruction.  A telfa template was made of the defect.  This telfa template was then used to outline the paramedian forehead pedicle flap.  The donor area for the pedicle flap was then injected with anesthesia.  The flap was excised through the skin and subcutaneous tissue down to the layer of the underlying musculature.  The pedicle flap was carefully excised within this deep plane to maintain its blood supply.  The edges of the donor site were undermined.   The donor site was closed in a primary fashion.  The pedicle was then rotated into position and sutured.  Once the tube was sutured into place, adequate blood supply was confirmed with blanching and refill.  The pedicle was then wrapped with xeroform gauze and dressed appropriately with a telfa and gauze bandage to ensure continued blood supply and protect the attached pedicle.
Abbe Flap (Upper To Lower Lip) Text: The defect of the lower lip was assessed and measured.  Given the location and size of the defect, an Abbe flap was deemed most appropriate.  Using a sterile surgical marker, an appropriate Abbe flap was measured and drawn on the upper lip. Local anesthesia was then infiltrated.  A scalpel was then used to incise the upper lip through and through the skin, vermilion, muscle and mucosa, leaving the flap pedicled on the opposite side.  The flap was then rotated and transferred to the lower lip defect.  The flap was then sutured into place with a three layer technique, closing the orbicularis oris muscle layer with subcutaneous buried sutures, followed by a mucosal layer and an epidermal layer.
Abbe Flap (Lower To Upper Lip) Text: The defect of the upper lip was assessed and measured.  Given the location and size of the defect, an Abbe flap was deemed most appropriate.  Using a sterile surgical marker, an appropriate Abbe flap was measured and drawn on the lower lip. Local anesthesia was then infiltrated. A scalpel was then used to incise the upper lip through and through the skin, vermilion, muscle and mucosa, leaving the flap pedicled on the opposite side.  The flap was then rotated and transferred to the lower lip defect.  The flap was then sutured into place with a three layer technique, closing the orbicularis oris muscle layer with subcutaneous buried sutures, followed by a mucosal layer and an epidermal layer.
Estlander Flap (Upper To Lower Lip) Text: The defect of the lower lip was assessed and measured.  Given the location and size of the defect, an Estlander flap was deemed most appropriate.  Using a sterile surgical marker, an appropriate Estlander flap was measured and drawn on the upper lip. Local anesthesia was then infiltrated. A scalpel was then used to incise the lateral aspect of the flap, through skin, muscle and mucosa, leaving the flap pedicled medially.  The flap was then rotated and positioned to fill the lower lip defect.  The flap was then sutured into place with a three layer technique, closing the orbicularis oris muscle layer with subcutaneous buried sutures, followed by a mucosal layer and an epidermal layer.
Lip Wedge Excision Repair Text: Given the location of the defect and the proximity to free margins a full thickness wedge repair was deemed most appropriate.  Using a sterile surgical marker, the appropriate repair was drawn incorporating the defect and placing the expected incisions perpendicular to the vermilion border.  The vermilion border was also meticulously outlined to ensure appropriate reapproximation during the repair.  The area thus outlined was incised through and through with a #15 scalpel blade.  The muscularis and dermis were reaproximated with deep sutures following hemostasis. Care was taken to realign the vermilion border before proceeding with the superficial closure.  Once the vermilion was realigned the superfical and mucosal closure was finished.
Ftsg Text: The defect edges were debeveled with a #15 scalpel blade.  Given the location of the defect, shape of the defect and the proximity to free margins a full thickness skin graft was deemed most appropriate.  Using a sterile surgical marker, the primary defect shape was transferred to the donor site. The area thus outlined was incised deep to adipose tissue with a #15 scalpel blade.  The harvested graft was then trimmed of adipose tissue until only dermis and epidermis was left.  The skin margins of the secondary defect were undermined to an appropriate distance in all directions utilizing iris scissors.  The secondary defect was closed with interrupted buried subcutaneous sutures.  The skin edges were then re-apposed with running  sutures.  The skin graft was then placed in the primary defect and oriented appropriately.
Split-Thickness Skin Graft Text: The defect edges were debeveled with a #15 scalpel blade.  Given the location of the defect, shape of the defect and the proximity to free margins a split thickness skin graft was deemed most appropriate.  Using a sterile surgical marker, the primary defect shape was transferred to the donor site. The split thickness graft was then harvested.  The skin graft was then placed in the primary defect and oriented appropriately.
Burow's Graft Text: The defect edges were debeveled with a #15 scalpel blade.  Given the location of the defect, shape of the defect, the proximity to free margins and the presence of a standing cone deformity a Burow's skin graft was deemed most appropriate. The standing cone was removed and this tissue was then trimmed to the shape of the primary defect. The adipose tissue was also removed until only dermis and epidermis were left.  The skin margins of the secondary defect were undermined to an appropriate distance in all directions utilizing iris scissors.  The secondary defect was closed with interrupted buried subcutaneous sutures.  The skin edges were then re-apposed with running  sutures.  The skin graft was then placed in the primary defect and oriented appropriately.
Cartilage Graft Text: The defect edges were debeveled with a #15 scalpel blade.  Given the location of the defect, shape of the defect, the fact the defect involved a full thickness cartilage defect a cartilage graft was deemed most appropriate.  An appropriate donor site was identified, cleansed, and anesthetized. The cartilage graft was then harvested and transferred to the recipient site, oriented appropriately and then sutured into place.  The secondary defect was then repaired using a primary closure.
Composite Graft Text: The defect edges were debeveled with a #15 scalpel blade.  Given the location of the defect, shape of the defect, the proximity to free margins and the fact the defect was full thickness a composite graft was deemed most appropriate.  The defect was outline and then transferred to the donor site.  A full thickness graft was then excised from the donor site. The graft was then placed in the primary defect, oriented appropriately and then sutured into place.  The secondary defect was then repaired using a primary closure.
Epidermal Autograft Text: The defect edges were debeveled with a #15 scalpel blade.  Given the location of the defect, shape of the defect and the proximity to free margins an epidermal autograft was deemed most appropriate.  Using a sterile surgical marker, the primary defect shape was transferred to the donor site. The epidermal graft was then harvested.  The skin graft was then placed in the primary defect and oriented appropriately.
Dermal Autograft Text: The defect edges were debeveled with a #15 scalpel blade.  Given the location of the defect, shape of the defect and the proximity to free margins a dermal autograft was deemed most appropriate.  Using a sterile surgical marker, the primary defect shape was transferred to the donor site. The area thus outlined was incised deep to adipose tissue with a #15 scalpel blade.  The harvested graft was then trimmed of adipose and epidermal tissue until only dermis was left.  The skin graft was then placed in the primary defect and oriented appropriately.
Skin Substitute Text: The defect edges were debeveled with a #15 scalpel blade.  Given the location of the defect, shape of the defect and the proximity to free margins a skin substitute graft was deemed most appropriate.  The graft material was trimmed to fit the size of the defect. The graft was then placed in the primary defect and oriented appropriately.
Tissue Cultured Epidermal Autograft Text: The defect edges were debeveled with a #15 scalpel blade.  Given the location of the defect, shape of the defect and the proximity to free margins a tissue cultured epidermal autograft was deemed most appropriate.  The graft was then trimmed to fit the size of the defect.  The graft was then placed in the primary defect and oriented appropriately.
Xenograft Text: The defect edges were debeveled with a #15 scalpel blade.  Given the location of the defect, shape of the defect and the proximity to free margins a xenograft was deemed most appropriate.  The graft was then trimmed to fit the size of the defect.  The graft was then placed in the primary defect and oriented appropriately.
Purse String (Intermediate) Text: Given the location of the defect and the characteristics of the surrounding skin a purse string intermediate closure was deemed most appropriate.  Undermining was performed circumferentially around the surgical defect.  A purse string suture was then placed and tightened.
Purse String (Simple) Text: Given the location of the defect and the characteristics of the surrounding skin a purse string simple closure was deemed most appropriate.  Undermining was performed circumferentially around the surgical defect.  A purse string suture was then placed and tightened.
Complex Repair And Single Advancement Flap Text: The defect edges were debeveled with a #15 scalpel blade.  The primary defect was closed partially with a complex linear closure.  Given the location of the remaining defect, shape of the defect and the proximity to free margins a single advancement flap was deemed most appropriate for complete closure of the defect.  Using a sterile surgical marker, an appropriate advancement flap was drawn incorporating the defect and placing the expected incisions within the relaxed skin tension lines where possible.    The area thus outlined was incised deep to adipose tissue with a #15 scalpel blade.  The skin margins were undermined to an appropriate distance in all directions utilizing iris scissors.
Complex Repair And Double Advancement Flap Text: The defect edges were debeveled with a #15 scalpel blade.  The primary defect was closed partially with a complex linear closure.  Given the location of the remaining defect, shape of the defect and the proximity to free margins a double advancement flap was deemed most appropriate for complete closure of the defect.  Using a sterile surgical marker, an appropriate advancement flap was drawn incorporating the defect and placing the expected incisions within the relaxed skin tension lines where possible.    The area thus outlined was incised deep to adipose tissue with a #15 scalpel blade.  The skin margins were undermined to an appropriate distance in all directions utilizing iris scissors.
Complex Repair And Modified Advancement Flap Text: The defect edges were debeveled with a #15 scalpel blade.  The primary defect was closed partially with a complex linear closure.  Given the location of the remaining defect, shape of the defect and the proximity to free margins a modified advancement flap was deemed most appropriate for complete closure of the defect.  Using a sterile surgical marker, an appropriate advancement flap was drawn incorporating the defect and placing the expected incisions within the relaxed skin tension lines where possible.    The area thus outlined was incised deep to adipose tissue with a #15 scalpel blade.  The skin margins were undermined to an appropriate distance in all directions utilizing iris scissors.
Complex Repair And A-T Advancement Flap Text: The defect edges were debeveled with a #15 scalpel blade.  The primary defect was closed partially with a complex linear closure.  Given the location of the remaining defect, shape of the defect and the proximity to free margins an A-T advancement flap was deemed most appropriate for complete closure of the defect.  Using a sterile surgical marker, an appropriate advancement flap was drawn incorporating the defect and placing the expected incisions within the relaxed skin tension lines where possible.    The area thus outlined was incised deep to adipose tissue with a #15 scalpel blade.  The skin margins were undermined to an appropriate distance in all directions utilizing iris scissors.
Complex Repair And O-T Advancement Flap Text: The defect edges were debeveled with a #15 scalpel blade.  The primary defect was closed partially with a complex linear closure.  Given the location of the remaining defect, shape of the defect and the proximity to free margins an O-T advancement flap was deemed most appropriate for complete closure of the defect.  Using a sterile surgical marker, an appropriate advancement flap was drawn incorporating the defect and placing the expected incisions within the relaxed skin tension lines where possible.    The area thus outlined was incised deep to adipose tissue with a #15 scalpel blade.  The skin margins were undermined to an appropriate distance in all directions utilizing iris scissors.
Complex Repair And O-L Flap Text: The defect edges were debeveled with a #15 scalpel blade.  The primary defect was closed partially with a complex linear closure.  Given the location of the remaining defect, shape of the defect and the proximity to free margins an O-L flap was deemed most appropriate for complete closure of the defect.  Using a sterile surgical marker, an appropriate flap was drawn incorporating the defect and placing the expected incisions within the relaxed skin tension lines where possible.    The area thus outlined was incised deep to adipose tissue with a #15 scalpel blade.  The skin margins were undermined to an appropriate distance in all directions utilizing iris scissors.
Complex Repair And Bilobe Flap Text: The defect edges were debeveled with a #15 scalpel blade.  The primary defect was closed partially with a complex linear closure.  Given the location of the remaining defect, shape of the defect and the proximity to free margins a bilobe flap was deemed most appropriate for complete closure of the defect.  Using a sterile surgical marker, an appropriate advancement flap was drawn incorporating the defect and placing the expected incisions within the relaxed skin tension lines where possible.    The area thus outlined was incised deep to adipose tissue with a #15 scalpel blade.  The skin margins were undermined to an appropriate distance in all directions utilizing iris scissors.
Complex Repair And Melolabial Flap Text: The defect edges were debeveled with a #15 scalpel blade.  The primary defect was closed partially with a complex linear closure.  Given the location of the remaining defect, shape of the defect and the proximity to free margins a melolabial flap was deemed most appropriate for complete closure of the defect.  Using a sterile surgical marker, an appropriate advancement flap was drawn incorporating the defect and placing the expected incisions within the relaxed skin tension lines where possible.    The area thus outlined was incised deep to adipose tissue with a #15 scalpel blade.  The skin margins were undermined to an appropriate distance in all directions utilizing iris scissors.
Complex Repair And Rotation Flap Text: The defect edges were debeveled with a #15 scalpel blade.  The primary defect was closed partially with a complex linear closure.  Given the location of the remaining defect, shape of the defect and the proximity to free margins a rotation flap was deemed most appropriate for complete closure of the defect.  Using a sterile surgical marker, an appropriate advancement flap was drawn incorporating the defect and placing the expected incisions within the relaxed skin tension lines where possible.    The area thus outlined was incised deep to adipose tissue with a #15 scalpel blade.  The skin margins were undermined to an appropriate distance in all directions utilizing iris scissors.
Complex Repair And Rhombic Flap Text: The defect edges were debeveled with a #15 scalpel blade.  The primary defect was closed partially with a complex linear closure.  Given the location of the remaining defect, shape of the defect and the proximity to free margins a rhombic flap was deemed most appropriate for complete closure of the defect.  Using a sterile surgical marker, an appropriate advancement flap was drawn incorporating the defect and placing the expected incisions within the relaxed skin tension lines where possible.    The area thus outlined was incised deep to adipose tissue with a #15 scalpel blade.  The skin margins were undermined to an appropriate distance in all directions utilizing iris scissors.
Complex Repair And Transposition Flap Text: The defect edges were debeveled with a #15 scalpel blade.  The primary defect was closed partially with a complex linear closure.  Given the location of the remaining defect, shape of the defect and the proximity to free margins a transposition flap was deemed most appropriate for complete closure of the defect.  Using a sterile surgical marker, an appropriate advancement flap was drawn incorporating the defect and placing the expected incisions within the relaxed skin tension lines where possible.    The area thus outlined was incised deep to adipose tissue with a #15 scalpel blade.  The skin margins were undermined to an appropriate distance in all directions utilizing iris scissors.
Complex Repair And V-Y Plasty Text: The defect edges were debeveled with a #15 scalpel blade.  The primary defect was closed partially with a complex linear closure.  Given the location of the remaining defect, shape of the defect and the proximity to free margins a V-Y plasty was deemed most appropriate for complete closure of the defect.  Using a sterile surgical marker, an appropriate advancement flap was drawn incorporating the defect and placing the expected incisions within the relaxed skin tension lines where possible.    The area thus outlined was incised deep to adipose tissue with a #15 scalpel blade.  The skin margins were undermined to an appropriate distance in all directions utilizing iris scissors.
Complex Repair And M Plasty Text: The defect edges were debeveled with a #15 scalpel blade.  The primary defect was closed partially with a complex linear closure.  Given the location of the remaining defect, shape of the defect and the proximity to free margins an M plasty was deemed most appropriate for complete closure of the defect.  Using a sterile surgical marker, an appropriate advancement flap was drawn incorporating the defect and placing the expected incisions within the relaxed skin tension lines where possible.    The area thus outlined was incised deep to adipose tissue with a #15 scalpel blade.  The skin margins were undermined to an appropriate distance in all directions utilizing iris scissors.
Complex Repair And Double M Plasty Text: The defect edges were debeveled with a #15 scalpel blade.  The primary defect was closed partially with a complex linear closure.  Given the location of the remaining defect, shape of the defect and the proximity to free margins a double M plasty was deemed most appropriate for complete closure of the defect.  Using a sterile surgical marker, an appropriate advancement flap was drawn incorporating the defect and placing the expected incisions within the relaxed skin tension lines where possible.    The area thus outlined was incised deep to adipose tissue with a #15 scalpel blade.  The skin margins were undermined to an appropriate distance in all directions utilizing iris scissors.
Complex Repair And W Plasty Text: The defect edges were debeveled with a #15 scalpel blade.  The primary defect was closed partially with a complex linear closure.  Given the location of the remaining defect, shape of the defect and the proximity to free margins a W plasty was deemed most appropriate for complete closure of the defect.  Using a sterile surgical marker, an appropriate advancement flap was drawn incorporating the defect and placing the expected incisions within the relaxed skin tension lines where possible.    The area thus outlined was incised deep to adipose tissue with a #15 scalpel blade.  The skin margins were undermined to an appropriate distance in all directions utilizing iris scissors.
Complex Repair And Z Plasty Text: The defect edges were debeveled with a #15 scalpel blade.  The primary defect was closed partially with a complex linear closure.  Given the location of the remaining defect, shape of the defect and the proximity to free margins a Z plasty was deemed most appropriate for complete closure of the defect.  Using a sterile surgical marker, an appropriate advancement flap was drawn incorporating the defect and placing the expected incisions within the relaxed skin tension lines where possible.    The area thus outlined was incised deep to adipose tissue with a #15 scalpel blade.  The skin margins were undermined to an appropriate distance in all directions utilizing iris scissors.
Complex Repair And Dorsal Nasal Flap Text: The defect edges were debeveled with a #15 scalpel blade.  The primary defect was closed partially with a complex linear closure.  Given the location of the remaining defect, shape of the defect and the proximity to free margins a dorsal nasal flap was deemed most appropriate for complete closure of the defect.  Using a sterile surgical marker, an appropriate flap was drawn incorporating the defect and placing the expected incisions within the relaxed skin tension lines where possible.    The area thus outlined was incised deep to adipose tissue with a #15 scalpel blade.  The skin margins were undermined to an appropriate distance in all directions utilizing iris scissors.
Complex Repair And Ftsg Text: The defect edges were debeveled with a #15 scalpel blade.  The primary defect was closed partially with a complex linear closure.  Given the location of the defect, shape of the defect and the proximity to free margins a full thickness skin graft was deemed most appropriate to repair the remaining defect.  The graft was trimmed to fit the size of the remaining defect.  The graft was then placed in the primary defect, oriented appropriately, and sutured into place.
Complex Repair And Burow's Graft Text: The defect edges were debeveled with a #15 scalpel blade.  The primary defect was closed partially with a complex linear closure.  Given the location of the defect, shape of the defect, the proximity to free margins and the presence of a standing cone deformity a Burow's graft was deemed most appropriate to repair the remaining defect.  The graft was trimmed to fit the size of the remaining defect.  The graft was then placed in the primary defect, oriented appropriately, and sutured into place.
Complex Repair And Split-Thickness Skin Graft Text: The defect edges were debeveled with a #15 scalpel blade.  The primary defect was closed partially with a complex linear closure.  Given the location of the defect, shape of the defect and the proximity to free margins a split thickness skin graft was deemed most appropriate to repair the remaining defect.  The graft was trimmed to fit the size of the remaining defect.  The graft was then placed in the primary defect, oriented appropriately, and sutured into place.
Complex Repair And Epidermal Autograft Text: The defect edges were debeveled with a #15 scalpel blade.  The primary defect was closed partially with a complex linear closure.  Given the location of the defect, shape of the defect and the proximity to free margins an epidermal autograft was deemed most appropriate to repair the remaining defect.  The graft was trimmed to fit the size of the remaining defect.  The graft was then placed in the primary defect, oriented appropriately, and sutured into place.
Complex Repair And Dermal Autograft Text: The defect edges were debeveled with a #15 scalpel blade.  The primary defect was closed partially with a complex linear closure.  Given the location of the defect, shape of the defect and the proximity to free margins an dermal autograft was deemed most appropriate to repair the remaining defect.  The graft was trimmed to fit the size of the remaining defect.  The graft was then placed in the primary defect, oriented appropriately, and sutured into place.
Complex Repair And Tissue Cultured Epidermal Autograft Text: The defect edges were debeveled with a #15 scalpel blade.  The primary defect was closed partially with a complex linear closure.  Given the location of the defect, shape of the defect and the proximity to free margins an tissue cultured epidermal autograft was deemed most appropriate to repair the remaining defect.  The graft was trimmed to fit the size of the remaining defect.  The graft was then placed in the primary defect, oriented appropriately, and sutured into place.
Complex Repair And Xenograft Text: The defect edges were debeveled with a #15 scalpel blade.  The primary defect was closed partially with a complex linear closure.  Given the location of the defect, shape of the defect and the proximity to free margins a xenograft was deemed most appropriate to repair the remaining defect.  The graft was trimmed to fit the size of the remaining defect.  The graft was then placed in the primary defect, oriented appropriately, and sutured into place.
Complex Repair And Skin Substitute Graft Text: The defect edges were debeveled with a #15 scalpel blade.  The primary defect was closed partially with a complex linear closure.  Given the location of the remaining defect, shape of the defect and the proximity to free margins a skin substitute graft was deemed most appropriate to repair the remaining defect.  The graft was trimmed to fit the size of the remaining defect.  The graft was then placed in the primary defect, oriented appropriately, and sutured into place.
Path Notes (To The Dermatopathologist): Please check margins.
Consent was obtained from the patient. The risks and benefits to therapy were discussed in detail. Specifically, the risks of infection, scarring, bleeding, prolonged wound healing, incomplete removal, allergy to anesthesia, nerve injury and recurrence were addressed. Prior to the procedure, the treatment site was clearly identified and confirmed by the patient. All components of Universal Protocol/PAUSE Rule completed.
Render Post-Care Instructions In Note?: yes
Post-Care Instructions: I reviewed with the patient in detail post-care instructions. Patient is not to engage in any heavy lifting, exercise, or swimming for the next 14 days. Should the patient develop any fevers, chills, bleeding, severe pain patient will contact the office immediately.
Home Suture Removal Text: Patient was provided a home suture removal kit and will remove their sutures at home.  If they have any questions or difficulties they will call the office.
Where Do You Want The Question To Include Opioid Counseling Located?: Case Summary Tab
Billing Type: Third-Party Bill
Information: Selecting Yes will display possible errors in your note based on the variables you have selected. This validation is only offered as a suggestion for you. PLEASE NOTE THAT THE VALIDATION TEXT WILL BE REMOVED WHEN YOU FINALIZE YOUR NOTE. IF YOU WANT TO FAX A PRELIMINARY NOTE YOU WILL NEED TO TOGGLE THIS TO 'NO' IF YOU DO NOT WANT IT IN YOUR FAXED NOTE.
Size Of Lesion In Cm: 1.7
Intermediate / Complex Repair - Final Wound Length In Cm: 1.8
Deep Sutures: 3-0 Maxon
Epidermal Sutures: 3-0 Surgipro

## 2023-02-23 ENCOUNTER — OFFICE VISIT (OUTPATIENT)
Dept: MEDICAL GROUP | Facility: PHYSICIAN GROUP | Age: 48
End: 2023-02-23
Payer: COMMERCIAL

## 2023-02-23 VITALS
RESPIRATION RATE: 18 BRPM | BODY MASS INDEX: 34.65 KG/M2 | HEIGHT: 72 IN | WEIGHT: 255.8 LBS | OXYGEN SATURATION: 97 % | TEMPERATURE: 97.9 F | SYSTOLIC BLOOD PRESSURE: 118 MMHG | HEART RATE: 108 BPM | DIASTOLIC BLOOD PRESSURE: 80 MMHG

## 2023-02-23 DIAGNOSIS — K21.9 GASTROESOPHAGEAL REFLUX DISEASE, UNSPECIFIED WHETHER ESOPHAGITIS PRESENT: ICD-10-CM

## 2023-02-23 DIAGNOSIS — F51.01 PRIMARY INSOMNIA: ICD-10-CM

## 2023-02-23 DIAGNOSIS — N52.9 ERECTILE DYSFUNCTION, UNSPECIFIED ERECTILE DYSFUNCTION TYPE: ICD-10-CM

## 2023-02-23 PROBLEM — D75.1 SECONDARY POLYCYTHEMIA: Status: ACTIVE | Noted: 2022-05-04

## 2023-02-23 PROBLEM — I10 ESSENTIAL HYPERTENSION: Status: ACTIVE | Noted: 2022-08-25

## 2023-02-23 PROBLEM — N13.8 BENIGN PROSTATIC HYPERPLASIA WITH URINARY OBSTRUCTION: Status: ACTIVE | Noted: 2022-11-10

## 2023-02-23 PROBLEM — R35.1 NOCTURIA: Status: ACTIVE | Noted: 2022-11-10

## 2023-02-23 PROBLEM — F13.20 HYPNOTIC DEPENDENCE WITH CURRENT USE (HCC): Status: ACTIVE | Noted: 2022-08-25

## 2023-02-23 PROBLEM — N40.1 BENIGN PROSTATIC HYPERPLASIA WITH URINARY OBSTRUCTION: Status: ACTIVE | Noted: 2022-11-10

## 2023-02-23 PROCEDURE — 99214 OFFICE O/P EST MOD 30 MIN: CPT | Performed by: INTERNAL MEDICINE

## 2023-02-23 RX ORDER — SILDENAFIL 50 MG/1
50 TABLET, FILM COATED ORAL
Qty: 10 TABLET | Refills: 3 | Status: SHIPPED | OUTPATIENT
Start: 2023-02-23 | End: 2023-07-26 | Stop reason: SDUPTHER

## 2023-02-23 RX ORDER — SUCRALFATE ORAL 1 G/10ML
1 SUSPENSION ORAL 4 TIMES DAILY
Qty: 414 ML | Refills: 3 | Status: SHIPPED | OUTPATIENT
Start: 2023-02-23

## 2023-02-23 RX ORDER — PANTOPRAZOLE SODIUM 40 MG/1
40 TABLET, DELAYED RELEASE ORAL DAILY
Qty: 90 TABLET | Refills: 3 | Status: SHIPPED | OUTPATIENT
Start: 2023-02-23 | End: 2023-07-24

## 2023-02-23 ASSESSMENT — PATIENT HEALTH QUESTIONNAIRE - PHQ9: CLINICAL INTERPRETATION OF PHQ2 SCORE: 0

## 2023-02-23 NOTE — PROGRESS NOTES
"Subjective:     CC:   Chief Complaint   Patient presents with    Follow-Up     Heartburn           HPI:   Lauro presents today for follow-up visit and to discuss the following issues:    Primary insomnia  The patient reports symptoms of insomnia are well controlled on his current regimen of zolpidem 12.5 mg CR nightly.  There has been no evidence of dose escalation.  He just received a refill prior to today's visit.     Gastroesophageal reflux disease, unspecified whether esophagitis present  The patient reports heartburn symptoms.  Denies nausea or vomiting or unintentional weight loss.    Erectile dysfunction, unspecified erectile dysfunction type  The patient reports erectile dysfunction and would like to try a medication for this condition.  He denies diminished libido.  He is on TRT.        Past Medical History:   Diagnosis Date    Acute maxillary sinusitis 4/1/2021    Asthma     as a child    Asthma exacerbation 4/13/2014    Hypoxia 4/13/2014    Methamphetamine abuse in remission (HCC) 7/9/2020    Obesity (BMI 30-39.9) 6/24/2018       Social History     Tobacco Use    Smoking status: Never    Smokeless tobacco: Current     Types: Chew   Vaping Use    Vaping Use: Never used   Substance Use Topics    Alcohol use: No    Drug use: No       Current Outpatient Medications Ordered in Epic   Medication Sig Dispense Refill    sildenafil citrate (VIAGRA) 50 MG tablet Take 1 Tablet by mouth 1 time a day as needed for Erectile Dysfunction. 10 Tablet 3    pantoprazole (PROTONIX) 40 MG Tablet Delayed Response Take 1 Tablet by mouth every day. 90 Tablet 3    sucralfate (CARAFATE) 1 GM/10ML Suspension Take 10 mL by mouth 4 times a day. 414 mL 3    B-D HYPODERMIC NEEDLE 18GX1.5\" 18G X 1-1/2\" Misc USE AS DRAW NEED FOR TESTOSTERONE ADMINISTRATION      testosterone cypionate (DEPO-TESTOSTERONE) 200 MG/ML Solution injection INJECT 2 ML EVERY 2 WEEKS BY INTRAMUSCULAR ROUTE FOR 30 DAYS      pantoprazole (PROTONIX) 40 MG Tablet " Delayed Response Take 1 Tablet by mouth every day. 90 Tablet 3     No current Epic-ordered facility-administered medications on file.       Allergies:  Sulfa drugs    Health Maintenance: Completed    Review of Systems:  No fevers or alcohol,. No cough, chest pain, or shortness of breath.       Objective:       Exam:  /80 (BP Location: Right arm, Patient Position: Sitting, BP Cuff Size: Adult)   Pulse (!) 108   Temp 36.6 °C (97.9 °F) (Temporal)   Resp 18   Ht 1.829 m (6')   Wt 116 kg (255 lb 12.8 oz)   SpO2 97%   BMI 34.69 kg/m²  Body mass index is 34.69 kg/m².    Gen: Alert and oriented, No apparent distress.  Lungs: Normal effort, CTA bilaterally, no wheezes, rhonchi, or rales  CV: Regular rate and rhythm. No murmurs, rubs, or gallops.  Ext: No clubbing, cyanosis, edema.      Assessment & Plan:     47 y.o. male with the following -     Primary insomnia  Chronic medical condition.  Ongoing and well-controlled.  The patient has tried Ambien 5 mg, trazodone, Seroquel without much relief.  The patient is now on zolpidem 12.5 mg CR nightly as needed.  Review of the patient's  shows that he was last prescribed zolpidem 12.5 mg CR, dispo #30, on 1/20/2023. Obtained and reviewed patient utilization report from [facility] pharmacy database on 2/23/2023 12:02 PM  prior to writing prescription for controlled substance II, III or IV per [place] bill . Based on assessment of the report, the prescription is medically necessary.   -Controlled substance treatment agreement signed and scanned into the patient's chart on 2/23/2023  -Controlled Substance Treatment Agreement  -Continue zolpidem 12.5 mg CR nightly, refill sent prior to visit    Gastroesophageal reflux disease, unspecified whether esophagitis present  Acute medical condition.  The patient reports heartburn symptoms.  Denies nausea or vomiting or unintentional weight loss.  -Discussed lifestyle modifications such as eating small meals, not eating 3  hours before bedtime, elevating head of bed, and avoidance of triggering foods such as alcohol, spicy foods, tomatoes or tomato based products, citrus products  -Start trial of daily PPI such as pantoprazole and sucralfate as needed.  - pantoprazole (PROTONIX) 40 MG Tablet Delayed Response; Take 1 Tablet by mouth every day.  Dispense: 90 Tablet; Refill: 3  - sucralfate (CARAFATE) 1 GM/10ML Suspension; Take 10 mL by mouth 4 times a day.  Dispense: 414 mL; Refill: 3    Erectile dysfunction, unspecified erectile dysfunction type  Acute medical condition.  The patient reports erectile dysfunction and would like to try a medication for this condition.  He denies diminished libido.  He is on TRT.  -Start trial of sildenafil 50 mg as needed for ED, risks, benefits, and side effects of the medication discussed with the patient who expressed verbal understanding  - sildenafil citrate (VIAGRA) 50 MG tablet; Take 1 Tablet by mouth 1 time a day as needed for Erectile Dysfunction.  Dispense: 10 Tablet; Refill: 3      Return in about 6 months (around 8/23/2023) for 6-month f/u visit.    Please note that this dictation was created using voice recognition software. I have made every reasonable attempt to correct obvious errors, but I expect that there are errors of grammar and possibly content that I did not discover before finalizing the note.

## 2023-03-02 ENCOUNTER — APPOINTMENT (RX ONLY)
Dept: URBAN - METROPOLITAN AREA CLINIC 36 | Facility: CLINIC | Age: 48
Setting detail: DERMATOLOGY
End: 2023-03-02

## 2023-03-02 DIAGNOSIS — Z48.02 ENCOUNTER FOR REMOVAL OF SUTURES: ICD-10-CM

## 2023-03-02 PROCEDURE — ? SUTURE REMOVAL (GLOBAL PERIOD)

## 2023-03-02 ASSESSMENT — LOCATION DETAILED DESCRIPTION DERM: LOCATION DETAILED: LEFT ANTERIOR PROXIMAL UPPER ARM

## 2023-03-02 ASSESSMENT — LOCATION ZONE DERM: LOCATION ZONE: ARM

## 2023-03-02 ASSESSMENT — LOCATION SIMPLE DESCRIPTION DERM: LOCATION SIMPLE: LEFT UPPER ARM

## 2023-03-02 NOTE — PROCEDURE: SUTURE REMOVAL (GLOBAL PERIOD)
Detail Level: Detailed
Add 37491 Cpt? (Important Note: In 2017 The Use Of 20644 Is Being Tracked By Cms To Determine Future Global Period Reimbursement For Global Periods): no

## 2023-03-13 DIAGNOSIS — F51.01 PRIMARY INSOMNIA: ICD-10-CM

## 2023-03-13 RX ORDER — PANTOPRAZOLE SODIUM 40 MG/1
40 TABLET, DELAYED RELEASE ORAL DAILY
Qty: 90 TABLET | Refills: 3 | Status: SHIPPED | OUTPATIENT
Start: 2023-03-13

## 2023-03-13 RX ORDER — ZOLPIDEM TARTRATE 12.5 MG/1
TABLET, FILM COATED, EXTENDED RELEASE ORAL
Qty: 30 TABLET | Refills: 0 | OUTPATIENT
Start: 2023-03-13 | End: 2023-04-12

## 2023-03-15 ENCOUNTER — APPOINTMENT (RX ONLY)
Dept: URBAN - METROPOLITAN AREA CLINIC 36 | Facility: CLINIC | Age: 48
Setting detail: DERMATOLOGY
End: 2023-03-15

## 2023-03-15 DIAGNOSIS — D17 BENIGN LIPOMATOUS NEOPLASM: ICD-10-CM

## 2023-03-15 PROBLEM — D17.22 BENIGN LIPOMATOUS NEOPLASM OF SKIN AND SUBCUTANEOUS TISSUE OF LEFT ARM: Status: ACTIVE | Noted: 2023-03-15

## 2023-03-15 PROCEDURE — 12032 INTMD RPR S/A/T/EXT 2.6-7.5: CPT

## 2023-03-15 PROCEDURE — 11403 EXC TR-EXT B9+MARG 2.1-3CM: CPT | Mod: 76

## 2023-03-15 PROCEDURE — 11403 EXC TR-EXT B9+MARG 2.1-3CM: CPT

## 2023-03-15 PROCEDURE — 11404 EXC TR-EXT B9+MARG 3.1-4 CM: CPT

## 2023-03-15 PROCEDURE — ? EXCISION

## 2023-03-15 ASSESSMENT — LOCATION DETAILED DESCRIPTION DERM
LOCATION DETAILED: LEFT DISTAL MEDIAL POSTERIOR UPPER ARM
LOCATION DETAILED: LEFT PROXIMAL ULNAR DORSAL FOREARM
LOCATION DETAILED: LEFT MEDIAL ELBOW

## 2023-03-15 ASSESSMENT — LOCATION SIMPLE DESCRIPTION DERM
LOCATION SIMPLE: LEFT ELBOW
LOCATION SIMPLE: LEFT FOREARM
LOCATION SIMPLE: LEFT UPPER ARM

## 2023-03-15 ASSESSMENT — LOCATION ZONE DERM: LOCATION ZONE: ARM

## 2023-03-15 NOTE — PROCEDURE: EXCISION
Medical Necessity Information: It is in your best interest to select a reason for this procedure from the list below. All of these items fulfill various CMS LCD requirements except lesion extends to a margin.
Include Z78.9 (Other Specified Conditions Influencing Health Status) As An Associated Diagnosis?: No
Medical Necessity Clause: This procedure was medically necessary because the lesion that was treated was:
Lab: [id number]
Lab Facility: 
Surgeon Performing Repair (Optional): Jael Mix
Size Of Lesion In Cm: 2.5
X Size Of Lesion In Cm (Optional): 0
Size Of Margin In Cm: 0.2
Anesthesia Volume In Cc: 3
Eye Clamp Note Details: An eye clamp was used during the procedure.
Excision Method: Slit
Saucerization Depth: dermis and superficial adipose tissue
Repair Type: Intermediate
Intermediate / Complex Repair - Final Wound Length In Cm: 2.4
Suturegard Retention Suture: 2-0 Nylon
Retention Suture Bite Size: 3 mm
Length To Time In Minutes Device Was In Place: 10
Number Of Hemigard Strips Per Side: 1
Undermining Type: Entire Wound
Debridement Text: The wound edges were debrided prior to proceeding with the closure to facilitate wound healing.
Helical Rim Text: The closure involved the helical rim.
Vermilion Border Text: The closure involved the vermilion border.
Nostril Rim Text: The closure involved the nostril rim.
Retention Suture Text: Retention sutures were placed to support the closure and prevent dehiscence.
Epidermal Closure Graft Donor Site (Optional): simple interrupted
Graft Donor Site Bandage (Optional-Leave Blank If You Don't Want In Note): Steri-strips and a pressure bandage were applied to the donor site.
Detail Level: Detailed
Excision Depth: adipose tissue
Scalpel Size: 15 blade
Anesthesia Type: 1% lidocaine with epinephrine
Additional Anesthesia Volume In Cc: 6
Hemostasis: Electrocautery
Estimated Blood Loss (Cc): minimal
Deep Sutures: 5-0 Maxon
Epidermal Sutures: staples
Wound Care: Vaseline
Dressing: pressure dressing with telfa
Wound Check: 7 days
Suturegard Intro: Intraoperative tissue expansion was performed, utilizing the SUTUREGARD device, in order to reduce wound tension.
Suturegard Body: The suture ends were repeatedly re-tightened and re-clamped to achieve the desired tissue expansion.
Hemigard Intro: Due to skin fragility and wound tension, it was decided to use HEMIGARD adhesive retention suture devices to permit a linear closure. The skin was cleaned and dried for a 6cm distance away from the wound. Excessive hair, if present, was removed to allow for adhesion.
Hemigard Postcare Instructions: The HEMIGARD strips are to remain completely dry for at least 5-7 days.
Positioning (Leave Blank If You Do Not Want): The patient was placed in a comfortable position exposing the surgical site.
Complex Repair Preamble Text (Leave Blank If You Do Not Want): Extensive wide undermining was performed.
Intermediate Repair Preamble Text (Leave Blank If You Do Not Want): Undermining was performed with blunt dissection.
Fusiform Excision Additional Text (Leave Blank If You Do Not Want): The margin was drawn around the clinically apparent lesion.  A fusiform shape was then drawn on the skin incorporating the lesion and margins.  Incisions were then made along these lines to the appropriate tissue plane and the lesion was extirpated.
Eliptical Excision Additional Text (Leave Blank If You Do Not Want): The margin was drawn around the clinically apparent lesion.  An elliptical shape was then drawn on the skin incorporating the lesion and margins.  Incisions were then made along these lines to the appropriate tissue plane and the lesion was extirpated.
Saucerization Excision Additional Text (Leave Blank If You Do Not Want): The margin was drawn around the clinically apparent lesion.  Incisions were then made along these lines, in a tangential fashion, to the appropriate tissue plane and the lesion was extirpated.
Slit Excision Additional Text (Leave Blank If You Do Not Want): A linear line was drawn on the skin overlying the lesion. An incision was made slowly until the lesion was visualized.  Once visualized, the lesion was removed with blunt dissection.
Excisional Biopsy Additional Text (Leave Blank If You Do Not Want): The margin was drawn around the clinically apparent lesion. An elliptical shape was then drawn on the skin incorporating the lesion and margins.  Incisions were then made along these lines to the appropriate tissue plane and the lesion was extirpated.
Perilesional Excision Additional Text (Leave Blank If You Do Not Want): The margin was drawn around the clinically apparent lesion. Incisions were then made along these lines to the appropriate tissue plane and the lesion was extirpated.
Repair Performed By Another Provider Text (Leave Blank If You Do Not Want): After the tissue was excised the defect was repaired by another provider.
No Repair - Repaired With Adjacent Surgical Defect Text (Leave Blank If You Do Not Want): After the excision the defect was repaired concurrently with another surgical defect which was in close approximation.
Adjacent Tissue Transfer Text: The defect edges were debeveled with a #15 scalpel blade.  Given the location of the defect and the proximity to free margins an adjacent tissue transfer was deemed most appropriate.  Using a sterile surgical marker, an appropriate flap was drawn incorporating the defect and placing the expected incisions within the relaxed skin tension lines where possible.    The area thus outlined was incised deep to adipose tissue with a #15 scalpel blade.  The skin margins were undermined to an appropriate distance in all directions utilizing iris scissors.
Advancement Flap (Single) Text: The defect edges were debeveled with a #15 scalpel blade.  Given the location of the defect and the proximity to free margins a single advancement flap was deemed most appropriate.  Using a sterile surgical marker, an appropriate advancement flap was drawn incorporating the defect and placing the expected incisions within the relaxed skin tension lines where possible.    The area thus outlined was incised deep to adipose tissue with a #15 scalpel blade.  The skin margins were undermined to an appropriate distance in all directions utilizing iris scissors.
Advancement Flap (Double) Text: The defect edges were debeveled with a #15 scalpel blade.  Given the location of the defect and the proximity to free margins a double advancement flap was deemed most appropriate.  Using a sterile surgical marker, the appropriate advancement flaps were drawn incorporating the defect and placing the expected incisions within the relaxed skin tension lines where possible.    The area thus outlined was incised deep to adipose tissue with a #15 scalpel blade.  The skin margins were undermined to an appropriate distance in all directions utilizing iris scissors.
Burow's Advancement Flap Text: The defect edges were debeveled with a #15 scalpel blade.  Given the location of the defect and the proximity to free margins a Burow's advancement flap was deemed most appropriate.  Using a sterile surgical marker, the appropriate advancement flap was drawn incorporating the defect and placing the expected incisions within the relaxed skin tension lines where possible.    The area thus outlined was incised deep to adipose tissue with a #15 scalpel blade.  The skin margins were undermined to an appropriate distance in all directions utilizing iris scissors.
Chonodrocutaneous Helical Advancement Flap Text: The defect edges were debeveled with a #15 scalpel blade.  Given the location of the defect and the proximity to free margins a chondrocutaneous helical advancement flap was deemed most appropriate.  Using a sterile surgical marker, the appropriate advancement flap was drawn incorporating the defect and placing the expected incisions within the relaxed skin tension lines where possible.    The area thus outlined was incised deep to adipose tissue with a #15 scalpel blade.  The skin margins were undermined to an appropriate distance in all directions utilizing iris scissors.
Crescentic Advancement Flap Text: The defect edges were debeveled with a #15 scalpel blade.  Given the location of the defect and the proximity to free margins a crescentic advancement flap was deemed most appropriate.  Using a sterile surgical marker, the appropriate advancement flap was drawn incorporating the defect and placing the expected incisions within the relaxed skin tension lines where possible.    The area thus outlined was incised deep to adipose tissue with a #15 scalpel blade.  The skin margins were undermined to an appropriate distance in all directions utilizing iris scissors.
A-T Advancement Flap Text: The defect edges were debeveled with a #15 scalpel blade.  Given the location of the defect, shape of the defect and the proximity to free margins an A-T advancement flap was deemed most appropriate.  Using a sterile surgical marker, an appropriate advancement flap was drawn incorporating the defect and placing the expected incisions within the relaxed skin tension lines where possible.    The area thus outlined was incised deep to adipose tissue with a #15 scalpel blade.  The skin margins were undermined to an appropriate distance in all directions utilizing iris scissors.
O-T Advancement Flap Text: The defect edges were debeveled with a #15 scalpel blade.  Given the location of the defect, shape of the defect and the proximity to free margins an O-T advancement flap was deemed most appropriate.  Using a sterile surgical marker, an appropriate advancement flap was drawn incorporating the defect and placing the expected incisions within the relaxed skin tension lines where possible.    The area thus outlined was incised deep to adipose tissue with a #15 scalpel blade.  The skin margins were undermined to an appropriate distance in all directions utilizing iris scissors.
O-L Flap Text: The defect edges were debeveled with a #15 scalpel blade.  Given the location of the defect, shape of the defect and the proximity to free margins an O-L flap was deemed most appropriate.  Using a sterile surgical marker, an appropriate advancement flap was drawn incorporating the defect and placing the expected incisions within the relaxed skin tension lines where possible.    The area thus outlined was incised deep to adipose tissue with a #15 scalpel blade.  The skin margins were undermined to an appropriate distance in all directions utilizing iris scissors.
O-Z Flap Text: The defect edges were debeveled with a #15 scalpel blade.  Given the location of the defect, shape of the defect and the proximity to free margins an O-Z flap was deemed most appropriate.  Using a sterile surgical marker, an appropriate transposition flap was drawn incorporating the defect and placing the expected incisions within the relaxed skin tension lines where possible. The area thus outlined was incised deep to adipose tissue with a #15 scalpel blade.  The skin margins were undermined to an appropriate distance in all directions utilizing iris scissors.
Double O-Z Flap Text: The defect edges were debeveled with a #15 scalpel blade.  Given the location of the defect, shape of the defect and the proximity to free margins a Double O-Z flap was deemed most appropriate.  Using a sterile surgical marker, an appropriate transposition flap was drawn incorporating the defect and placing the expected incisions within the relaxed skin tension lines where possible. The area thus outlined was incised deep to adipose tissue with a #15 scalpel blade.  The skin margins were undermined to an appropriate distance in all directions utilizing iris scissors.
V-Y Flap Text: The defect edges were debeveled with a #15 scalpel blade.  Given the location of the defect, shape of the defect and the proximity to free margins a V-Y flap was deemed most appropriate.  Using a sterile surgical marker, an appropriate advancement flap was drawn incorporating the defect and placing the expected incisions within the relaxed skin tension lines where possible.    The area thus outlined was incised deep to adipose tissue with a #15 scalpel blade.  The skin margins were undermined to an appropriate distance in all directions utilizing iris scissors.
Advancement-Rotation Flap Text: The defect edges were debeveled with a #15 scalpel blade.  Given the location of the defect, shape of the defect and the proximity to free margins an advancement-rotation flap was deemed most appropriate.  Using a sterile surgical marker, an appropriate flap was drawn incorporating the defect and placing the expected incisions within the relaxed skin tension lines where possible. The area thus outlined was incised deep to adipose tissue with a #15 scalpel blade.  The skin margins were undermined to an appropriate distance in all directions utilizing iris scissors.
Mercedes Flap Text: The defect edges were debeveled with a #15 scalpel blade.  Given the location of the defect, shape of the defect and the proximity to free margins a Mercedes flap was deemed most appropriate.  Using a sterile surgical marker, an appropriate advancement flap was drawn incorporating the defect and placing the expected incisions within the relaxed skin tension lines where possible. The area thus outlined was incised deep to adipose tissue with a #15 scalpel blade.  The skin margins were undermined to an appropriate distance in all directions utilizing iris scissors.
Modified Advancement Flap Text: The defect edges were debeveled with a #15 scalpel blade.  Given the location of the defect, shape of the defect and the proximity to free margins a modified advancement flap was deemed most appropriate.  Using a sterile surgical marker, an appropriate advancement flap was drawn incorporating the defect and placing the expected incisions within the relaxed skin tension lines where possible.    The area thus outlined was incised deep to adipose tissue with a #15 scalpel blade.  The skin margins were undermined to an appropriate distance in all directions utilizing iris scissors.
Mucosal Advancement Flap Text: Given the location of the defect, shape of the defect and the proximity to free margins a mucosal advancement flap was deemed most appropriate. Incisions were made with a 15 blade scalpel in the appropriate fashion along the cutaneous vermillion border and the mucosal lip. The remaining actinically damaged mucosal tissue was excised.  The mucosal advancement flap was then elevated to the gingival sulcus with care taken to preserve the neurovascular structures and advanced into the primary defect. Care was taken to ensure that precise realignment of the vermilion border was achieved.
Peng Advancement Flap Text: The defect edges were debeveled with a #15 scalpel blade.  Given the location of the defect, shape of the defect and the proximity to free margins a Peng advancement flap was deemed most appropriate.  Using a sterile surgical marker, an appropriate advancement flap was drawn incorporating the defect and placing the expected incisions within the relaxed skin tension lines where possible. The area thus outlined was incised deep to adipose tissue with a #15 scalpel blade.  The skin margins were undermined to an appropriate distance in all directions utilizing iris scissors.
Hatchet Flap Text: The defect edges were debeveled with a #15 scalpel blade.  Given the location of the defect, shape of the defect and the proximity to free margins a hatchet flap was deemed most appropriate.  Using a sterile surgical marker, an appropriate hatchet flap was drawn incorporating the defect and placing the expected incisions within the relaxed skin tension lines where possible.    The area thus outlined was incised deep to adipose tissue with a #15 scalpel blade.  The skin margins were undermined to an appropriate distance in all directions utilizing iris scissors.
Rotation Flap Text: The defect edges were debeveled with a #15 scalpel blade.  Given the location of the defect, shape of the defect and the proximity to free margins a rotation flap was deemed most appropriate.  Using a sterile surgical marker, an appropriate rotation flap was drawn incorporating the defect and placing the expected incisions within the relaxed skin tension lines where possible.    The area thus outlined was incised deep to adipose tissue with a #15 scalpel blade.  The skin margins were undermined to an appropriate distance in all directions utilizing iris scissors.
Spiral Flap Text: The defect edges were debeveled with a #15 scalpel blade.  Given the location of the defect, shape of the defect and the proximity to free margins a spiral flap was deemed most appropriate.  Using a sterile surgical marker, an appropriate rotation flap was drawn incorporating the defect and placing the expected incisions within the relaxed skin tension lines where possible. The area thus outlined was incised deep to adipose tissue with a #15 scalpel blade.  The skin margins were undermined to an appropriate distance in all directions utilizing iris scissors.
Staged Advancement Flap Text: The defect edges were debeveled with a #15 scalpel blade.  Given the location of the defect, shape of the defect and the proximity to free margins a staged advancement flap was deemed most appropriate.  Using a sterile surgical marker, an appropriate advancement flap was drawn incorporating the defect and placing the expected incisions within the relaxed skin tension lines where possible. The area thus outlined was incised deep to adipose tissue with a #15 scalpel blade.  The skin margins were undermined to an appropriate distance in all directions utilizing iris scissors.
Star Wedge Flap Text: The defect edges were debeveled with a #15 scalpel blade.  Given the location of the defect, shape of the defect and the proximity to free margins a star wedge flap was deemed most appropriate.  Using a sterile surgical marker, an appropriate rotation flap was drawn incorporating the defect and placing the expected incisions within the relaxed skin tension lines where possible. The area thus outlined was incised deep to adipose tissue with a #15 scalpel blade.  The skin margins were undermined to an appropriate distance in all directions utilizing iris scissors.
Transposition Flap Text: The defect edges were debeveled with a #15 scalpel blade.  Given the location of the defect and the proximity to free margins a transposition flap was deemed most appropriate.  Using a sterile surgical marker, an appropriate transposition flap was drawn incorporating the defect.    The area thus outlined was incised deep to adipose tissue with a #15 scalpel blade.  The skin margins were undermined to an appropriate distance in all directions utilizing iris scissors.
Muscle Hinge Flap Text: The defect edges were debeveled with a #15 scalpel blade.  Given the size, depth and location of the defect and the proximity to free margins a muscle hinge flap was deemed most appropriate.  Using a sterile surgical marker, an appropriate hinge flap was drawn incorporating the defect. The area thus outlined was incised with a #15 scalpel blade.  The skin margins were undermined to an appropriate distance in all directions utilizing iris scissors.
Mustarde Flap Text: The defect edges were debeveled with a #15 scalpel blade.  Given the size, depth and location of the defect and the proximity to free margins a Mustarde flap was deemed most appropriate.  Using a sterile surgical marker, an appropriate flap was drawn incorporating the defect. The area thus outlined was incised with a #15 scalpel blade.  The skin margins were undermined to an appropriate distance in all directions utilizing iris scissors.
Nasal Turnover Hinge Flap Text: The defect edges were debeveled with a #15 scalpel blade.  Given the size, depth, location of the defect and the defect being full thickness a nasal turnover hinge flap was deemed most appropriate.  Using a sterile surgical marker, an appropriate hinge flap was drawn incorporating the defect. The area thus outlined was incised with a #15 scalpel blade. The flap was designed to recreate the nasal mucosal lining and the alar rim. The skin margins were undermined to an appropriate distance in all directions utilizing iris scissors.
Nasalis-Muscle-Based Myocutaneous Island Pedicle Flap Text: Using a #15 blade, an incision was made around the donor flap to the level of the nasalis muscle. Wide lateral undermining was then performed in both the subcutaneous plane above the nasalis muscle, and in a submuscular plane just above periosteum. This allowed the formation of a free nasalis muscle axial pedicle (based on the angular artery) which was still attached to the actual cutaneous flap, increasing its mobility and vascular viability. Hemostasis was obtained with pinpoint electrocoagulation. The flap was mobilized into position and the pivotal anchor points positioned and stabilized with buried interrupted sutures. Subcutaneous and dermal tissues were closed in a multilayered fashion with sutures. Tissue redundancies were excised, and the epidermal edges were apposed without significant tension and sutured with sutures.
Orbicularis Oris Muscle Flap Text: The defect edges were debeveled with a #15 scalpel blade.  Given that the defect affected the competency of the oral sphincter an obicularis oris muscle flap was deemed most appropriate to restore this competency and normal muscle function.  Using a sterile surgical marker, an appropriate flap was drawn incorporating the defect. The area thus outlined was incised with a #15 scalpel blade.
Melolabial Transposition Flap Text: The defect edges were debeveled with a #15 scalpel blade.  Given the location of the defect and the proximity to free margins a melolabial flap was deemed most appropriate.  Using a sterile surgical marker, an appropriate melolabial transposition flap was drawn incorporating the defect.    The area thus outlined was incised deep to adipose tissue with a #15 scalpel blade.  The skin margins were undermined to an appropriate distance in all directions utilizing iris scissors.
Rhombic Flap Text: The defect edges were debeveled with a #15 scalpel blade.  Given the location of the defect and the proximity to free margins a rhombic flap was deemed most appropriate.  Using a sterile surgical marker, an appropriate rhombic flap was drawn incorporating the defect.    The area thus outlined was incised deep to adipose tissue with a #15 scalpel blade.  The skin margins were undermined to an appropriate distance in all directions utilizing iris scissors.
Rhomboid Transposition Flap Text: The defect edges were debeveled with a #15 scalpel blade.  Given the location of the defect and the proximity to free margins a rhomboid transposition flap was deemed most appropriate.  Using a sterile surgical marker, an appropriate rhomboid flap was drawn incorporating the defect.    The area thus outlined was incised deep to adipose tissue with a #15 scalpel blade.  The skin margins were undermined to an appropriate distance in all directions utilizing iris scissors.
Bi-Rhombic Flap Text: The defect edges were debeveled with a #15 scalpel blade.  Given the location of the defect and the proximity to free margins a bi-rhombic flap was deemed most appropriate.  Using a sterile surgical marker, an appropriate rhombic flap was drawn incorporating the defect. The area thus outlined was incised deep to adipose tissue with a #15 scalpel blade.  The skin margins were undermined to an appropriate distance in all directions utilizing iris scissors.
Helical Rim Advancement Flap Text: The defect edges were debeveled with a #15 blade scalpel.  Given the location of the defect and the proximity to free margins (helical rim) a double helical rim advancement flap was deemed most appropriate.  Using a sterile surgical marker, the appropriate advancement flaps were drawn incorporating the defect and placing the expected incisions between the helical rim and antihelix where possible.  The area thus outlined was incised through and through with a #15 scalpel blade.  With a skin hook and iris scissors, the flaps were gently and sharply undermined and freed up.
Bilateral Helical Rim Advancement Flap Text: The defect edges were debeveled with a #15 blade scalpel.  Given the location of the defect and the proximity to free margins (helical rim) a bilateral helical rim advancement flap was deemed most appropriate.  Using a sterile surgical marker, the appropriate advancement flaps were drawn incorporating the defect and placing the expected incisions between the helical rim and antihelix where possible.  The area thus outlined was incised through and through with a #15 scalpel blade.  With a skin hook and iris scissors, the flaps were gently and sharply undermined and freed up.
Ear Star Wedge Flap Text: The defect edges were debeveled with a #15 blade scalpel.  Given the location of the defect and the proximity to free margins (helical rim) an ear star wedge flap was deemed most appropriate.  Using a sterile surgical marker, the appropriate flap was drawn incorporating the defect and placing the expected incisions between the helical rim and antihelix where possible.  The area thus outlined was incised through and through with a #15 scalpel blade.
Banner Transposition Flap Text: The defect edges were debeveled with a #15 scalpel blade.  Given the location of the defect and the proximity to free margins a Banner transposition flap was deemed most appropriate.  Using a sterile surgical marker, an appropriate flap drawn around the defect. The area thus outlined was incised deep to adipose tissue with a #15 scalpel blade.  The skin margins were undermined to an appropriate distance in all directions utilizing iris scissors.
Bilobed Flap Text: The defect edges were debeveled with a #15 scalpel blade.  Given the location of the defect and the proximity to free margins a bilobe flap was deemed most appropriate.  Using a sterile surgical marker, an appropriate bilobe flap drawn around the defect.    The area thus outlined was incised deep to adipose tissue with a #15 scalpel blade.  The skin margins were undermined to an appropriate distance in all directions utilizing iris scissors.
Bilobed Transposition Flap Text: The defect edges were debeveled with a #15 scalpel blade.  Given the location of the defect and the proximity to free margins a bilobed transposition flap was deemed most appropriate.  Using a sterile surgical marker, an appropriate bilobe flap drawn around the defect.    The area thus outlined was incised deep to adipose tissue with a #15 scalpel blade.  The skin margins were undermined to an appropriate distance in all directions utilizing iris scissors.
Trilobed Flap Text: The defect edges were debeveled with a #15 scalpel blade.  Given the location of the defect and the proximity to free margins a trilobed flap was deemed most appropriate.  Using a sterile surgical marker, an appropriate trilobed flap drawn around the defect.    The area thus outlined was incised deep to adipose tissue with a #15 scalpel blade.  The skin margins were undermined to an appropriate distance in all directions utilizing iris scissors.
Dorsal Nasal Flap Text: The defect edges were debeveled with a #15 scalpel blade.  Given the location of the defect and the proximity to free margins a dorsal nasal flap was deemed most appropriate.  Using a sterile surgical marker, an appropriate dorsal nasal flap was drawn around the defect.    The area thus outlined was incised deep to adipose tissue with a #15 scalpel blade.  The skin margins were undermined to an appropriate distance in all directions utilizing iris scissors.
Island Pedicle Flap Text: The defect edges were debeveled with a #15 scalpel blade.  Given the location of the defect, shape of the defect and the proximity to free margins an island pedicle advancement flap was deemed most appropriate.  Using a sterile surgical marker, an appropriate advancement flap was drawn incorporating the defect, outlining the appropriate donor tissue and placing the expected incisions within the relaxed skin tension lines where possible.    The area thus outlined was incised deep to adipose tissue with a #15 scalpel blade.  The skin margins were undermined to an appropriate distance in all directions around the primary defect and laterally outward around the island pedicle utilizing iris scissors.  There was minimal undermining beneath the pedicle flap.
Island Pedicle Flap With Canthal Suspension Text: The defect edges were debeveled with a #15 scalpel blade.  Given the location of the defect, shape of the defect and the proximity to free margins an island pedicle advancement flap was deemed most appropriate.  Using a sterile surgical marker, an appropriate advancement flap was drawn incorporating the defect, outlining the appropriate donor tissue and placing the expected incisions within the relaxed skin tension lines where possible. The area thus outlined was incised deep to adipose tissue with a #15 scalpel blade.  The skin margins were undermined to an appropriate distance in all directions around the primary defect and laterally outward around the island pedicle utilizing iris scissors.  There was minimal undermining beneath the pedicle flap. A suspension suture was placed in the canthal tendon to prevent tension and prevent ectropion.
Alar Island Pedicle Flap Text: The defect edges were debeveled with a #15 scalpel blade.  Given the location of the defect, shape of the defect and the proximity to the alar rim an island pedicle advancement flap was deemed most appropriate.  Using a sterile surgical marker, an appropriate advancement flap was drawn incorporating the defect, outlining the appropriate donor tissue and placing the expected incisions within the nasal ala running parallel to the alar rim. The area thus outlined was incised with a #15 scalpel blade.  The skin margins were undermined minimally to an appropriate distance in all directions around the primary defect and laterally outward around the island pedicle utilizing iris scissors.  There was minimal undermining beneath the pedicle flap.
Double Island Pedicle Flap Text: The defect edges were debeveled with a #15 scalpel blade.  Given the location of the defect, shape of the defect and the proximity to free margins a double island pedicle advancement flap was deemed most appropriate.  Using a sterile surgical marker, an appropriate advancement flap was drawn incorporating the defect, outlining the appropriate donor tissue and placing the expected incisions within the relaxed skin tension lines where possible.    The area thus outlined was incised deep to adipose tissue with a #15 scalpel blade.  The skin margins were undermined to an appropriate distance in all directions around the primary defect and laterally outward around the island pedicle utilizing iris scissors.  There was minimal undermining beneath the pedicle flap.
Island Pedicle Flap-Requiring Vessel Identification Text: The defect edges were debeveled with a #15 scalpel blade.  Given the location of the defect, shape of the defect and the proximity to free margins an island pedicle advancement flap was deemed most appropriate.  Using a sterile surgical marker, an appropriate advancement flap was drawn, based on the axial vessel mentioned above, incorporating the defect, outlining the appropriate donor tissue and placing the expected incisions within the relaxed skin tension lines where possible.    The area thus outlined was incised deep to adipose tissue with a #15 scalpel blade.  The skin margins were undermined to an appropriate distance in all directions around the primary defect and laterally outward around the island pedicle utilizing iris scissors.  There was minimal undermining beneath the pedicle flap.
Keystone Flap Text: The defect edges were debeveled with a #15 scalpel blade.  Given the location of the defect, shape of the defect a keystone flap was deemed most appropriate.  Using a sterile surgical marker, an appropriate keystone flap was drawn incorporating the defect, outlining the appropriate donor tissue and placing the expected incisions within the relaxed skin tension lines where possible. The area thus outlined was incised deep to adipose tissue with a #15 scalpel blade.  The skin margins were undermined to an appropriate distance in all directions around the primary defect and laterally outward around the flap utilizing iris scissors.
O-T Plasty Text: The defect edges were debeveled with a #15 scalpel blade.  Given the location of the defect, shape of the defect and the proximity to free margins an O-T plasty was deemed most appropriate.  Using a sterile surgical marker, an appropriate O-T plasty was drawn incorporating the defect and placing the expected incisions within the relaxed skin tension lines where possible.    The area thus outlined was incised deep to adipose tissue with a #15 scalpel blade.  The skin margins were undermined to an appropriate distance in all directions utilizing iris scissors.
O-Z Plasty Text: The defect edges were debeveled with a #15 scalpel blade.  Given the location of the defect, shape of the defect and the proximity to free margins an O-Z plasty (double transposition flap) was deemed most appropriate.  Using a sterile surgical marker, the appropriate transposition flaps were drawn incorporating the defect and placing the expected incisions within the relaxed skin tension lines where possible.    The area thus outlined was incised deep to adipose tissue with a #15 scalpel blade.  The skin margins were undermined to an appropriate distance in all directions utilizing iris scissors.  Hemostasis was achieved with electrocautery.  The flaps were then transposed into place, one clockwise and the other counterclockwise, and anchored with interrupted buried subcutaneous sutures.
Double O-Z Plasty Text: The defect edges were debeveled with a #15 scalpel blade.  Given the location of the defect, shape of the defect and the proximity to free margins a Double O-Z plasty (double transposition flap) was deemed most appropriate.  Using a sterile surgical marker, the appropriate transposition flaps were drawn incorporating the defect and placing the expected incisions within the relaxed skin tension lines where possible. The area thus outlined was incised deep to adipose tissue with a #15 scalpel blade.  The skin margins were undermined to an appropriate distance in all directions utilizing iris scissors.  Hemostasis was achieved with electrocautery.  The flaps were then transposed into place, one clockwise and the other counterclockwise, and anchored with interrupted buried subcutaneous sutures.
V-Y Plasty Text: The defect edges were debeveled with a #15 scalpel blade.  Given the location of the defect, shape of the defect and the proximity to free margins an V-Y advancement flap was deemed most appropriate.  Using a sterile surgical marker, an appropriate advancement flap was drawn incorporating the defect and placing the expected incisions within the relaxed skin tension lines where possible.    The area thus outlined was incised deep to adipose tissue with a #15 scalpel blade.  The skin margins were undermined to an appropriate distance in all directions utilizing iris scissors.
H Plasty Text: Given the location of the defect, shape of the defect and the proximity to free margins a H-plasty was deemed most appropriate for repair.  Using a sterile surgical marker, the appropriate advancement arms of the H-plasty were drawn incorporating the defect and placing the expected incisions within the relaxed skin tension lines where possible. The area thus outlined was incised deep to adipose tissue with a #15 scalpel blade. The skin margins were undermined to an appropriate distance in all directions utilizing iris scissors.  The opposing advancement arms were then advanced into place in opposite direction and anchored with interrupted buried subcutaneous sutures.
W Plasty Text: The lesion was extirpated to the level of the fat with a #15 scalpel blade.  Given the location of the defect, shape of the defect and the proximity to free margins a W-plasty was deemed most appropriate for repair.  Using a sterile surgical marker, the appropriate transposition arms of the W-plasty were drawn incorporating the defect and placing the expected incisions within the relaxed skin tension lines where possible.    The area thus outlined was incised deep to adipose tissue with a #15 scalpel blade.  The skin margins were undermined to an appropriate distance in all directions utilizing iris scissors.  The opposing transposition arms were then transposed into place in opposite direction and anchored with interrupted buried subcutaneous sutures.
Z Plasty Text: The lesion was extirpated to the level of the fat with a #15 scalpel blade.  Given the location of the defect, shape of the defect and the proximity to free margins a Z-plasty was deemed most appropriate for repair.  Using a sterile surgical marker, the appropriate transposition arms of the Z-plasty were drawn incorporating the defect and placing the expected incisions within the relaxed skin tension lines where possible.    The area thus outlined was incised deep to adipose tissue with a #15 scalpel blade.  The skin margins were undermined to an appropriate distance in all directions utilizing iris scissors.  The opposing transposition arms were then transposed into place in opposite direction and anchored with interrupted buried subcutaneous sutures.
Zygomaticofacial Flap Text: Given the location of the defect, shape of the defect and the proximity to free margins a zygomaticofacial flap was deemed most appropriate for repair.  Using a sterile surgical marker, the appropriate flap was drawn incorporating the defect and placing the expected incisions within the relaxed skin tension lines where possible. The area thus outlined was incised deep to adipose tissue with a #15 scalpel blade with preservation of a vascular pedicle.  The skin margins were undermined to an appropriate distance in all directions utilizing iris scissors.  The flap was then placed into the defect and anchored with interrupted buried subcutaneous sutures.
Cheek Interpolation Flap Text: A decision was made to reconstruct the defect utilizing an interpolation axial flap and a staged reconstruction.  A telfa template was made of the defect.  This telfa template was then used to outline the Cheek Interpolation flap.  The donor area for the pedicle flap was then injected with anesthesia.  The flap was excised through the skin and subcutaneous tissue down to the layer of the underlying musculature.  The interpolation flap was carefully excised within this deep plane to maintain its blood supply.  The edges of the donor site were undermined.   The donor site was closed in a primary fashion.  The pedicle was then rotated into position and sutured.  Once the tube was sutured into place, adequate blood supply was confirmed with blanching and refill.  The pedicle was then wrapped with xeroform gauze and dressed appropriately with a telfa and gauze bandage to ensure continued blood supply and protect the attached pedicle.
Cheek-To-Nose Interpolation Flap Text: A decision was made to reconstruct the defect utilizing an interpolation axial flap and a staged reconstruction.  A telfa template was made of the defect.  This telfa template was then used to outline the Cheek-To-Nose Interpolation flap.  The donor area for the pedicle flap was then injected with anesthesia.  The flap was excised through the skin and subcutaneous tissue down to the layer of the underlying musculature.  The interpolation flap was carefully excised within this deep plane to maintain its blood supply.  The edges of the donor site were undermined.   The donor site was closed in a primary fashion.  The pedicle was then rotated into position and sutured.  Once the tube was sutured into place, adequate blood supply was confirmed with blanching and refill.  The pedicle was then wrapped with xeroform gauze and dressed appropriately with a telfa and gauze bandage to ensure continued blood supply and protect the attached pedicle.
Interpolation Flap Text: A decision was made to reconstruct the defect utilizing an interpolation axial flap and a staged reconstruction.  A telfa template was made of the defect.  This telfa template was then used to outline the interpolation flap.  The donor area for the pedicle flap was then injected with anesthesia.  The flap was excised through the skin and subcutaneous tissue down to the layer of the underlying musculature.  The interpolation flap was carefully excised within this deep plane to maintain its blood supply.  The edges of the donor site were undermined.   The donor site was closed in a primary fashion.  The pedicle was then rotated into position and sutured.  Once the tube was sutured into place, adequate blood supply was confirmed with blanching and refill.  The pedicle was then wrapped with xeroform gauze and dressed appropriately with a telfa and gauze bandage to ensure continued blood supply and protect the attached pedicle.
Melolabial Interpolation Flap Text: A decision was made to reconstruct the defect utilizing an interpolation axial flap and a staged reconstruction.  A telfa template was made of the defect.  This telfa template was then used to outline the melolabial interpolation flap.  The donor area for the pedicle flap was then injected with anesthesia.  The flap was excised through the skin and subcutaneous tissue down to the layer of the underlying musculature.  The pedicle flap was carefully excised within this deep plane to maintain its blood supply.  The edges of the donor site were undermined.   The donor site was closed in a primary fashion.  The pedicle was then rotated into position and sutured.  Once the tube was sutured into place, adequate blood supply was confirmed with blanching and refill.  The pedicle was then wrapped with xeroform gauze and dressed appropriately with a telfa and gauze bandage to ensure continued blood supply and protect the attached pedicle.
Mastoid Interpolation Flap Text: A decision was made to reconstruct the defect utilizing an interpolation axial flap and a staged reconstruction.  A telfa template was made of the defect.  This telfa template was then used to outline the mastoid interpolation flap.  The donor area for the pedicle flap was then injected with anesthesia.  The flap was excised through the skin and subcutaneous tissue down to the layer of the underlying musculature.  The pedicle flap was carefully excised within this deep plane to maintain its blood supply.  The edges of the donor site were undermined.   The donor site was closed in a primary fashion.  The pedicle was then rotated into position and sutured.  Once the tube was sutured into place, adequate blood supply was confirmed with blanching and refill.  The pedicle was then wrapped with xeroform gauze and dressed appropriately with a telfa and gauze bandage to ensure continued blood supply and protect the attached pedicle.
Posterior Auricular Interpolation Flap Text: A decision was made to reconstruct the defect utilizing an interpolation axial flap and a staged reconstruction.  A telfa template was made of the defect.  This telfa template was then used to outline the posterior auricular interpolation flap.  The donor area for the pedicle flap was then injected with anesthesia.  The flap was excised through the skin and subcutaneous tissue down to the layer of the underlying musculature.  The pedicle flap was carefully excised within this deep plane to maintain its blood supply.  The edges of the donor site were undermined.   The donor site was closed in a primary fashion.  The pedicle was then rotated into position and sutured.  Once the tube was sutured into place, adequate blood supply was confirmed with blanching and refill.  The pedicle was then wrapped with xeroform gauze and dressed appropriately with a telfa and gauze bandage to ensure continued blood supply and protect the attached pedicle.
Paramedian Forehead Flap Text: A decision was made to reconstruct the defect utilizing an interpolation axial flap and a staged reconstruction.  A telfa template was made of the defect.  This telfa template was then used to outline the paramedian forehead pedicle flap.  The donor area for the pedicle flap was then injected with anesthesia.  The flap was excised through the skin and subcutaneous tissue down to the layer of the underlying musculature.  The pedicle flap was carefully excised within this deep plane to maintain its blood supply.  The edges of the donor site were undermined.   The donor site was closed in a primary fashion.  The pedicle was then rotated into position and sutured.  Once the tube was sutured into place, adequate blood supply was confirmed with blanching and refill.  The pedicle was then wrapped with xeroform gauze and dressed appropriately with a telfa and gauze bandage to ensure continued blood supply and protect the attached pedicle.
Abbe Flap (Upper To Lower Lip) Text: The defect of the lower lip was assessed and measured.  Given the location and size of the defect, an Abbe flap was deemed most appropriate.  Using a sterile surgical marker, an appropriate Abbe flap was measured and drawn on the upper lip. Local anesthesia was then infiltrated.  A scalpel was then used to incise the upper lip through and through the skin, vermilion, muscle and mucosa, leaving the flap pedicled on the opposite side.  The flap was then rotated and transferred to the lower lip defect.  The flap was then sutured into place with a three layer technique, closing the orbicularis oris muscle layer with subcutaneous buried sutures, followed by a mucosal layer and an epidermal layer.
Abbe Flap (Lower To Upper Lip) Text: The defect of the upper lip was assessed and measured.  Given the location and size of the defect, an Abbe flap was deemed most appropriate.  Using a sterile surgical marker, an appropriate Abbe flap was measured and drawn on the lower lip. Local anesthesia was then infiltrated. A scalpel was then used to incise the upper lip through and through the skin, vermilion, muscle and mucosa, leaving the flap pedicled on the opposite side.  The flap was then rotated and transferred to the lower lip defect.  The flap was then sutured into place with a three layer technique, closing the orbicularis oris muscle layer with subcutaneous buried sutures, followed by a mucosal layer and an epidermal layer.
Estlander Flap (Upper To Lower Lip) Text: The defect of the lower lip was assessed and measured.  Given the location and size of the defect, an Estlander flap was deemed most appropriate.  Using a sterile surgical marker, an appropriate Estlander flap was measured and drawn on the upper lip. Local anesthesia was then infiltrated. A scalpel was then used to incise the lateral aspect of the flap, through skin, muscle and mucosa, leaving the flap pedicled medially.  The flap was then rotated and positioned to fill the lower lip defect.  The flap was then sutured into place with a three layer technique, closing the orbicularis oris muscle layer with subcutaneous buried sutures, followed by a mucosal layer and an epidermal layer.
Lip Wedge Excision Repair Text: Given the location of the defect and the proximity to free margins a full thickness wedge repair was deemed most appropriate.  Using a sterile surgical marker, the appropriate repair was drawn incorporating the defect and placing the expected incisions perpendicular to the vermilion border.  The vermilion border was also meticulously outlined to ensure appropriate reapproximation during the repair.  The area thus outlined was incised through and through with a #15 scalpel blade.  The muscularis and dermis were reaproximated with deep sutures following hemostasis. Care was taken to realign the vermilion border before proceeding with the superficial closure.  Once the vermilion was realigned the superfical and mucosal closure was finished.
Ftsg Text: The defect edges were debeveled with a #15 scalpel blade.  Given the location of the defect, shape of the defect and the proximity to free margins a full thickness skin graft was deemed most appropriate.  Using a sterile surgical marker, the primary defect shape was transferred to the donor site. The area thus outlined was incised deep to adipose tissue with a #15 scalpel blade.  The harvested graft was then trimmed of adipose tissue until only dermis and epidermis was left.  The skin margins of the secondary defect were undermined to an appropriate distance in all directions utilizing iris scissors.  The secondary defect was closed with interrupted buried subcutaneous sutures.  The skin edges were then re-apposed with running  sutures.  The skin graft was then placed in the primary defect and oriented appropriately.
Split-Thickness Skin Graft Text: The defect edges were debeveled with a #15 scalpel blade.  Given the location of the defect, shape of the defect and the proximity to free margins a split thickness skin graft was deemed most appropriate.  Using a sterile surgical marker, the primary defect shape was transferred to the donor site. The split thickness graft was then harvested.  The skin graft was then placed in the primary defect and oriented appropriately.
Burow's Graft Text: The defect edges were debeveled with a #15 scalpel blade.  Given the location of the defect, shape of the defect, the proximity to free margins and the presence of a standing cone deformity a Burow's skin graft was deemed most appropriate. The standing cone was removed and this tissue was then trimmed to the shape of the primary defect. The adipose tissue was also removed until only dermis and epidermis were left.  The skin margins of the secondary defect were undermined to an appropriate distance in all directions utilizing iris scissors.  The secondary defect was closed with interrupted buried subcutaneous sutures.  The skin edges were then re-apposed with running  sutures.  The skin graft was then placed in the primary defect and oriented appropriately.
Cartilage Graft Text: The defect edges were debeveled with a #15 scalpel blade.  Given the location of the defect, shape of the defect, the fact the defect involved a full thickness cartilage defect a cartilage graft was deemed most appropriate.  An appropriate donor site was identified, cleansed, and anesthetized. The cartilage graft was then harvested and transferred to the recipient site, oriented appropriately and then sutured into place.  The secondary defect was then repaired using a primary closure.
Composite Graft Text: The defect edges were debeveled with a #15 scalpel blade.  Given the location of the defect, shape of the defect, the proximity to free margins and the fact the defect was full thickness a composite graft was deemed most appropriate.  The defect was outline and then transferred to the donor site.  A full thickness graft was then excised from the donor site. The graft was then placed in the primary defect, oriented appropriately and then sutured into place.  The secondary defect was then repaired using a primary closure.
Epidermal Autograft Text: The defect edges were debeveled with a #15 scalpel blade.  Given the location of the defect, shape of the defect and the proximity to free margins an epidermal autograft was deemed most appropriate.  Using a sterile surgical marker, the primary defect shape was transferred to the donor site. The epidermal graft was then harvested.  The skin graft was then placed in the primary defect and oriented appropriately.
Dermal Autograft Text: The defect edges were debeveled with a #15 scalpel blade.  Given the location of the defect, shape of the defect and the proximity to free margins a dermal autograft was deemed most appropriate.  Using a sterile surgical marker, the primary defect shape was transferred to the donor site. The area thus outlined was incised deep to adipose tissue with a #15 scalpel blade.  The harvested graft was then trimmed of adipose and epidermal tissue until only dermis was left.  The skin graft was then placed in the primary defect and oriented appropriately.
Skin Substitute Text: The defect edges were debeveled with a #15 scalpel blade.  Given the location of the defect, shape of the defect and the proximity to free margins a skin substitute graft was deemed most appropriate.  The graft material was trimmed to fit the size of the defect. The graft was then placed in the primary defect and oriented appropriately.
Tissue Cultured Epidermal Autograft Text: The defect edges were debeveled with a #15 scalpel blade.  Given the location of the defect, shape of the defect and the proximity to free margins a tissue cultured epidermal autograft was deemed most appropriate.  The graft was then trimmed to fit the size of the defect.  The graft was then placed in the primary defect and oriented appropriately.
Xenograft Text: The defect edges were debeveled with a #15 scalpel blade.  Given the location of the defect, shape of the defect and the proximity to free margins a xenograft was deemed most appropriate.  The graft was then trimmed to fit the size of the defect.  The graft was then placed in the primary defect and oriented appropriately.
Purse String (Intermediate) Text: Given the location of the defect and the characteristics of the surrounding skin a purse string intermediate closure was deemed most appropriate.  Undermining was performed circumferentially around the surgical defect.  A purse string suture was then placed and tightened.
Purse String (Simple) Text: Given the location of the defect and the characteristics of the surrounding skin a purse string simple closure was deemed most appropriate.  Undermining was performed circumferentially around the surgical defect.  A purse string suture was then placed and tightened.
Complex Repair And Single Advancement Flap Text: The defect edges were debeveled with a #15 scalpel blade.  The primary defect was closed partially with a complex linear closure.  Given the location of the remaining defect, shape of the defect and the proximity to free margins a single advancement flap was deemed most appropriate for complete closure of the defect.  Using a sterile surgical marker, an appropriate advancement flap was drawn incorporating the defect and placing the expected incisions within the relaxed skin tension lines where possible.    The area thus outlined was incised deep to adipose tissue with a #15 scalpel blade.  The skin margins were undermined to an appropriate distance in all directions utilizing iris scissors.
Complex Repair And Double Advancement Flap Text: The defect edges were debeveled with a #15 scalpel blade.  The primary defect was closed partially with a complex linear closure.  Given the location of the remaining defect, shape of the defect and the proximity to free margins a double advancement flap was deemed most appropriate for complete closure of the defect.  Using a sterile surgical marker, an appropriate advancement flap was drawn incorporating the defect and placing the expected incisions within the relaxed skin tension lines where possible.    The area thus outlined was incised deep to adipose tissue with a #15 scalpel blade.  The skin margins were undermined to an appropriate distance in all directions utilizing iris scissors.
Complex Repair And Modified Advancement Flap Text: The defect edges were debeveled with a #15 scalpel blade.  The primary defect was closed partially with a complex linear closure.  Given the location of the remaining defect, shape of the defect and the proximity to free margins a modified advancement flap was deemed most appropriate for complete closure of the defect.  Using a sterile surgical marker, an appropriate advancement flap was drawn incorporating the defect and placing the expected incisions within the relaxed skin tension lines where possible.    The area thus outlined was incised deep to adipose tissue with a #15 scalpel blade.  The skin margins were undermined to an appropriate distance in all directions utilizing iris scissors.
Complex Repair And A-T Advancement Flap Text: The defect edges were debeveled with a #15 scalpel blade.  The primary defect was closed partially with a complex linear closure.  Given the location of the remaining defect, shape of the defect and the proximity to free margins an A-T advancement flap was deemed most appropriate for complete closure of the defect.  Using a sterile surgical marker, an appropriate advancement flap was drawn incorporating the defect and placing the expected incisions within the relaxed skin tension lines where possible.    The area thus outlined was incised deep to adipose tissue with a #15 scalpel blade.  The skin margins were undermined to an appropriate distance in all directions utilizing iris scissors.
Complex Repair And O-T Advancement Flap Text: The defect edges were debeveled with a #15 scalpel blade.  The primary defect was closed partially with a complex linear closure.  Given the location of the remaining defect, shape of the defect and the proximity to free margins an O-T advancement flap was deemed most appropriate for complete closure of the defect.  Using a sterile surgical marker, an appropriate advancement flap was drawn incorporating the defect and placing the expected incisions within the relaxed skin tension lines where possible.    The area thus outlined was incised deep to adipose tissue with a #15 scalpel blade.  The skin margins were undermined to an appropriate distance in all directions utilizing iris scissors.
Complex Repair And O-L Flap Text: The defect edges were debeveled with a #15 scalpel blade.  The primary defect was closed partially with a complex linear closure.  Given the location of the remaining defect, shape of the defect and the proximity to free margins an O-L flap was deemed most appropriate for complete closure of the defect.  Using a sterile surgical marker, an appropriate flap was drawn incorporating the defect and placing the expected incisions within the relaxed skin tension lines where possible.    The area thus outlined was incised deep to adipose tissue with a #15 scalpel blade.  The skin margins were undermined to an appropriate distance in all directions utilizing iris scissors.
Complex Repair And Bilobe Flap Text: The defect edges were debeveled with a #15 scalpel blade.  The primary defect was closed partially with a complex linear closure.  Given the location of the remaining defect, shape of the defect and the proximity to free margins a bilobe flap was deemed most appropriate for complete closure of the defect.  Using a sterile surgical marker, an appropriate advancement flap was drawn incorporating the defect and placing the expected incisions within the relaxed skin tension lines where possible.    The area thus outlined was incised deep to adipose tissue with a #15 scalpel blade.  The skin margins were undermined to an appropriate distance in all directions utilizing iris scissors.
Complex Repair And Melolabial Flap Text: The defect edges were debeveled with a #15 scalpel blade.  The primary defect was closed partially with a complex linear closure.  Given the location of the remaining defect, shape of the defect and the proximity to free margins a melolabial flap was deemed most appropriate for complete closure of the defect.  Using a sterile surgical marker, an appropriate advancement flap was drawn incorporating the defect and placing the expected incisions within the relaxed skin tension lines where possible.    The area thus outlined was incised deep to adipose tissue with a #15 scalpel blade.  The skin margins were undermined to an appropriate distance in all directions utilizing iris scissors.
Complex Repair And Rotation Flap Text: The defect edges were debeveled with a #15 scalpel blade.  The primary defect was closed partially with a complex linear closure.  Given the location of the remaining defect, shape of the defect and the proximity to free margins a rotation flap was deemed most appropriate for complete closure of the defect.  Using a sterile surgical marker, an appropriate advancement flap was drawn incorporating the defect and placing the expected incisions within the relaxed skin tension lines where possible.    The area thus outlined was incised deep to adipose tissue with a #15 scalpel blade.  The skin margins were undermined to an appropriate distance in all directions utilizing iris scissors.
Complex Repair And Rhombic Flap Text: The defect edges were debeveled with a #15 scalpel blade.  The primary defect was closed partially with a complex linear closure.  Given the location of the remaining defect, shape of the defect and the proximity to free margins a rhombic flap was deemed most appropriate for complete closure of the defect.  Using a sterile surgical marker, an appropriate advancement flap was drawn incorporating the defect and placing the expected incisions within the relaxed skin tension lines where possible.    The area thus outlined was incised deep to adipose tissue with a #15 scalpel blade.  The skin margins were undermined to an appropriate distance in all directions utilizing iris scissors.
Complex Repair And Transposition Flap Text: The defect edges were debeveled with a #15 scalpel blade.  The primary defect was closed partially with a complex linear closure.  Given the location of the remaining defect, shape of the defect and the proximity to free margins a transposition flap was deemed most appropriate for complete closure of the defect.  Using a sterile surgical marker, an appropriate advancement flap was drawn incorporating the defect and placing the expected incisions within the relaxed skin tension lines where possible.    The area thus outlined was incised deep to adipose tissue with a #15 scalpel blade.  The skin margins were undermined to an appropriate distance in all directions utilizing iris scissors.
Complex Repair And V-Y Plasty Text: The defect edges were debeveled with a #15 scalpel blade.  The primary defect was closed partially with a complex linear closure.  Given the location of the remaining defect, shape of the defect and the proximity to free margins a V-Y plasty was deemed most appropriate for complete closure of the defect.  Using a sterile surgical marker, an appropriate advancement flap was drawn incorporating the defect and placing the expected incisions within the relaxed skin tension lines where possible.    The area thus outlined was incised deep to adipose tissue with a #15 scalpel blade.  The skin margins were undermined to an appropriate distance in all directions utilizing iris scissors.
Complex Repair And M Plasty Text: The defect edges were debeveled with a #15 scalpel blade.  The primary defect was closed partially with a complex linear closure.  Given the location of the remaining defect, shape of the defect and the proximity to free margins an M plasty was deemed most appropriate for complete closure of the defect.  Using a sterile surgical marker, an appropriate advancement flap was drawn incorporating the defect and placing the expected incisions within the relaxed skin tension lines where possible.    The area thus outlined was incised deep to adipose tissue with a #15 scalpel blade.  The skin margins were undermined to an appropriate distance in all directions utilizing iris scissors.
Complex Repair And Double M Plasty Text: The defect edges were debeveled with a #15 scalpel blade.  The primary defect was closed partially with a complex linear closure.  Given the location of the remaining defect, shape of the defect and the proximity to free margins a double M plasty was deemed most appropriate for complete closure of the defect.  Using a sterile surgical marker, an appropriate advancement flap was drawn incorporating the defect and placing the expected incisions within the relaxed skin tension lines where possible.    The area thus outlined was incised deep to adipose tissue with a #15 scalpel blade.  The skin margins were undermined to an appropriate distance in all directions utilizing iris scissors.
Complex Repair And W Plasty Text: The defect edges were debeveled with a #15 scalpel blade.  The primary defect was closed partially with a complex linear closure.  Given the location of the remaining defect, shape of the defect and the proximity to free margins a W plasty was deemed most appropriate for complete closure of the defect.  Using a sterile surgical marker, an appropriate advancement flap was drawn incorporating the defect and placing the expected incisions within the relaxed skin tension lines where possible.    The area thus outlined was incised deep to adipose tissue with a #15 scalpel blade.  The skin margins were undermined to an appropriate distance in all directions utilizing iris scissors.
Complex Repair And Z Plasty Text: The defect edges were debeveled with a #15 scalpel blade.  The primary defect was closed partially with a complex linear closure.  Given the location of the remaining defect, shape of the defect and the proximity to free margins a Z plasty was deemed most appropriate for complete closure of the defect.  Using a sterile surgical marker, an appropriate advancement flap was drawn incorporating the defect and placing the expected incisions within the relaxed skin tension lines where possible.    The area thus outlined was incised deep to adipose tissue with a #15 scalpel blade.  The skin margins were undermined to an appropriate distance in all directions utilizing iris scissors.
Complex Repair And Dorsal Nasal Flap Text: The defect edges were debeveled with a #15 scalpel blade.  The primary defect was closed partially with a complex linear closure.  Given the location of the remaining defect, shape of the defect and the proximity to free margins a dorsal nasal flap was deemed most appropriate for complete closure of the defect.  Using a sterile surgical marker, an appropriate flap was drawn incorporating the defect and placing the expected incisions within the relaxed skin tension lines where possible.    The area thus outlined was incised deep to adipose tissue with a #15 scalpel blade.  The skin margins were undermined to an appropriate distance in all directions utilizing iris scissors.
Complex Repair And Ftsg Text: The defect edges were debeveled with a #15 scalpel blade.  The primary defect was closed partially with a complex linear closure.  Given the location of the defect, shape of the defect and the proximity to free margins a full thickness skin graft was deemed most appropriate to repair the remaining defect.  The graft was trimmed to fit the size of the remaining defect.  The graft was then placed in the primary defect, oriented appropriately, and sutured into place.
Complex Repair And Burow's Graft Text: The defect edges were debeveled with a #15 scalpel blade.  The primary defect was closed partially with a complex linear closure.  Given the location of the defect, shape of the defect, the proximity to free margins and the presence of a standing cone deformity a Burow's graft was deemed most appropriate to repair the remaining defect.  The graft was trimmed to fit the size of the remaining defect.  The graft was then placed in the primary defect, oriented appropriately, and sutured into place.
Complex Repair And Split-Thickness Skin Graft Text: The defect edges were debeveled with a #15 scalpel blade.  The primary defect was closed partially with a complex linear closure.  Given the location of the defect, shape of the defect and the proximity to free margins a split thickness skin graft was deemed most appropriate to repair the remaining defect.  The graft was trimmed to fit the size of the remaining defect.  The graft was then placed in the primary defect, oriented appropriately, and sutured into place.
Complex Repair And Epidermal Autograft Text: The defect edges were debeveled with a #15 scalpel blade.  The primary defect was closed partially with a complex linear closure.  Given the location of the defect, shape of the defect and the proximity to free margins an epidermal autograft was deemed most appropriate to repair the remaining defect.  The graft was trimmed to fit the size of the remaining defect.  The graft was then placed in the primary defect, oriented appropriately, and sutured into place.
Complex Repair And Dermal Autograft Text: The defect edges were debeveled with a #15 scalpel blade.  The primary defect was closed partially with a complex linear closure.  Given the location of the defect, shape of the defect and the proximity to free margins an dermal autograft was deemed most appropriate to repair the remaining defect.  The graft was trimmed to fit the size of the remaining defect.  The graft was then placed in the primary defect, oriented appropriately, and sutured into place.
Complex Repair And Tissue Cultured Epidermal Autograft Text: The defect edges were debeveled with a #15 scalpel blade.  The primary defect was closed partially with a complex linear closure.  Given the location of the defect, shape of the defect and the proximity to free margins an tissue cultured epidermal autograft was deemed most appropriate to repair the remaining defect.  The graft was trimmed to fit the size of the remaining defect.  The graft was then placed in the primary defect, oriented appropriately, and sutured into place.
Complex Repair And Xenograft Text: The defect edges were debeveled with a #15 scalpel blade.  The primary defect was closed partially with a complex linear closure.  Given the location of the defect, shape of the defect and the proximity to free margins a xenograft was deemed most appropriate to repair the remaining defect.  The graft was trimmed to fit the size of the remaining defect.  The graft was then placed in the primary defect, oriented appropriately, and sutured into place.
Complex Repair And Skin Substitute Graft Text: The defect edges were debeveled with a #15 scalpel blade.  The primary defect was closed partially with a complex linear closure.  Given the location of the remaining defect, shape of the defect and the proximity to free margins a skin substitute graft was deemed most appropriate to repair the remaining defect.  The graft was trimmed to fit the size of the remaining defect.  The graft was then placed in the primary defect, oriented appropriately, and sutured into place.
Path Notes (To The Dermatopathologist): Please check margins.
Consent was obtained from the patient. The risks and benefits to therapy were discussed in detail. Specifically, the risks of infection, scarring, bleeding, prolonged wound healing, incomplete removal, allergy to anesthesia, nerve injury and recurrence were addressed. Prior to the procedure, the treatment site was clearly identified and confirmed by the patient. All components of Universal Protocol/PAUSE Rule completed.
Render Post-Care Instructions In Note?: yes
Post-Care Instructions: I reviewed with the patient in detail post-care instructions. Patient is not to engage in any heavy lifting, exercise, or swimming for the next 14 days. Should the patient develop any fevers, chills, bleeding, severe pain patient will contact the office immediately.
Home Suture Removal Text: Patient was provided a home suture removal kit and will remove their sutures at home.  If they have any questions or difficulties they will call the office.
Where Do You Want The Question To Include Opioid Counseling Located?: Case Summary Tab
Billing Type: Third-Party Bill
Information: Selecting Yes will display possible errors in your note based on the variables you have selected. This validation is only offered as a suggestion for you. PLEASE NOTE THAT THE VALIDATION TEXT WILL BE REMOVED WHEN YOU FINALIZE YOUR NOTE. IF YOU WANT TO FAX A PRELIMINARY NOTE YOU WILL NEED TO TOGGLE THIS TO 'NO' IF YOU DO NOT WANT IT IN YOUR FAXED NOTE.
Intermediate / Complex Repair - Final Wound Length In Cm: 1.7
Size Of Lesion In Cm: 2
Intermediate / Complex Repair - Final Wound Length In Cm: 2.3

## 2023-03-24 PROBLEM — K21.9 GERD (GASTROESOPHAGEAL REFLUX DISEASE): Status: ACTIVE | Noted: 2023-03-24

## 2023-03-24 PROBLEM — N52.9 ERECTILE DYSFUNCTION: Status: ACTIVE | Noted: 2023-03-24

## 2023-03-29 ENCOUNTER — APPOINTMENT (RX ONLY)
Dept: URBAN - METROPOLITAN AREA CLINIC 36 | Facility: CLINIC | Age: 48
Setting detail: DERMATOLOGY
End: 2023-03-29

## 2023-03-29 DIAGNOSIS — E88.2 LIPOMATOSIS, NOT ELSEWHERE CLASSIFIED: ICD-10-CM

## 2023-03-29 DIAGNOSIS — D492 NEOPLASM OF UNSPECIFIED NATURE OF BONE, SOFT TISSUE, AND SKIN: ICD-10-CM

## 2023-03-29 DIAGNOSIS — Z48.817 ENCOUNTER FOR SURGICAL AFTERCARE FOLLOWING SURGERY ON THE SKIN AND SUBCUTANEOUS TISSUE: ICD-10-CM

## 2023-03-29 PROBLEM — R22.2 LOCALIZED SWELLING, MASS AND LUMP, TRUNK: Status: ACTIVE | Noted: 2023-03-29

## 2023-03-29 PROCEDURE — ? ORDER TESTS

## 2023-03-29 PROCEDURE — ? POST-OP WOUND CHECK

## 2023-03-29 PROCEDURE — 99024 POSTOP FOLLOW-UP VISIT: CPT

## 2023-03-29 PROCEDURE — ? EXCISION

## 2023-03-29 PROCEDURE — 29903: CPT | Mod: 76

## 2023-03-29 PROCEDURE — ? ADDITIONAL NOTES

## 2023-03-29 PROCEDURE — 29903: CPT

## 2023-03-29 ASSESSMENT — LOCATION DETAILED DESCRIPTION DERM
LOCATION DETAILED: PERIUMBILICAL SKIN
LOCATION DETAILED: LEFT ANTECUBITAL SKIN
LOCATION DETAILED: LEFT VENTRAL PROXIMAL FOREARM

## 2023-03-29 ASSESSMENT — LOCATION SIMPLE DESCRIPTION DERM
LOCATION SIMPLE: ABDOMEN
LOCATION SIMPLE: LEFT ELBOW
LOCATION SIMPLE: LEFT FOREARM

## 2023-03-29 ASSESSMENT — LOCATION ZONE DERM
LOCATION ZONE: TRUNK
LOCATION ZONE: ARM

## 2023-03-29 NOTE — PROCEDURE: ORDER TESTS
Bill For Surgical Tray: no
Expected Date Of Service: 03/29/2023
Billing Type: Third-Party Bill
Performing Laboratory: 0

## 2023-03-29 NOTE — PROCEDURE: ADDITIONAL NOTES
Detail Level: Zone
Additional Notes: Fasting blood work
Render Risk Assessment In Note?: yes
Additional Notes: Pt advised to clean over wound and appliy Vaseline daily

## 2023-03-29 NOTE — PROCEDURE: POST-OP WOUND CHECK
Detail Level: Simple
Add 26494 Cpt? (Important Note: In 2017 The Use Of 76389 Is Being Tracked By Cms To Determine Future Global Period Reimbursement For Global Periods): yes
Wound Evaluated By: Jael Mix MD

## 2023-03-29 NOTE — PROCEDURE: EXCISION
Medical Necessity Information: It is in your best interest to select a reason for this procedure from the list below. All of these items fulfill various CMS LCD requirements except lesion extends to a margin.
Include Z78.9 (Other Specified Conditions Influencing Health Status) As An Associated Diagnosis?: No
Medical Necessity Clause: This procedure was medically necessary because the lesion that was treated was:
Lab: [id number]
Lab Facility: 
Surgeon Performing Repair (Optional): Jael Mix
Size Of Lesion In Cm: 3.7
X Size Of Lesion In Cm (Optional): 2.9
Size Of Margin In Cm: 0.2
Anesthesia Volume In Cc: 3
Eye Clamp Note Details: An eye clamp was used during the procedure.
Excision Method: Elliptical
Saucerization Depth: dermis and superficial adipose tissue
Repair Type: Intermediate
Intermediate / Complex Repair - Final Wound Length In Cm: 4.3
Suturegard Retention Suture: 2-0 Nylon
Retention Suture Bite Size: 3 mm
Length To Time In Minutes Device Was In Place: 10
Number Of Hemigard Strips Per Side: 1
Undermining Type: Entire Wound
Complex Requirements: Debridement Of Wound Edges?: Yes
Debridement Text: The wound edges were debrided prior to proceeding with the closure to facilitate wound healing.
Helical Rim Text: The closure involved the helical rim.
Vermilion Border Text: The closure involved the vermilion border.
Nostril Rim Text: The closure involved the nostril rim.
Retention Suture Text: Retention sutures were placed to support the closure and prevent dehiscence.
Primary Defect Length (In Cm): 0
Suture Removal: 14 days
Epidermal Closure Graft Donor Site (Optional): simple interrupted
Graft Donor Site Bandage (Optional-Leave Blank If You Don't Want In Note): Steri-strips and a pressure bandage were applied to the donor site.
Detail Level: Detailed
Excision Depth: adipose tissue
Scalpel Size: 15 blade
Anesthesia Type: 1% lidocaine with epinephrine
Additional Anesthesia Volume In Cc: 6
Hemostasis: Electrocautery
Estimated Blood Loss (Cc): minimal
Deep Sutures: 3-0 Maxon
Epidermal Sutures: 3-0 Surgipro
Epidermal Closure: running
Wound Care: Vaseline
Dressing: pressure dressing with telfa
Suturegard Intro: Intraoperative tissue expansion was performed, utilizing the SUTUREGARD device, in order to reduce wound tension.
Suturegard Body: The suture ends were repeatedly re-tightened and re-clamped to achieve the desired tissue expansion.
Hemigard Intro: Due to skin fragility and wound tension, it was decided to use HEMIGARD adhesive retention suture devices to permit a linear closure. The skin was cleaned and dried for a 6cm distance away from the wound. Excessive hair, if present, was removed to allow for adhesion.
Hemigard Postcare Instructions: The HEMIGARD strips are to remain completely dry for at least 5-7 days.
Positioning (Leave Blank If You Do Not Want): The patient was placed in a comfortable position exposing the surgical site.
Complex Repair Preamble Text (Leave Blank If You Do Not Want): Extensive wide undermining was performed.
Intermediate Repair Preamble Text (Leave Blank If You Do Not Want): Undermining was performed with blunt dissection.
Fusiform Excision Additional Text (Leave Blank If You Do Not Want): The margin was drawn around the clinically apparent lesion.  A fusiform shape was then drawn on the skin incorporating the lesion and margins.  Incisions were then made along these lines to the appropriate tissue plane and the lesion was extirpated.
Eliptical Excision Additional Text (Leave Blank If You Do Not Want): The margin was drawn around the clinically apparent lesion.  An elliptical shape was then drawn on the skin incorporating the lesion and margins.  Incisions were then made along these lines to the appropriate tissue plane and the lesion was extirpated.
Saucerization Excision Additional Text (Leave Blank If You Do Not Want): The margin was drawn around the clinically apparent lesion.  Incisions were then made along these lines, in a tangential fashion, to the appropriate tissue plane and the lesion was extirpated.
Slit Excision Additional Text (Leave Blank If You Do Not Want): A linear line was drawn on the skin overlying the lesion. An incision was made slowly until the lesion was visualized.  Once visualized, the lesion was removed with blunt dissection.
Excisional Biopsy Additional Text (Leave Blank If You Do Not Want): The margin was drawn around the clinically apparent lesion. An elliptical shape was then drawn on the skin incorporating the lesion and margins.  Incisions were then made along these lines to the appropriate tissue plane and the lesion was extirpated.
Perilesional Excision Additional Text (Leave Blank If You Do Not Want): The margin was drawn around the clinically apparent lesion. Incisions were then made along these lines to the appropriate tissue plane and the lesion was extirpated.
Repair Performed By Another Provider Text (Leave Blank If You Do Not Want): After the tissue was excised the defect was repaired by another provider.
No Repair - Repaired With Adjacent Surgical Defect Text (Leave Blank If You Do Not Want): After the excision the defect was repaired concurrently with another surgical defect which was in close approximation.
Adjacent Tissue Transfer Text: The defect edges were debeveled with a #15 scalpel blade.  Given the location of the defect and the proximity to free margins an adjacent tissue transfer was deemed most appropriate.  Using a sterile surgical marker, an appropriate flap was drawn incorporating the defect and placing the expected incisions within the relaxed skin tension lines where possible.    The area thus outlined was incised deep to adipose tissue with a #15 scalpel blade.  The skin margins were undermined to an appropriate distance in all directions utilizing iris scissors.
Advancement Flap (Single) Text: The defect edges were debeveled with a #15 scalpel blade.  Given the location of the defect and the proximity to free margins a single advancement flap was deemed most appropriate.  Using a sterile surgical marker, an appropriate advancement flap was drawn incorporating the defect and placing the expected incisions within the relaxed skin tension lines where possible.    The area thus outlined was incised deep to adipose tissue with a #15 scalpel blade.  The skin margins were undermined to an appropriate distance in all directions utilizing iris scissors.
Advancement Flap (Double) Text: The defect edges were debeveled with a #15 scalpel blade.  Given the location of the defect and the proximity to free margins a double advancement flap was deemed most appropriate.  Using a sterile surgical marker, the appropriate advancement flaps were drawn incorporating the defect and placing the expected incisions within the relaxed skin tension lines where possible.    The area thus outlined was incised deep to adipose tissue with a #15 scalpel blade.  The skin margins were undermined to an appropriate distance in all directions utilizing iris scissors.
Burow's Advancement Flap Text: The defect edges were debeveled with a #15 scalpel blade.  Given the location of the defect and the proximity to free margins a Burow's advancement flap was deemed most appropriate.  Using a sterile surgical marker, the appropriate advancement flap was drawn incorporating the defect and placing the expected incisions within the relaxed skin tension lines where possible.    The area thus outlined was incised deep to adipose tissue with a #15 scalpel blade.  The skin margins were undermined to an appropriate distance in all directions utilizing iris scissors.
Chonodrocutaneous Helical Advancement Flap Text: The defect edges were debeveled with a #15 scalpel blade.  Given the location of the defect and the proximity to free margins a chondrocutaneous helical advancement flap was deemed most appropriate.  Using a sterile surgical marker, the appropriate advancement flap was drawn incorporating the defect and placing the expected incisions within the relaxed skin tension lines where possible.    The area thus outlined was incised deep to adipose tissue with a #15 scalpel blade.  The skin margins were undermined to an appropriate distance in all directions utilizing iris scissors.
Crescentic Advancement Flap Text: The defect edges were debeveled with a #15 scalpel blade.  Given the location of the defect and the proximity to free margins a crescentic advancement flap was deemed most appropriate.  Using a sterile surgical marker, the appropriate advancement flap was drawn incorporating the defect and placing the expected incisions within the relaxed skin tension lines where possible.    The area thus outlined was incised deep to adipose tissue with a #15 scalpel blade.  The skin margins were undermined to an appropriate distance in all directions utilizing iris scissors.
A-T Advancement Flap Text: The defect edges were debeveled with a #15 scalpel blade.  Given the location of the defect, shape of the defect and the proximity to free margins an A-T advancement flap was deemed most appropriate.  Using a sterile surgical marker, an appropriate advancement flap was drawn incorporating the defect and placing the expected incisions within the relaxed skin tension lines where possible.    The area thus outlined was incised deep to adipose tissue with a #15 scalpel blade.  The skin margins were undermined to an appropriate distance in all directions utilizing iris scissors.
O-T Advancement Flap Text: The defect edges were debeveled with a #15 scalpel blade.  Given the location of the defect, shape of the defect and the proximity to free margins an O-T advancement flap was deemed most appropriate.  Using a sterile surgical marker, an appropriate advancement flap was drawn incorporating the defect and placing the expected incisions within the relaxed skin tension lines where possible.    The area thus outlined was incised deep to adipose tissue with a #15 scalpel blade.  The skin margins were undermined to an appropriate distance in all directions utilizing iris scissors.
O-L Flap Text: The defect edges were debeveled with a #15 scalpel blade.  Given the location of the defect, shape of the defect and the proximity to free margins an O-L flap was deemed most appropriate.  Using a sterile surgical marker, an appropriate advancement flap was drawn incorporating the defect and placing the expected incisions within the relaxed skin tension lines where possible.    The area thus outlined was incised deep to adipose tissue with a #15 scalpel blade.  The skin margins were undermined to an appropriate distance in all directions utilizing iris scissors.
O-Z Flap Text: The defect edges were debeveled with a #15 scalpel blade.  Given the location of the defect, shape of the defect and the proximity to free margins an O-Z flap was deemed most appropriate.  Using a sterile surgical marker, an appropriate transposition flap was drawn incorporating the defect and placing the expected incisions within the relaxed skin tension lines where possible. The area thus outlined was incised deep to adipose tissue with a #15 scalpel blade.  The skin margins were undermined to an appropriate distance in all directions utilizing iris scissors.
Double O-Z Flap Text: The defect edges were debeveled with a #15 scalpel blade.  Given the location of the defect, shape of the defect and the proximity to free margins a Double O-Z flap was deemed most appropriate.  Using a sterile surgical marker, an appropriate transposition flap was drawn incorporating the defect and placing the expected incisions within the relaxed skin tension lines where possible. The area thus outlined was incised deep to adipose tissue with a #15 scalpel blade.  The skin margins were undermined to an appropriate distance in all directions utilizing iris scissors.
V-Y Flap Text: The defect edges were debeveled with a #15 scalpel blade.  Given the location of the defect, shape of the defect and the proximity to free margins a V-Y flap was deemed most appropriate.  Using a sterile surgical marker, an appropriate advancement flap was drawn incorporating the defect and placing the expected incisions within the relaxed skin tension lines where possible.    The area thus outlined was incised deep to adipose tissue with a #15 scalpel blade.  The skin margins were undermined to an appropriate distance in all directions utilizing iris scissors.
Advancement-Rotation Flap Text: The defect edges were debeveled with a #15 scalpel blade.  Given the location of the defect, shape of the defect and the proximity to free margins an advancement-rotation flap was deemed most appropriate.  Using a sterile surgical marker, an appropriate flap was drawn incorporating the defect and placing the expected incisions within the relaxed skin tension lines where possible. The area thus outlined was incised deep to adipose tissue with a #15 scalpel blade.  The skin margins were undermined to an appropriate distance in all directions utilizing iris scissors.
Mercedes Flap Text: The defect edges were debeveled with a #15 scalpel blade.  Given the location of the defect, shape of the defect and the proximity to free margins a Mercedes flap was deemed most appropriate.  Using a sterile surgical marker, an appropriate advancement flap was drawn incorporating the defect and placing the expected incisions within the relaxed skin tension lines where possible. The area thus outlined was incised deep to adipose tissue with a #15 scalpel blade.  The skin margins were undermined to an appropriate distance in all directions utilizing iris scissors.
Modified Advancement Flap Text: The defect edges were debeveled with a #15 scalpel blade.  Given the location of the defect, shape of the defect and the proximity to free margins a modified advancement flap was deemed most appropriate.  Using a sterile surgical marker, an appropriate advancement flap was drawn incorporating the defect and placing the expected incisions within the relaxed skin tension lines where possible.    The area thus outlined was incised deep to adipose tissue with a #15 scalpel blade.  The skin margins were undermined to an appropriate distance in all directions utilizing iris scissors.
Mucosal Advancement Flap Text: Given the location of the defect, shape of the defect and the proximity to free margins a mucosal advancement flap was deemed most appropriate. Incisions were made with a 15 blade scalpel in the appropriate fashion along the cutaneous vermillion border and the mucosal lip. The remaining actinically damaged mucosal tissue was excised.  The mucosal advancement flap was then elevated to the gingival sulcus with care taken to preserve the neurovascular structures and advanced into the primary defect. Care was taken to ensure that precise realignment of the vermilion border was achieved.
Peng Advancement Flap Text: The defect edges were debeveled with a #15 scalpel blade.  Given the location of the defect, shape of the defect and the proximity to free margins a Peng advancement flap was deemed most appropriate.  Using a sterile surgical marker, an appropriate advancement flap was drawn incorporating the defect and placing the expected incisions within the relaxed skin tension lines where possible. The area thus outlined was incised deep to adipose tissue with a #15 scalpel blade.  The skin margins were undermined to an appropriate distance in all directions utilizing iris scissors.
Hatchet Flap Text: The defect edges were debeveled with a #15 scalpel blade.  Given the location of the defect, shape of the defect and the proximity to free margins a hatchet flap was deemed most appropriate.  Using a sterile surgical marker, an appropriate hatchet flap was drawn incorporating the defect and placing the expected incisions within the relaxed skin tension lines where possible.    The area thus outlined was incised deep to adipose tissue with a #15 scalpel blade.  The skin margins were undermined to an appropriate distance in all directions utilizing iris scissors.
Rotation Flap Text: The defect edges were debeveled with a #15 scalpel blade.  Given the location of the defect, shape of the defect and the proximity to free margins a rotation flap was deemed most appropriate.  Using a sterile surgical marker, an appropriate rotation flap was drawn incorporating the defect and placing the expected incisions within the relaxed skin tension lines where possible.    The area thus outlined was incised deep to adipose tissue with a #15 scalpel blade.  The skin margins were undermined to an appropriate distance in all directions utilizing iris scissors.
Spiral Flap Text: The defect edges were debeveled with a #15 scalpel blade.  Given the location of the defect, shape of the defect and the proximity to free margins a spiral flap was deemed most appropriate.  Using a sterile surgical marker, an appropriate rotation flap was drawn incorporating the defect and placing the expected incisions within the relaxed skin tension lines where possible. The area thus outlined was incised deep to adipose tissue with a #15 scalpel blade.  The skin margins were undermined to an appropriate distance in all directions utilizing iris scissors.
Staged Advancement Flap Text: The defect edges were debeveled with a #15 scalpel blade.  Given the location of the defect, shape of the defect and the proximity to free margins a staged advancement flap was deemed most appropriate.  Using a sterile surgical marker, an appropriate advancement flap was drawn incorporating the defect and placing the expected incisions within the relaxed skin tension lines where possible. The area thus outlined was incised deep to adipose tissue with a #15 scalpel blade.  The skin margins were undermined to an appropriate distance in all directions utilizing iris scissors.
Star Wedge Flap Text: The defect edges were debeveled with a #15 scalpel blade.  Given the location of the defect, shape of the defect and the proximity to free margins a star wedge flap was deemed most appropriate.  Using a sterile surgical marker, an appropriate rotation flap was drawn incorporating the defect and placing the expected incisions within the relaxed skin tension lines where possible. The area thus outlined was incised deep to adipose tissue with a #15 scalpel blade.  The skin margins were undermined to an appropriate distance in all directions utilizing iris scissors.
Transposition Flap Text: The defect edges were debeveled with a #15 scalpel blade.  Given the location of the defect and the proximity to free margins a transposition flap was deemed most appropriate.  Using a sterile surgical marker, an appropriate transposition flap was drawn incorporating the defect.    The area thus outlined was incised deep to adipose tissue with a #15 scalpel blade.  The skin margins were undermined to an appropriate distance in all directions utilizing iris scissors.
Muscle Hinge Flap Text: The defect edges were debeveled with a #15 scalpel blade.  Given the size, depth and location of the defect and the proximity to free margins a muscle hinge flap was deemed most appropriate.  Using a sterile surgical marker, an appropriate hinge flap was drawn incorporating the defect. The area thus outlined was incised with a #15 scalpel blade.  The skin margins were undermined to an appropriate distance in all directions utilizing iris scissors.
Mustarde Flap Text: The defect edges were debeveled with a #15 scalpel blade.  Given the size, depth and location of the defect and the proximity to free margins a Mustarde flap was deemed most appropriate.  Using a sterile surgical marker, an appropriate flap was drawn incorporating the defect. The area thus outlined was incised with a #15 scalpel blade.  The skin margins were undermined to an appropriate distance in all directions utilizing iris scissors.
Nasal Turnover Hinge Flap Text: The defect edges were debeveled with a #15 scalpel blade.  Given the size, depth, location of the defect and the defect being full thickness a nasal turnover hinge flap was deemed most appropriate.  Using a sterile surgical marker, an appropriate hinge flap was drawn incorporating the defect. The area thus outlined was incised with a #15 scalpel blade. The flap was designed to recreate the nasal mucosal lining and the alar rim. The skin margins were undermined to an appropriate distance in all directions utilizing iris scissors.
Nasalis-Muscle-Based Myocutaneous Island Pedicle Flap Text: Using a #15 blade, an incision was made around the donor flap to the level of the nasalis muscle. Wide lateral undermining was then performed in both the subcutaneous plane above the nasalis muscle, and in a submuscular plane just above periosteum. This allowed the formation of a free nasalis muscle axial pedicle (based on the angular artery) which was still attached to the actual cutaneous flap, increasing its mobility and vascular viability. Hemostasis was obtained with pinpoint electrocoagulation. The flap was mobilized into position and the pivotal anchor points positioned and stabilized with buried interrupted sutures. Subcutaneous and dermal tissues were closed in a multilayered fashion with sutures. Tissue redundancies were excised, and the epidermal edges were apposed without significant tension and sutured with sutures.
Orbicularis Oris Muscle Flap Text: The defect edges were debeveled with a #15 scalpel blade.  Given that the defect affected the competency of the oral sphincter an obicularis oris muscle flap was deemed most appropriate to restore this competency and normal muscle function.  Using a sterile surgical marker, an appropriate flap was drawn incorporating the defect. The area thus outlined was incised with a #15 scalpel blade.
Melolabial Transposition Flap Text: The defect edges were debeveled with a #15 scalpel blade.  Given the location of the defect and the proximity to free margins a melolabial flap was deemed most appropriate.  Using a sterile surgical marker, an appropriate melolabial transposition flap was drawn incorporating the defect.    The area thus outlined was incised deep to adipose tissue with a #15 scalpel blade.  The skin margins were undermined to an appropriate distance in all directions utilizing iris scissors.
Rhombic Flap Text: The defect edges were debeveled with a #15 scalpel blade.  Given the location of the defect and the proximity to free margins a rhombic flap was deemed most appropriate.  Using a sterile surgical marker, an appropriate rhombic flap was drawn incorporating the defect.    The area thus outlined was incised deep to adipose tissue with a #15 scalpel blade.  The skin margins were undermined to an appropriate distance in all directions utilizing iris scissors.
Rhomboid Transposition Flap Text: The defect edges were debeveled with a #15 scalpel blade.  Given the location of the defect and the proximity to free margins a rhomboid transposition flap was deemed most appropriate.  Using a sterile surgical marker, an appropriate rhomboid flap was drawn incorporating the defect.    The area thus outlined was incised deep to adipose tissue with a #15 scalpel blade.  The skin margins were undermined to an appropriate distance in all directions utilizing iris scissors.
Bi-Rhombic Flap Text: The defect edges were debeveled with a #15 scalpel blade.  Given the location of the defect and the proximity to free margins a bi-rhombic flap was deemed most appropriate.  Using a sterile surgical marker, an appropriate rhombic flap was drawn incorporating the defect. The area thus outlined was incised deep to adipose tissue with a #15 scalpel blade.  The skin margins were undermined to an appropriate distance in all directions utilizing iris scissors.
Helical Rim Advancement Flap Text: The defect edges were debeveled with a #15 blade scalpel.  Given the location of the defect and the proximity to free margins (helical rim) a double helical rim advancement flap was deemed most appropriate.  Using a sterile surgical marker, the appropriate advancement flaps were drawn incorporating the defect and placing the expected incisions between the helical rim and antihelix where possible.  The area thus outlined was incised through and through with a #15 scalpel blade.  With a skin hook and iris scissors, the flaps were gently and sharply undermined and freed up.
Bilateral Helical Rim Advancement Flap Text: The defect edges were debeveled with a #15 blade scalpel.  Given the location of the defect and the proximity to free margins (helical rim) a bilateral helical rim advancement flap was deemed most appropriate.  Using a sterile surgical marker, the appropriate advancement flaps were drawn incorporating the defect and placing the expected incisions between the helical rim and antihelix where possible.  The area thus outlined was incised through and through with a #15 scalpel blade.  With a skin hook and iris scissors, the flaps were gently and sharply undermined and freed up.
Ear Star Wedge Flap Text: The defect edges were debeveled with a #15 blade scalpel.  Given the location of the defect and the proximity to free margins (helical rim) an ear star wedge flap was deemed most appropriate.  Using a sterile surgical marker, the appropriate flap was drawn incorporating the defect and placing the expected incisions between the helical rim and antihelix where possible.  The area thus outlined was incised through and through with a #15 scalpel blade.
Banner Transposition Flap Text: The defect edges were debeveled with a #15 scalpel blade.  Given the location of the defect and the proximity to free margins a Banner transposition flap was deemed most appropriate.  Using a sterile surgical marker, an appropriate flap drawn around the defect. The area thus outlined was incised deep to adipose tissue with a #15 scalpel blade.  The skin margins were undermined to an appropriate distance in all directions utilizing iris scissors.
Bilobed Flap Text: The defect edges were debeveled with a #15 scalpel blade.  Given the location of the defect and the proximity to free margins a bilobe flap was deemed most appropriate.  Using a sterile surgical marker, an appropriate bilobe flap drawn around the defect.    The area thus outlined was incised deep to adipose tissue with a #15 scalpel blade.  The skin margins were undermined to an appropriate distance in all directions utilizing iris scissors.
Bilobed Transposition Flap Text: The defect edges were debeveled with a #15 scalpel blade.  Given the location of the defect and the proximity to free margins a bilobed transposition flap was deemed most appropriate.  Using a sterile surgical marker, an appropriate bilobe flap drawn around the defect.    The area thus outlined was incised deep to adipose tissue with a #15 scalpel blade.  The skin margins were undermined to an appropriate distance in all directions utilizing iris scissors.
Trilobed Flap Text: The defect edges were debeveled with a #15 scalpel blade.  Given the location of the defect and the proximity to free margins a trilobed flap was deemed most appropriate.  Using a sterile surgical marker, an appropriate trilobed flap drawn around the defect.    The area thus outlined was incised deep to adipose tissue with a #15 scalpel blade.  The skin margins were undermined to an appropriate distance in all directions utilizing iris scissors.
Dorsal Nasal Flap Text: The defect edges were debeveled with a #15 scalpel blade.  Given the location of the defect and the proximity to free margins a dorsal nasal flap was deemed most appropriate.  Using a sterile surgical marker, an appropriate dorsal nasal flap was drawn around the defect.    The area thus outlined was incised deep to adipose tissue with a #15 scalpel blade.  The skin margins were undermined to an appropriate distance in all directions utilizing iris scissors.
Island Pedicle Flap Text: The defect edges were debeveled with a #15 scalpel blade.  Given the location of the defect, shape of the defect and the proximity to free margins an island pedicle advancement flap was deemed most appropriate.  Using a sterile surgical marker, an appropriate advancement flap was drawn incorporating the defect, outlining the appropriate donor tissue and placing the expected incisions within the relaxed skin tension lines where possible.    The area thus outlined was incised deep to adipose tissue with a #15 scalpel blade.  The skin margins were undermined to an appropriate distance in all directions around the primary defect and laterally outward around the island pedicle utilizing iris scissors.  There was minimal undermining beneath the pedicle flap.
Island Pedicle Flap With Canthal Suspension Text: The defect edges were debeveled with a #15 scalpel blade.  Given the location of the defect, shape of the defect and the proximity to free margins an island pedicle advancement flap was deemed most appropriate.  Using a sterile surgical marker, an appropriate advancement flap was drawn incorporating the defect, outlining the appropriate donor tissue and placing the expected incisions within the relaxed skin tension lines where possible. The area thus outlined was incised deep to adipose tissue with a #15 scalpel blade.  The skin margins were undermined to an appropriate distance in all directions around the primary defect and laterally outward around the island pedicle utilizing iris scissors.  There was minimal undermining beneath the pedicle flap. A suspension suture was placed in the canthal tendon to prevent tension and prevent ectropion.
Alar Island Pedicle Flap Text: The defect edges were debeveled with a #15 scalpel blade.  Given the location of the defect, shape of the defect and the proximity to the alar rim an island pedicle advancement flap was deemed most appropriate.  Using a sterile surgical marker, an appropriate advancement flap was drawn incorporating the defect, outlining the appropriate donor tissue and placing the expected incisions within the nasal ala running parallel to the alar rim. The area thus outlined was incised with a #15 scalpel blade.  The skin margins were undermined minimally to an appropriate distance in all directions around the primary defect and laterally outward around the island pedicle utilizing iris scissors.  There was minimal undermining beneath the pedicle flap.
Double Island Pedicle Flap Text: The defect edges were debeveled with a #15 scalpel blade.  Given the location of the defect, shape of the defect and the proximity to free margins a double island pedicle advancement flap was deemed most appropriate.  Using a sterile surgical marker, an appropriate advancement flap was drawn incorporating the defect, outlining the appropriate donor tissue and placing the expected incisions within the relaxed skin tension lines where possible.    The area thus outlined was incised deep to adipose tissue with a #15 scalpel blade.  The skin margins were undermined to an appropriate distance in all directions around the primary defect and laterally outward around the island pedicle utilizing iris scissors.  There was minimal undermining beneath the pedicle flap.
Island Pedicle Flap-Requiring Vessel Identification Text: The defect edges were debeveled with a #15 scalpel blade.  Given the location of the defect, shape of the defect and the proximity to free margins an island pedicle advancement flap was deemed most appropriate.  Using a sterile surgical marker, an appropriate advancement flap was drawn, based on the axial vessel mentioned above, incorporating the defect, outlining the appropriate donor tissue and placing the expected incisions within the relaxed skin tension lines where possible.    The area thus outlined was incised deep to adipose tissue with a #15 scalpel blade.  The skin margins were undermined to an appropriate distance in all directions around the primary defect and laterally outward around the island pedicle utilizing iris scissors.  There was minimal undermining beneath the pedicle flap.
Keystone Flap Text: The defect edges were debeveled with a #15 scalpel blade.  Given the location of the defect, shape of the defect a keystone flap was deemed most appropriate.  Using a sterile surgical marker, an appropriate keystone flap was drawn incorporating the defect, outlining the appropriate donor tissue and placing the expected incisions within the relaxed skin tension lines where possible. The area thus outlined was incised deep to adipose tissue with a #15 scalpel blade.  The skin margins were undermined to an appropriate distance in all directions around the primary defect and laterally outward around the flap utilizing iris scissors.
O-T Plasty Text: The defect edges were debeveled with a #15 scalpel blade.  Given the location of the defect, shape of the defect and the proximity to free margins an O-T plasty was deemed most appropriate.  Using a sterile surgical marker, an appropriate O-T plasty was drawn incorporating the defect and placing the expected incisions within the relaxed skin tension lines where possible.    The area thus outlined was incised deep to adipose tissue with a #15 scalpel blade.  The skin margins were undermined to an appropriate distance in all directions utilizing iris scissors.
O-Z Plasty Text: The defect edges were debeveled with a #15 scalpel blade.  Given the location of the defect, shape of the defect and the proximity to free margins an O-Z plasty (double transposition flap) was deemed most appropriate.  Using a sterile surgical marker, the appropriate transposition flaps were drawn incorporating the defect and placing the expected incisions within the relaxed skin tension lines where possible.    The area thus outlined was incised deep to adipose tissue with a #15 scalpel blade.  The skin margins were undermined to an appropriate distance in all directions utilizing iris scissors.  Hemostasis was achieved with electrocautery.  The flaps were then transposed into place, one clockwise and the other counterclockwise, and anchored with interrupted buried subcutaneous sutures.
Double O-Z Plasty Text: The defect edges were debeveled with a #15 scalpel blade.  Given the location of the defect, shape of the defect and the proximity to free margins a Double O-Z plasty (double transposition flap) was deemed most appropriate.  Using a sterile surgical marker, the appropriate transposition flaps were drawn incorporating the defect and placing the expected incisions within the relaxed skin tension lines where possible. The area thus outlined was incised deep to adipose tissue with a #15 scalpel blade.  The skin margins were undermined to an appropriate distance in all directions utilizing iris scissors.  Hemostasis was achieved with electrocautery.  The flaps were then transposed into place, one clockwise and the other counterclockwise, and anchored with interrupted buried subcutaneous sutures.
V-Y Plasty Text: The defect edges were debeveled with a #15 scalpel blade.  Given the location of the defect, shape of the defect and the proximity to free margins an V-Y advancement flap was deemed most appropriate.  Using a sterile surgical marker, an appropriate advancement flap was drawn incorporating the defect and placing the expected incisions within the relaxed skin tension lines where possible.    The area thus outlined was incised deep to adipose tissue with a #15 scalpel blade.  The skin margins were undermined to an appropriate distance in all directions utilizing iris scissors.
H Plasty Text: Given the location of the defect, shape of the defect and the proximity to free margins a H-plasty was deemed most appropriate for repair.  Using a sterile surgical marker, the appropriate advancement arms of the H-plasty were drawn incorporating the defect and placing the expected incisions within the relaxed skin tension lines where possible. The area thus outlined was incised deep to adipose tissue with a #15 scalpel blade. The skin margins were undermined to an appropriate distance in all directions utilizing iris scissors.  The opposing advancement arms were then advanced into place in opposite direction and anchored with interrupted buried subcutaneous sutures.
W Plasty Text: The lesion was extirpated to the level of the fat with a #15 scalpel blade.  Given the location of the defect, shape of the defect and the proximity to free margins a W-plasty was deemed most appropriate for repair.  Using a sterile surgical marker, the appropriate transposition arms of the W-plasty were drawn incorporating the defect and placing the expected incisions within the relaxed skin tension lines where possible.    The area thus outlined was incised deep to adipose tissue with a #15 scalpel blade.  The skin margins were undermined to an appropriate distance in all directions utilizing iris scissors.  The opposing transposition arms were then transposed into place in opposite direction and anchored with interrupted buried subcutaneous sutures.
Z Plasty Text: The lesion was extirpated to the level of the fat with a #15 scalpel blade.  Given the location of the defect, shape of the defect and the proximity to free margins a Z-plasty was deemed most appropriate for repair.  Using a sterile surgical marker, the appropriate transposition arms of the Z-plasty were drawn incorporating the defect and placing the expected incisions within the relaxed skin tension lines where possible.    The area thus outlined was incised deep to adipose tissue with a #15 scalpel blade.  The skin margins were undermined to an appropriate distance in all directions utilizing iris scissors.  The opposing transposition arms were then transposed into place in opposite direction and anchored with interrupted buried subcutaneous sutures.
Zygomaticofacial Flap Text: Given the location of the defect, shape of the defect and the proximity to free margins a zygomaticofacial flap was deemed most appropriate for repair.  Using a sterile surgical marker, the appropriate flap was drawn incorporating the defect and placing the expected incisions within the relaxed skin tension lines where possible. The area thus outlined was incised deep to adipose tissue with a #15 scalpel blade with preservation of a vascular pedicle.  The skin margins were undermined to an appropriate distance in all directions utilizing iris scissors.  The flap was then placed into the defect and anchored with interrupted buried subcutaneous sutures.
Cheek Interpolation Flap Text: A decision was made to reconstruct the defect utilizing an interpolation axial flap and a staged reconstruction.  A telfa template was made of the defect.  This telfa template was then used to outline the Cheek Interpolation flap.  The donor area for the pedicle flap was then injected with anesthesia.  The flap was excised through the skin and subcutaneous tissue down to the layer of the underlying musculature.  The interpolation flap was carefully excised within this deep plane to maintain its blood supply.  The edges of the donor site were undermined.   The donor site was closed in a primary fashion.  The pedicle was then rotated into position and sutured.  Once the tube was sutured into place, adequate blood supply was confirmed with blanching and refill.  The pedicle was then wrapped with xeroform gauze and dressed appropriately with a telfa and gauze bandage to ensure continued blood supply and protect the attached pedicle.
Cheek-To-Nose Interpolation Flap Text: A decision was made to reconstruct the defect utilizing an interpolation axial flap and a staged reconstruction.  A telfa template was made of the defect.  This telfa template was then used to outline the Cheek-To-Nose Interpolation flap.  The donor area for the pedicle flap was then injected with anesthesia.  The flap was excised through the skin and subcutaneous tissue down to the layer of the underlying musculature.  The interpolation flap was carefully excised within this deep plane to maintain its blood supply.  The edges of the donor site were undermined.   The donor site was closed in a primary fashion.  The pedicle was then rotated into position and sutured.  Once the tube was sutured into place, adequate blood supply was confirmed with blanching and refill.  The pedicle was then wrapped with xeroform gauze and dressed appropriately with a telfa and gauze bandage to ensure continued blood supply and protect the attached pedicle.
Interpolation Flap Text: A decision was made to reconstruct the defect utilizing an interpolation axial flap and a staged reconstruction.  A telfa template was made of the defect.  This telfa template was then used to outline the interpolation flap.  The donor area for the pedicle flap was then injected with anesthesia.  The flap was excised through the skin and subcutaneous tissue down to the layer of the underlying musculature.  The interpolation flap was carefully excised within this deep plane to maintain its blood supply.  The edges of the donor site were undermined.   The donor site was closed in a primary fashion.  The pedicle was then rotated into position and sutured.  Once the tube was sutured into place, adequate blood supply was confirmed with blanching and refill.  The pedicle was then wrapped with xeroform gauze and dressed appropriately with a telfa and gauze bandage to ensure continued blood supply and protect the attached pedicle.
Melolabial Interpolation Flap Text: A decision was made to reconstruct the defect utilizing an interpolation axial flap and a staged reconstruction.  A telfa template was made of the defect.  This telfa template was then used to outline the melolabial interpolation flap.  The donor area for the pedicle flap was then injected with anesthesia.  The flap was excised through the skin and subcutaneous tissue down to the layer of the underlying musculature.  The pedicle flap was carefully excised within this deep plane to maintain its blood supply.  The edges of the donor site were undermined.   The donor site was closed in a primary fashion.  The pedicle was then rotated into position and sutured.  Once the tube was sutured into place, adequate blood supply was confirmed with blanching and refill.  The pedicle was then wrapped with xeroform gauze and dressed appropriately with a telfa and gauze bandage to ensure continued blood supply and protect the attached pedicle.
Mastoid Interpolation Flap Text: A decision was made to reconstruct the defect utilizing an interpolation axial flap and a staged reconstruction.  A telfa template was made of the defect.  This telfa template was then used to outline the mastoid interpolation flap.  The donor area for the pedicle flap was then injected with anesthesia.  The flap was excised through the skin and subcutaneous tissue down to the layer of the underlying musculature.  The pedicle flap was carefully excised within this deep plane to maintain its blood supply.  The edges of the donor site were undermined.   The donor site was closed in a primary fashion.  The pedicle was then rotated into position and sutured.  Once the tube was sutured into place, adequate blood supply was confirmed with blanching and refill.  The pedicle was then wrapped with xeroform gauze and dressed appropriately with a telfa and gauze bandage to ensure continued blood supply and protect the attached pedicle.
Posterior Auricular Interpolation Flap Text: A decision was made to reconstruct the defect utilizing an interpolation axial flap and a staged reconstruction.  A telfa template was made of the defect.  This telfa template was then used to outline the posterior auricular interpolation flap.  The donor area for the pedicle flap was then injected with anesthesia.  The flap was excised through the skin and subcutaneous tissue down to the layer of the underlying musculature.  The pedicle flap was carefully excised within this deep plane to maintain its blood supply.  The edges of the donor site were undermined.   The donor site was closed in a primary fashion.  The pedicle was then rotated into position and sutured.  Once the tube was sutured into place, adequate blood supply was confirmed with blanching and refill.  The pedicle was then wrapped with xeroform gauze and dressed appropriately with a telfa and gauze bandage to ensure continued blood supply and protect the attached pedicle.
Paramedian Forehead Flap Text: A decision was made to reconstruct the defect utilizing an interpolation axial flap and a staged reconstruction.  A telfa template was made of the defect.  This telfa template was then used to outline the paramedian forehead pedicle flap.  The donor area for the pedicle flap was then injected with anesthesia.  The flap was excised through the skin and subcutaneous tissue down to the layer of the underlying musculature.  The pedicle flap was carefully excised within this deep plane to maintain its blood supply.  The edges of the donor site were undermined.   The donor site was closed in a primary fashion.  The pedicle was then rotated into position and sutured.  Once the tube was sutured into place, adequate blood supply was confirmed with blanching and refill.  The pedicle was then wrapped with xeroform gauze and dressed appropriately with a telfa and gauze bandage to ensure continued blood supply and protect the attached pedicle.
Abbe Flap (Upper To Lower Lip) Text: The defect of the lower lip was assessed and measured.  Given the location and size of the defect, an Abbe flap was deemed most appropriate.  Using a sterile surgical marker, an appropriate Abbe flap was measured and drawn on the upper lip. Local anesthesia was then infiltrated.  A scalpel was then used to incise the upper lip through and through the skin, vermilion, muscle and mucosa, leaving the flap pedicled on the opposite side.  The flap was then rotated and transferred to the lower lip defect.  The flap was then sutured into place with a three layer technique, closing the orbicularis oris muscle layer with subcutaneous buried sutures, followed by a mucosal layer and an epidermal layer.
Abbe Flap (Lower To Upper Lip) Text: The defect of the upper lip was assessed and measured.  Given the location and size of the defect, an Abbe flap was deemed most appropriate.  Using a sterile surgical marker, an appropriate Abbe flap was measured and drawn on the lower lip. Local anesthesia was then infiltrated. A scalpel was then used to incise the upper lip through and through the skin, vermilion, muscle and mucosa, leaving the flap pedicled on the opposite side.  The flap was then rotated and transferred to the lower lip defect.  The flap was then sutured into place with a three layer technique, closing the orbicularis oris muscle layer with subcutaneous buried sutures, followed by a mucosal layer and an epidermal layer.
Estlander Flap (Upper To Lower Lip) Text: The defect of the lower lip was assessed and measured.  Given the location and size of the defect, an Estlander flap was deemed most appropriate.  Using a sterile surgical marker, an appropriate Estlander flap was measured and drawn on the upper lip. Local anesthesia was then infiltrated. A scalpel was then used to incise the lateral aspect of the flap, through skin, muscle and mucosa, leaving the flap pedicled medially.  The flap was then rotated and positioned to fill the lower lip defect.  The flap was then sutured into place with a three layer technique, closing the orbicularis oris muscle layer with subcutaneous buried sutures, followed by a mucosal layer and an epidermal layer.
Lip Wedge Excision Repair Text: Given the location of the defect and the proximity to free margins a full thickness wedge repair was deemed most appropriate.  Using a sterile surgical marker, the appropriate repair was drawn incorporating the defect and placing the expected incisions perpendicular to the vermilion border.  The vermilion border was also meticulously outlined to ensure appropriate reapproximation during the repair.  The area thus outlined was incised through and through with a #15 scalpel blade.  The muscularis and dermis were reaproximated with deep sutures following hemostasis. Care was taken to realign the vermilion border before proceeding with the superficial closure.  Once the vermilion was realigned the superfical and mucosal closure was finished.
Ftsg Text: The defect edges were debeveled with a #15 scalpel blade.  Given the location of the defect, shape of the defect and the proximity to free margins a full thickness skin graft was deemed most appropriate.  Using a sterile surgical marker, the primary defect shape was transferred to the donor site. The area thus outlined was incised deep to adipose tissue with a #15 scalpel blade.  The harvested graft was then trimmed of adipose tissue until only dermis and epidermis was left.  The skin margins of the secondary defect were undermined to an appropriate distance in all directions utilizing iris scissors.  The secondary defect was closed with interrupted buried subcutaneous sutures.  The skin edges were then re-apposed with running  sutures.  The skin graft was then placed in the primary defect and oriented appropriately.
Split-Thickness Skin Graft Text: The defect edges were debeveled with a #15 scalpel blade.  Given the location of the defect, shape of the defect and the proximity to free margins a split thickness skin graft was deemed most appropriate.  Using a sterile surgical marker, the primary defect shape was transferred to the donor site. The split thickness graft was then harvested.  The skin graft was then placed in the primary defect and oriented appropriately.
Burow's Graft Text: The defect edges were debeveled with a #15 scalpel blade.  Given the location of the defect, shape of the defect, the proximity to free margins and the presence of a standing cone deformity a Burow's skin graft was deemed most appropriate. The standing cone was removed and this tissue was then trimmed to the shape of the primary defect. The adipose tissue was also removed until only dermis and epidermis were left.  The skin margins of the secondary defect were undermined to an appropriate distance in all directions utilizing iris scissors.  The secondary defect was closed with interrupted buried subcutaneous sutures.  The skin edges were then re-apposed with running  sutures.  The skin graft was then placed in the primary defect and oriented appropriately.
Cartilage Graft Text: The defect edges were debeveled with a #15 scalpel blade.  Given the location of the defect, shape of the defect, the fact the defect involved a full thickness cartilage defect a cartilage graft was deemed most appropriate.  An appropriate donor site was identified, cleansed, and anesthetized. The cartilage graft was then harvested and transferred to the recipient site, oriented appropriately and then sutured into place.  The secondary defect was then repaired using a primary closure.
Composite Graft Text: The defect edges were debeveled with a #15 scalpel blade.  Given the location of the defect, shape of the defect, the proximity to free margins and the fact the defect was full thickness a composite graft was deemed most appropriate.  The defect was outline and then transferred to the donor site.  A full thickness graft was then excised from the donor site. The graft was then placed in the primary defect, oriented appropriately and then sutured into place.  The secondary defect was then repaired using a primary closure.
Epidermal Autograft Text: The defect edges were debeveled with a #15 scalpel blade.  Given the location of the defect, shape of the defect and the proximity to free margins an epidermal autograft was deemed most appropriate.  Using a sterile surgical marker, the primary defect shape was transferred to the donor site. The epidermal graft was then harvested.  The skin graft was then placed in the primary defect and oriented appropriately.
Dermal Autograft Text: The defect edges were debeveled with a #15 scalpel blade.  Given the location of the defect, shape of the defect and the proximity to free margins a dermal autograft was deemed most appropriate.  Using a sterile surgical marker, the primary defect shape was transferred to the donor site. The area thus outlined was incised deep to adipose tissue with a #15 scalpel blade.  The harvested graft was then trimmed of adipose and epidermal tissue until only dermis was left.  The skin graft was then placed in the primary defect and oriented appropriately.
Skin Substitute Text: The defect edges were debeveled with a #15 scalpel blade.  Given the location of the defect, shape of the defect and the proximity to free margins a skin substitute graft was deemed most appropriate.  The graft material was trimmed to fit the size of the defect. The graft was then placed in the primary defect and oriented appropriately.
Tissue Cultured Epidermal Autograft Text: The defect edges were debeveled with a #15 scalpel blade.  Given the location of the defect, shape of the defect and the proximity to free margins a tissue cultured epidermal autograft was deemed most appropriate.  The graft was then trimmed to fit the size of the defect.  The graft was then placed in the primary defect and oriented appropriately.
Xenograft Text: The defect edges were debeveled with a #15 scalpel blade.  Given the location of the defect, shape of the defect and the proximity to free margins a xenograft was deemed most appropriate.  The graft was then trimmed to fit the size of the defect.  The graft was then placed in the primary defect and oriented appropriately.
Purse String (Intermediate) Text: Given the location of the defect and the characteristics of the surrounding skin a purse string intermediate closure was deemed most appropriate.  Undermining was performed circumferentially around the surgical defect.  A purse string suture was then placed and tightened.
Purse String (Simple) Text: Given the location of the defect and the characteristics of the surrounding skin a purse string simple closure was deemed most appropriate.  Undermining was performed circumferentially around the surgical defect.  A purse string suture was then placed and tightened.
Complex Repair And Single Advancement Flap Text: The defect edges were debeveled with a #15 scalpel blade.  The primary defect was closed partially with a complex linear closure.  Given the location of the remaining defect, shape of the defect and the proximity to free margins a single advancement flap was deemed most appropriate for complete closure of the defect.  Using a sterile surgical marker, an appropriate advancement flap was drawn incorporating the defect and placing the expected incisions within the relaxed skin tension lines where possible.    The area thus outlined was incised deep to adipose tissue with a #15 scalpel blade.  The skin margins were undermined to an appropriate distance in all directions utilizing iris scissors.
Complex Repair And Double Advancement Flap Text: The defect edges were debeveled with a #15 scalpel blade.  The primary defect was closed partially with a complex linear closure.  Given the location of the remaining defect, shape of the defect and the proximity to free margins a double advancement flap was deemed most appropriate for complete closure of the defect.  Using a sterile surgical marker, an appropriate advancement flap was drawn incorporating the defect and placing the expected incisions within the relaxed skin tension lines where possible.    The area thus outlined was incised deep to adipose tissue with a #15 scalpel blade.  The skin margins were undermined to an appropriate distance in all directions utilizing iris scissors.
Complex Repair And Modified Advancement Flap Text: The defect edges were debeveled with a #15 scalpel blade.  The primary defect was closed partially with a complex linear closure.  Given the location of the remaining defect, shape of the defect and the proximity to free margins a modified advancement flap was deemed most appropriate for complete closure of the defect.  Using a sterile surgical marker, an appropriate advancement flap was drawn incorporating the defect and placing the expected incisions within the relaxed skin tension lines where possible.    The area thus outlined was incised deep to adipose tissue with a #15 scalpel blade.  The skin margins were undermined to an appropriate distance in all directions utilizing iris scissors.
Complex Repair And A-T Advancement Flap Text: The defect edges were debeveled with a #15 scalpel blade.  The primary defect was closed partially with a complex linear closure.  Given the location of the remaining defect, shape of the defect and the proximity to free margins an A-T advancement flap was deemed most appropriate for complete closure of the defect.  Using a sterile surgical marker, an appropriate advancement flap was drawn incorporating the defect and placing the expected incisions within the relaxed skin tension lines where possible.    The area thus outlined was incised deep to adipose tissue with a #15 scalpel blade.  The skin margins were undermined to an appropriate distance in all directions utilizing iris scissors.
Complex Repair And O-T Advancement Flap Text: The defect edges were debeveled with a #15 scalpel blade.  The primary defect was closed partially with a complex linear closure.  Given the location of the remaining defect, shape of the defect and the proximity to free margins an O-T advancement flap was deemed most appropriate for complete closure of the defect.  Using a sterile surgical marker, an appropriate advancement flap was drawn incorporating the defect and placing the expected incisions within the relaxed skin tension lines where possible.    The area thus outlined was incised deep to adipose tissue with a #15 scalpel blade.  The skin margins were undermined to an appropriate distance in all directions utilizing iris scissors.
Complex Repair And O-L Flap Text: The defect edges were debeveled with a #15 scalpel blade.  The primary defect was closed partially with a complex linear closure.  Given the location of the remaining defect, shape of the defect and the proximity to free margins an O-L flap was deemed most appropriate for complete closure of the defect.  Using a sterile surgical marker, an appropriate flap was drawn incorporating the defect and placing the expected incisions within the relaxed skin tension lines where possible.    The area thus outlined was incised deep to adipose tissue with a #15 scalpel blade.  The skin margins were undermined to an appropriate distance in all directions utilizing iris scissors.
Complex Repair And Bilobe Flap Text: The defect edges were debeveled with a #15 scalpel blade.  The primary defect was closed partially with a complex linear closure.  Given the location of the remaining defect, shape of the defect and the proximity to free margins a bilobe flap was deemed most appropriate for complete closure of the defect.  Using a sterile surgical marker, an appropriate advancement flap was drawn incorporating the defect and placing the expected incisions within the relaxed skin tension lines where possible.    The area thus outlined was incised deep to adipose tissue with a #15 scalpel blade.  The skin margins were undermined to an appropriate distance in all directions utilizing iris scissors.
Complex Repair And Melolabial Flap Text: The defect edges were debeveled with a #15 scalpel blade.  The primary defect was closed partially with a complex linear closure.  Given the location of the remaining defect, shape of the defect and the proximity to free margins a melolabial flap was deemed most appropriate for complete closure of the defect.  Using a sterile surgical marker, an appropriate advancement flap was drawn incorporating the defect and placing the expected incisions within the relaxed skin tension lines where possible.    The area thus outlined was incised deep to adipose tissue with a #15 scalpel blade.  The skin margins were undermined to an appropriate distance in all directions utilizing iris scissors.
Complex Repair And Rotation Flap Text: The defect edges were debeveled with a #15 scalpel blade.  The primary defect was closed partially with a complex linear closure.  Given the location of the remaining defect, shape of the defect and the proximity to free margins a rotation flap was deemed most appropriate for complete closure of the defect.  Using a sterile surgical marker, an appropriate advancement flap was drawn incorporating the defect and placing the expected incisions within the relaxed skin tension lines where possible.    The area thus outlined was incised deep to adipose tissue with a #15 scalpel blade.  The skin margins were undermined to an appropriate distance in all directions utilizing iris scissors.
Complex Repair And Rhombic Flap Text: The defect edges were debeveled with a #15 scalpel blade.  The primary defect was closed partially with a complex linear closure.  Given the location of the remaining defect, shape of the defect and the proximity to free margins a rhombic flap was deemed most appropriate for complete closure of the defect.  Using a sterile surgical marker, an appropriate advancement flap was drawn incorporating the defect and placing the expected incisions within the relaxed skin tension lines where possible.    The area thus outlined was incised deep to adipose tissue with a #15 scalpel blade.  The skin margins were undermined to an appropriate distance in all directions utilizing iris scissors.
Complex Repair And Transposition Flap Text: The defect edges were debeveled with a #15 scalpel blade.  The primary defect was closed partially with a complex linear closure.  Given the location of the remaining defect, shape of the defect and the proximity to free margins a transposition flap was deemed most appropriate for complete closure of the defect.  Using a sterile surgical marker, an appropriate advancement flap was drawn incorporating the defect and placing the expected incisions within the relaxed skin tension lines where possible.    The area thus outlined was incised deep to adipose tissue with a #15 scalpel blade.  The skin margins were undermined to an appropriate distance in all directions utilizing iris scissors.
Complex Repair And V-Y Plasty Text: The defect edges were debeveled with a #15 scalpel blade.  The primary defect was closed partially with a complex linear closure.  Given the location of the remaining defect, shape of the defect and the proximity to free margins a V-Y plasty was deemed most appropriate for complete closure of the defect.  Using a sterile surgical marker, an appropriate advancement flap was drawn incorporating the defect and placing the expected incisions within the relaxed skin tension lines where possible.    The area thus outlined was incised deep to adipose tissue with a #15 scalpel blade.  The skin margins were undermined to an appropriate distance in all directions utilizing iris scissors.
Complex Repair And M Plasty Text: The defect edges were debeveled with a #15 scalpel blade.  The primary defect was closed partially with a complex linear closure.  Given the location of the remaining defect, shape of the defect and the proximity to free margins an M plasty was deemed most appropriate for complete closure of the defect.  Using a sterile surgical marker, an appropriate advancement flap was drawn incorporating the defect and placing the expected incisions within the relaxed skin tension lines where possible.    The area thus outlined was incised deep to adipose tissue with a #15 scalpel blade.  The skin margins were undermined to an appropriate distance in all directions utilizing iris scissors.
Complex Repair And Double M Plasty Text: The defect edges were debeveled with a #15 scalpel blade.  The primary defect was closed partially with a complex linear closure.  Given the location of the remaining defect, shape of the defect and the proximity to free margins a double M plasty was deemed most appropriate for complete closure of the defect.  Using a sterile surgical marker, an appropriate advancement flap was drawn incorporating the defect and placing the expected incisions within the relaxed skin tension lines where possible.    The area thus outlined was incised deep to adipose tissue with a #15 scalpel blade.  The skin margins were undermined to an appropriate distance in all directions utilizing iris scissors.
Complex Repair And W Plasty Text: The defect edges were debeveled with a #15 scalpel blade.  The primary defect was closed partially with a complex linear closure.  Given the location of the remaining defect, shape of the defect and the proximity to free margins a W plasty was deemed most appropriate for complete closure of the defect.  Using a sterile surgical marker, an appropriate advancement flap was drawn incorporating the defect and placing the expected incisions within the relaxed skin tension lines where possible.    The area thus outlined was incised deep to adipose tissue with a #15 scalpel blade.  The skin margins were undermined to an appropriate distance in all directions utilizing iris scissors.
Complex Repair And Z Plasty Text: The defect edges were debeveled with a #15 scalpel blade.  The primary defect was closed partially with a complex linear closure.  Given the location of the remaining defect, shape of the defect and the proximity to free margins a Z plasty was deemed most appropriate for complete closure of the defect.  Using a sterile surgical marker, an appropriate advancement flap was drawn incorporating the defect and placing the expected incisions within the relaxed skin tension lines where possible.    The area thus outlined was incised deep to adipose tissue with a #15 scalpel blade.  The skin margins were undermined to an appropriate distance in all directions utilizing iris scissors.
Complex Repair And Dorsal Nasal Flap Text: The defect edges were debeveled with a #15 scalpel blade.  The primary defect was closed partially with a complex linear closure.  Given the location of the remaining defect, shape of the defect and the proximity to free margins a dorsal nasal flap was deemed most appropriate for complete closure of the defect.  Using a sterile surgical marker, an appropriate flap was drawn incorporating the defect and placing the expected incisions within the relaxed skin tension lines where possible.    The area thus outlined was incised deep to adipose tissue with a #15 scalpel blade.  The skin margins were undermined to an appropriate distance in all directions utilizing iris scissors.
Complex Repair And Ftsg Text: The defect edges were debeveled with a #15 scalpel blade.  The primary defect was closed partially with a complex linear closure.  Given the location of the defect, shape of the defect and the proximity to free margins a full thickness skin graft was deemed most appropriate to repair the remaining defect.  The graft was trimmed to fit the size of the remaining defect.  The graft was then placed in the primary defect, oriented appropriately, and sutured into place.
Complex Repair And Burow's Graft Text: The defect edges were debeveled with a #15 scalpel blade.  The primary defect was closed partially with a complex linear closure.  Given the location of the defect, shape of the defect, the proximity to free margins and the presence of a standing cone deformity a Burow's graft was deemed most appropriate to repair the remaining defect.  The graft was trimmed to fit the size of the remaining defect.  The graft was then placed in the primary defect, oriented appropriately, and sutured into place.
Complex Repair And Split-Thickness Skin Graft Text: The defect edges were debeveled with a #15 scalpel blade.  The primary defect was closed partially with a complex linear closure.  Given the location of the defect, shape of the defect and the proximity to free margins a split thickness skin graft was deemed most appropriate to repair the remaining defect.  The graft was trimmed to fit the size of the remaining defect.  The graft was then placed in the primary defect, oriented appropriately, and sutured into place.
Complex Repair And Epidermal Autograft Text: The defect edges were debeveled with a #15 scalpel blade.  The primary defect was closed partially with a complex linear closure.  Given the location of the defect, shape of the defect and the proximity to free margins an epidermal autograft was deemed most appropriate to repair the remaining defect.  The graft was trimmed to fit the size of the remaining defect.  The graft was then placed in the primary defect, oriented appropriately, and sutured into place.
Complex Repair And Dermal Autograft Text: The defect edges were debeveled with a #15 scalpel blade.  The primary defect was closed partially with a complex linear closure.  Given the location of the defect, shape of the defect and the proximity to free margins an dermal autograft was deemed most appropriate to repair the remaining defect.  The graft was trimmed to fit the size of the remaining defect.  The graft was then placed in the primary defect, oriented appropriately, and sutured into place.
Complex Repair And Tissue Cultured Epidermal Autograft Text: The defect edges were debeveled with a #15 scalpel blade.  The primary defect was closed partially with a complex linear closure.  Given the location of the defect, shape of the defect and the proximity to free margins an tissue cultured epidermal autograft was deemed most appropriate to repair the remaining defect.  The graft was trimmed to fit the size of the remaining defect.  The graft was then placed in the primary defect, oriented appropriately, and sutured into place.
Complex Repair And Xenograft Text: The defect edges were debeveled with a #15 scalpel blade.  The primary defect was closed partially with a complex linear closure.  Given the location of the defect, shape of the defect and the proximity to free margins a xenograft was deemed most appropriate to repair the remaining defect.  The graft was trimmed to fit the size of the remaining defect.  The graft was then placed in the primary defect, oriented appropriately, and sutured into place.
Complex Repair And Skin Substitute Graft Text: The defect edges were debeveled with a #15 scalpel blade.  The primary defect was closed partially with a complex linear closure.  Given the location of the remaining defect, shape of the defect and the proximity to free margins a skin substitute graft was deemed most appropriate to repair the remaining defect.  The graft was trimmed to fit the size of the remaining defect.  The graft was then placed in the primary defect, oriented appropriately, and sutured into place.
Path Notes (To The Dermatopathologist): Please check margins.
Consent was obtained from the patient. The risks and benefits to therapy were discussed in detail. Specifically, the risks of infection, scarring, bleeding, prolonged wound healing, incomplete removal, allergy to anesthesia, nerve injury and recurrence were addressed. Prior to the procedure, the treatment site was clearly identified and confirmed by the patient. All components of Universal Protocol/PAUSE Rule completed.
Post-Care Instructions: I reviewed with the patient in detail post-care instructions. Patient is not to engage in any heavy lifting, exercise, or swimming for the next 14 days. Should the patient develop any fevers, chills, bleeding, severe pain patient will contact the office immediately.
Home Suture Removal Text: Patient was provided a home suture removal kit and will remove their sutures at home.  If they have any questions or difficulties they will call the office.
Where Do You Want The Question To Include Opioid Counseling Located?: Case Summary Tab
Billing Type: Third-Party Bill
Information: Selecting Yes will display possible errors in your note based on the variables you have selected. This validation is only offered as a suggestion for you. PLEASE NOTE THAT THE VALIDATION TEXT WILL BE REMOVED WHEN YOU FINALIZE YOUR NOTE. IF YOU WANT TO FAX A PRELIMINARY NOTE YOU WILL NEED TO TOGGLE THIS TO 'NO' IF YOU DO NOT WANT IT IN YOUR FAXED NOTE.
Size Of Lesion In Cm: 3.4
Excision Method: Slit
Intermediate / Complex Repair - Final Wound Length In Cm: 3.5

## 2023-04-12 ENCOUNTER — APPOINTMENT (RX ONLY)
Dept: URBAN - METROPOLITAN AREA CLINIC 36 | Facility: CLINIC | Age: 48
Setting detail: DERMATOLOGY
End: 2023-04-12

## 2023-04-12 DIAGNOSIS — Z48.02 ENCOUNTER FOR REMOVAL OF SUTURES: ICD-10-CM

## 2023-04-12 PROCEDURE — ? SUTURE REMOVAL (GLOBAL PERIOD)

## 2023-04-12 ASSESSMENT — LOCATION DETAILED DESCRIPTION DERM: LOCATION DETAILED: PERIUMBILICAL SKIN

## 2023-04-12 ASSESSMENT — LOCATION ZONE DERM: LOCATION ZONE: TRUNK

## 2023-04-12 ASSESSMENT — LOCATION SIMPLE DESCRIPTION DERM: LOCATION SIMPLE: ABDOMEN

## 2023-04-12 NOTE — PROCEDURE: SUTURE REMOVAL (GLOBAL PERIOD)
Detail Level: Detailed
Add 94417 Cpt? (Important Note: In 2017 The Use Of 00352 Is Being Tracked By Cms To Determine Future Global Period Reimbursement For Global Periods): no

## 2023-07-24 PROBLEM — M65.312 TRIGGER THUMB, LEFT THUMB: Status: ACTIVE | Noted: 2023-07-24

## 2023-08-06 NOTE — PROGRESS NOTES
HPI: Lauro Daley is a 42 y.o. male who presents with   Chief Complaint   Patient presents with   • Sinus Problem    Patient presents to urgent care with sinus pressure and pain for the past 2 weeks yesterday he had an episode where he started sneezing and his left eye got very watery and itchy and swollen that dissipated over some time. He denies any new contacts to cosmetics detergents or environmental irritants. No nausea vomiting or diarrhea he's had a mild cough as well. Still hard to sleep at night. He does wear C Pap at night which has been cumbersome to him more recently. He denies any significant shortness of breath no chest pain.    Worsened by: activity, laying supine at night, first thing in the morning, when exposed to outside allergens  Improved by: OTC symptomatic medictions      PMH:  has a past medical history of Asthma.  MEDS:   Current Outpatient Prescriptions:   •  Hydrocod Polst-CPM Polst ER (TUSSIONEX) 10-8 MG/5ML Suspension Extended Release, Take 5 mL by mouth every 12 hours as needed for Cough or Rhinitis. Will cause sedation, avoid driving, operating heavy machinery, and drinking alcohol, Disp: 60 mL, Rfl: 0  •  amoxicillin-clavulanate (AUGMENTIN) 875-125 MG Tab, Take 1 Tab by mouth 2 times a day for 10 days., Disp: 20 Tab, Rfl: 0  •  predniSONE (DELTASONE) 10 MG TABS, Take 1 Tab by mouth every day. Take with food 40 mg po daily X3, 30 mg daily X 3 , 20 mg daily X 3, 10 mg daily X 3 , 5 mg daily X 3. In tapering dose fashion. (Patient not taking: Reported on 1/1/2017), Disp: 32 Each, Rfl: 0  •  albuterol (VENTOLIN OR PROVENTIL) 108 (90 BASE) MCG/ACT AERS, Inhale 1-2 Puffs by mouth every four hours as needed for Shortness of Breath (wheeze). (Patient not taking: Reported on 1/1/2017), Disp: 1 Inhaler, Rfl: 3  •  azithromycin (ZITHROMAX) 500 MG tablet, Take 0.5 Tabs by mouth every day. (Patient not taking: Reported on 1/1/2017), Disp: 4 Tab, Rfl: 0  ALLERGIES:   Allergies   Allergen  Reactions   • Sulfa Drugs Rash     body   SURGHX:   Past Surgical History:   Procedure Laterality Date   • OTHER      sleep apnea surgery   SOCHX:  reports that he has never smoked. His smokeless tobacco use includes Chew. He reports that he does not drink alcohol or use drugs.  FH: Family history was reviewed, no pertinent findings to report    PE:  Vitals /100   Pulse (!) 108   Temp 37 °C (98.6 °F)   Resp 16   Ht 1.829 m (6')   Wt 107 kg (236 lb)   SpO2 95%   BMI 32.01 kg/m²    Gen AOx4, NAD  HEENT: moist mucus membranes, Nasal congestion is present, no pain or pressure with percussion of frontal, maxillary or ethmoid sinuses.  Bilateral conjunciva with erythema but without exudate,  Bilateral TM's with erythema, bulge, fluid without loss of landmarks, mild pharyngeal erythema without tonsillar exudate or tonsillar enlargement  Neck: supple, no cervical lymphadenopathy, no signs of menigismus  CV/PULM: RRR no murmurs, no rales mild ronchi are present without  wheezes, no signs of resp distress  Abd soft nontender, bs present  Skin no rashes  Extremities -c/c/e  Neuro appropriate affect,     A/P  1. Acute non-recurrent sinusitis, unspecified location  amoxicillin-clavulanate (AUGMENTIN) 875-125 MG Tab   2. Acute bronchitis, unspecified organism  Hydrocod Polst-CPM Polst ER (TUSSIONEX) 10-8 MG/5ML Suspension Extended Release    amoxicillin-clavulanate (AUGMENTIN) 875-125 MG Tab   3. Dysfunction of both eustachian tubes     4. Allergic conjunctivitis of left eye       Follow-up with primary care provider within 4-5 days, emergency room precautions discussed.  Patient and/or family appears understanding of information.     vaginal bleeding

## 2023-08-07 ENCOUNTER — HOSPITAL ENCOUNTER (OUTPATIENT)
Dept: RADIOLOGY | Facility: MEDICAL CENTER | Age: 48
End: 2023-08-07
Attending: STUDENT IN AN ORGANIZED HEALTH CARE EDUCATION/TRAINING PROGRAM
Payer: COMMERCIAL

## 2023-08-07 DIAGNOSIS — R31.0 GROSS HEMATURIA: ICD-10-CM

## 2023-08-07 PROCEDURE — 74178 CT ABD&PLV WO CNTR FLWD CNTR: CPT

## 2023-08-07 PROCEDURE — 700117 HCHG RX CONTRAST REV CODE 255: Performed by: STUDENT IN AN ORGANIZED HEALTH CARE EDUCATION/TRAINING PROGRAM

## 2023-08-07 RX ADMIN — IOHEXOL 100 ML: 350 INJECTION, SOLUTION INTRAVENOUS at 09:00

## 2023-09-02 ENCOUNTER — OFFICE VISIT (OUTPATIENT)
Dept: URGENT CARE | Facility: CLINIC | Age: 48
End: 2023-09-02
Payer: COMMERCIAL

## 2023-09-02 VITALS
OXYGEN SATURATION: 99 % | RESPIRATION RATE: 20 BRPM | BODY MASS INDEX: 33.86 KG/M2 | HEIGHT: 72 IN | DIASTOLIC BLOOD PRESSURE: 84 MMHG | SYSTOLIC BLOOD PRESSURE: 128 MMHG | WEIGHT: 250 LBS | HEART RATE: 111 BPM | TEMPERATURE: 97.6 F

## 2023-09-02 DIAGNOSIS — S05.00XA CORNEAL ABRASION, UNSPECIFIED LATERALITY, INITIAL ENCOUNTER: ICD-10-CM

## 2023-09-02 PROCEDURE — 3074F SYST BP LT 130 MM HG: CPT | Performed by: REGISTERED NURSE

## 2023-09-02 PROCEDURE — 99213 OFFICE O/P EST LOW 20 MIN: CPT | Performed by: REGISTERED NURSE

## 2023-09-02 PROCEDURE — 3079F DIAST BP 80-89 MM HG: CPT | Performed by: REGISTERED NURSE

## 2023-09-02 RX ORDER — OFLOXACIN 3 MG/ML
1 SOLUTION/ DROPS OPHTHALMIC 4 TIMES DAILY
Qty: 5 ML | Refills: 0 | Status: SHIPPED | OUTPATIENT
Start: 2023-09-02 | End: 2023-09-07

## 2023-09-02 ASSESSMENT — ENCOUNTER SYMPTOMS
NECK PAIN: 0
HEADACHES: 0
CHILLS: 0
FEVER: 0
DIZZINESS: 0
SHORTNESS OF BREATH: 0

## 2023-09-02 NOTE — PROGRESS NOTES
Subjective:   Lauro Daley is a 47 y.o. male who presents for Eye Burn (1 week)    HPI  One week ago patient was cutting concrete and developed bilateral eye irritation. Feels gritty. Has woken up with crusty eyelids, but lots of clear watering t/o day. No light sensitivity. No pain. Reports some blurry vision. Does not wear contacts, does wear glasses. No recent illness.    Review of Systems   Constitutional:  Negative for chills and fever.   HENT:  Negative for congestion.    Respiratory:  Negative for shortness of breath.    Cardiovascular:  Negative for chest pain.   Musculoskeletal:  Negative for neck pain.   Neurological:  Negative for dizziness and headaches.       Medications, Allergies, and current problem list reviewed today in Epic.     Objective:     /84   Pulse (!) 111   Temp 36.4 °C (97.6 °F) (Temporal)   Resp 20   Ht 1.829 m (6')   Wt 113 kg (250 lb)   SpO2 99%     Physical Exam  Vitals and nursing note reviewed.   Constitutional:       Appearance: Normal appearance. He is not ill-appearing or toxic-appearing.   HENT:      Mouth/Throat:      Mouth: Mucous membranes are moist.   Eyes:      General: Lids are normal. Lids are everted, no foreign bodies appreciated. Gaze aligned appropriately.         Right eye: No foreign body.         Left eye: No foreign body.      Extraocular Movements:      Right eye: No nystagmus.      Left eye: No nystagmus.      Conjunctiva/sclera:      Right eye: Right conjunctiva is injected. No chemosis, exudate or hemorrhage.     Left eye: Left conjunctiva is injected. No chemosis, exudate or hemorrhage.     Pupils: Pupils are equal, round, and reactive to light.      Comments: Corneal abrasion noted 7 o clock iris (right eye). Corneal abrasion 3 o clock region (Left eye)   Cardiovascular:      Rate and Rhythm: Regular rhythm. Tachycardia present.      Heart sounds: No murmur heard.  Pulmonary:      Effort: Pulmonary effort is normal. No respiratory  distress.      Breath sounds: Normal breath sounds.   Abdominal:      General: Abdomen is flat.      Palpations: Abdomen is soft.   Musculoskeletal:         General: Normal range of motion.      Cervical back: Normal range of motion.   Skin:     General: Skin is warm and dry.      Capillary Refill: Capillary refill takes less than 2 seconds.      Findings: No rash.   Neurological:      General: No focal deficit present.      Mental Status: He is alert and oriented to person, place, and time.      Cranial Nerves: No cranial nerve deficit.   Psychiatric:         Mood and Affect: Mood normal.       VA  R 20/40, L 20/40, Both 20/40    Assessment/Plan:     Diagnosis and associated orders:     1. Corneal abrasion, unspecified laterality, initial encounter  ofloxacin (OCUFLOX) 0.3 % Solution           Comments/MDM:   Differential diagnosis discussed     Pleasant 47-year-old male presenting with bilateral eye irritation after cutting concrete without eye protection, this occurred a few days ago.  Does not have eye pain, no photophobia, no bright flashing lights, does report some blurry vision.  Does not wear contacts but does use glasses.  Waking up with some crusty eyelids but throughout the day mostly watery.  Tachycardic at 111 otherwise normal vitals.  Does appear well, normal tracking, no visible foreign bodies, Woods lamp examination with fluorescein stain reveals bilateral corneal abrasions.  Visual acuity is 20/40 in right, left and both eyes without correction.  Will place on ofloxacin eyedrops.  Discussed the importance of eye protection.  Follow-up with his eye doctor ASAP.    Return to clinic or go to ED if symptoms worsen or persist. Indications for ED discussed at length. Patient/Parent/Guardian voices understanding. Follow-up with your primary care provider in 3-5 days. Red flag symptoms discussed. All side effects of medication discussed including allergic response, GI upset, tendon injury, rash, sedation  etc.    I personally reviewed prior external notes and test results pertinent to today's visit as well as additional imaging and testing completed in clinic today.     Please note that this dictation was created using voice recognition software. I have made every reasonable attempt to correct obvious errors, but I expect that there are errors of grammar and possibly content that I did not discover before finalizing the note.    This note was electronically signed by SUE Rowe

## 2023-11-22 ENCOUNTER — OFFICE VISIT (OUTPATIENT)
Dept: URGENT CARE | Facility: PHYSICIAN GROUP | Age: 48
End: 2023-11-22
Payer: COMMERCIAL

## 2023-11-22 VITALS
WEIGHT: 259.4 LBS | SYSTOLIC BLOOD PRESSURE: 136 MMHG | HEART RATE: 104 BPM | HEIGHT: 72 IN | BODY MASS INDEX: 35.13 KG/M2 | RESPIRATION RATE: 14 BRPM | TEMPERATURE: 98.1 F | OXYGEN SATURATION: 93 % | DIASTOLIC BLOOD PRESSURE: 84 MMHG

## 2023-11-22 DIAGNOSIS — J01.40 ACUTE NON-RECURRENT PANSINUSITIS: ICD-10-CM

## 2023-11-22 DIAGNOSIS — J40 BRONCHITIS: ICD-10-CM

## 2023-11-22 PROCEDURE — 3075F SYST BP GE 130 - 139MM HG: CPT | Performed by: NURSE PRACTITIONER

## 2023-11-22 PROCEDURE — 99213 OFFICE O/P EST LOW 20 MIN: CPT | Performed by: NURSE PRACTITIONER

## 2023-11-22 PROCEDURE — 3079F DIAST BP 80-89 MM HG: CPT | Performed by: NURSE PRACTITIONER

## 2023-11-22 RX ORDER — AMOXICILLIN AND CLAVULANATE POTASSIUM 875; 125 MG/1; MG/1
1 TABLET, FILM COATED ORAL 2 TIMES DAILY
Qty: 20 TABLET | Refills: 0 | Status: SHIPPED | OUTPATIENT
Start: 2023-11-22 | End: 2023-12-02

## 2023-11-22 RX ORDER — PREDNISONE 20 MG/1
40 TABLET ORAL DAILY
Qty: 10 TABLET | Refills: 0 | Status: SHIPPED | OUTPATIENT
Start: 2023-11-22 | End: 2023-11-27

## 2023-11-22 RX ORDER — ALBUTEROL SULFATE 90 UG/1
2 AEROSOL, METERED RESPIRATORY (INHALATION) EVERY 6 HOURS PRN
Qty: 8.5 G | Refills: 0 | Status: SHIPPED | OUTPATIENT
Start: 2023-11-22

## 2023-11-22 RX ORDER — DEXTROMETHORPHAN HYDROBROMIDE AND PROMETHAZINE HYDROCHLORIDE 15; 6.25 MG/5ML; MG/5ML
5 SYRUP ORAL EVERY 6 HOURS PRN
Qty: 118 ML | Refills: 0 | Status: SHIPPED | OUTPATIENT
Start: 2023-11-22 | End: 2023-11-29

## 2023-11-23 NOTE — PROGRESS NOTES
Date: 11/22/23     Chief Complaint:    Chief Complaint   Patient presents with    Cough     For about three weeks was sick, cough has not gone away, wheezing, fatigue         History of Present Illness: 48 y.o. male  presents 3-week history of cough that is dry incessant and worse at night.  Patient states he is having some sinus congestion with pressure and pain to his upper teeth.  He denies any readmittance of fever or body aches.  He states he is fatigued although this is due to the cough keeping him up at night.  Patient does have a history of pneumonia he states it does not feel like pneumonia at this time.  He states it feels like bronchitis.  He denies any severe shortness of breath chest pain or lower leg swelling.  No nausea vomiting diarrhea.    ROS:  As stated in HPI       Medical/SX/ Social History:  Reviewed per chart    Medications:    Current Outpatient Medications on File Prior to Visit   Medication Sig Dispense Refill    tamsulosin (FLOMAX) 0.4 MG capsule Take 0.4 mg by mouth 1/2 hour after breakfast.      sildenafil citrate (VIAGRA) 50 MG tablet Take 1 Tablet by mouth 1 time a day as needed for Erectile Dysfunction. 10 Tablet 3    pantoprazole (PROTONIX) 40 MG Tablet Delayed Response Take 1 Tablet by mouth every day. 90 Tablet 3    sucralfate (CARAFATE) 1 GM/10ML Suspension Take 10 mL by mouth 4 times a day. (Patient not taking: Reported on 9/2/2023) 414 mL 3    testosterone cypionate (DEPO-TESTOSTERONE) 200 MG/ML Solution injection INJECT 2 ML EVERY 2 WEEKS BY INTRAMUSCULAR ROUTE FOR 30 DAYS       No current facility-administered medications on file prior to visit.        Allergies:    Sulfa drugs     Problem list, medications, and allergies reviewed by myself today in Epic       Physical Exam:  Vitals:    11/22/23 1625   BP: 136/84   Pulse: (!) 104   Resp: 14   Temp: 36.7 °C (98.1 °F)   SpO2: 93%        Physical Exam  Constitutional:       General: He is awake.      Appearance: Normal  appearance. He is not ill-appearing, toxic-appearing or diaphoretic.   HENT:      Head: Normocephalic and atraumatic.      Right Ear: Tympanic membrane, ear canal and external ear normal.      Left Ear: Tympanic membrane, ear canal and external ear normal.      Nose: Mucosal edema, congestion and rhinorrhea present. Rhinorrhea is clear.      Right Turbinates: Swollen.      Left Turbinates: Swollen.      Right Sinus: Maxillary sinus tenderness present.      Left Sinus: Maxillary sinus tenderness present.      Mouth/Throat:      Lips: Pink.      Mouth: Mucous membranes are moist.      Tongue: No lesions.      Palate: No lesions.      Pharynx: Pharyngeal swelling and posterior oropharyngeal erythema (post nasal drip) present. No oropharyngeal exudate or uvula swelling.      Tonsils: No tonsillar exudate or tonsillar abscesses.   Eyes:      General: Lids are normal. Gaze aligned appropriately. No allergic shiner or scleral icterus.     Extraocular Movements: Extraocular movements intact.      Conjunctiva/sclera: Conjunctivae normal.      Pupils: Pupils are equal, round, and reactive to light.   Cardiovascular:      Rate and Rhythm: Normal rate and regular rhythm.      Pulses:           Radial pulses are 2+ on the right side and 2+ on the left side.      Heart sounds: Normal heart sounds.   Pulmonary:      Effort: Pulmonary effort is normal.      Breath sounds: Normal air entry. Examination of the right-upper field reveals wheezing. Examination of the left-upper field reveals wheezing. Wheezing present. No decreased breath sounds, rhonchi or rales.   Abdominal:      General: Abdomen is flat. Bowel sounds are normal.      Palpations: Abdomen is soft.      Tenderness: There is no abdominal tenderness.   Musculoskeletal:      Right lower leg: No edema.      Left lower leg: No edema.   Lymphadenopathy:      Cervical: No cervical adenopathy.   Skin:     General: Skin is warm.      Capillary Refill: Capillary refill takes less  than 2 seconds.      Coloration: Skin is not cyanotic or pale.   Neurological:      Mental Status: He is alert and oriented to person, place, and time.      Gait: Gait is intact.   Psychiatric:         Behavior: Behavior normal. Behavior is cooperative.            Medical Decision Making / Plan :  I personally reviewed prior external notes and test results pertinent to today's visit. Pt is clinically stable at today's acute urgent care visit.  Patient appears nontoxic with no acute distress noted. Appropriate for outpatient care at this time. The patient remained stable during the urgent care visit.     Pleasant 48 y.o. male  presented clinic with HPI exam findings consistent with acute bacterial pansinusitis as well as bronchitis.  Although did discuss obtaining a chest x-ray in clinic today to rule out pneumonia.  Patient would like to defer chest x-ray at this time.  He states if his symptoms or not improving he will return to clinic.  We will treat patient with Augmentin burst of prednisone as well as an albuterol inhaler.  Shared decision-making was utilized with patient for treatment plan. Differential Diagnosis, natural history, and supportive care discussed. Did advise patient on conservative measures for management of symptoms.  Patient is agreeable to pursue adequate rest, adequate hydration, saltwater gargle and Neti pot for any symptoms of upper respiratory congestion.  Over-the-counter analgesia and antipyretics on a p.r.n. basis as needed for pain and fever.  Did discuss over-the-counter cough medications.        1. Acute non-recurrent pansinusitis    - amoxicillin-clavulanate (AUGMENTIN) 875-125 MG Tab; Take 1 Tablet by mouth 2 times a day for 10 days.  Dispense: 20 Tablet; Refill: 0    2. Bronchitis    - albuterol 108 (90 Base) MCG/ACT Aero Soln inhalation aerosol; Inhale 2 Puffs every 6 hours as needed for Shortness of Breath.  Dispense: 8.5 g; Refill: 0  - promethazine-dextromethorphan  (PROMETHAZINE-DM) 6.25-15 MG/5ML syrup; Take 5 mL by mouth every 6 hours as needed for Cough for up to 7 days. (caution: may cause sedation)  Dispense: 118 mL; Refill: 0  - predniSONE (DELTASONE) 20 MG Tab; Take 2 Tablets by mouth every day for 5 days.  Dispense: 10 Tablet; Refill: 0     Medication discussed included indication for use and the potential benefits and side effects. Education was provided regarding the aforementioned assessments.  All of the patient's questions were answered to their satisfaction at the time of discharge. Patient was encouraged to monitor symptoms closely. Those signs and symptoms which would warrant concern and mandate seeking a higher level of service through the emergency department discussed at length.  Patient stated agreement and understanding of this plan of care.        Disposition:  Patient was discharged in stable condition.    Voice Recognition Disclaimer: Portions of this document were created using voice recognition software. The software does have a chance of producing errors of grammar and possibly content. I have made every reasonable attempt to correct obvious errors, but there may be errors of grammar and possibly content that I did not discover before finalizing the documentation.    CESAR [place].

## 2023-12-21 ENCOUNTER — APPOINTMENT (RX ONLY)
Dept: URBAN - METROPOLITAN AREA CLINIC 15 | Facility: CLINIC | Age: 48
Setting detail: DERMATOLOGY
End: 2023-12-21

## 2023-12-21 DIAGNOSIS — D22 MELANOCYTIC NEVI: ICD-10-CM

## 2023-12-21 DIAGNOSIS — F42.4 EXCORIATION (SKIN-PICKING) DISORDER: ICD-10-CM

## 2023-12-21 DIAGNOSIS — L81.4 OTHER MELANIN HYPERPIGMENTATION: ICD-10-CM

## 2023-12-21 DIAGNOSIS — L82.1 OTHER SEBORRHEIC KERATOSIS: ICD-10-CM

## 2023-12-21 DIAGNOSIS — L90.5 SCAR CONDITIONS AND FIBROSIS OF SKIN: ICD-10-CM

## 2023-12-21 DIAGNOSIS — D18.0 HEMANGIOMA: ICD-10-CM

## 2023-12-21 PROBLEM — D22.62 MELANOCYTIC NEVI OF LEFT UPPER LIMB, INCLUDING SHOULDER: Status: ACTIVE | Noted: 2023-12-21

## 2023-12-21 PROBLEM — D22.5 MELANOCYTIC NEVI OF TRUNK: Status: ACTIVE | Noted: 2023-12-21

## 2023-12-21 PROBLEM — D18.01 HEMANGIOMA OF SKIN AND SUBCUTANEOUS TISSUE: Status: ACTIVE | Noted: 2023-12-21

## 2023-12-21 PROBLEM — D22.39 MELANOCYTIC NEVI OF OTHER PARTS OF FACE: Status: ACTIVE | Noted: 2023-12-21

## 2023-12-21 PROBLEM — D22.61 MELANOCYTIC NEVI OF RIGHT UPPER LIMB, INCLUDING SHOULDER: Status: ACTIVE | Noted: 2023-12-21

## 2023-12-21 PROCEDURE — ? COUNSELING

## 2023-12-21 PROCEDURE — ? OBSERVATION

## 2023-12-21 PROCEDURE — 99213 OFFICE O/P EST LOW 20 MIN: CPT

## 2023-12-21 ASSESSMENT — LOCATION DETAILED DESCRIPTION DERM
LOCATION DETAILED: LEFT ANTERIOR DISTAL UPPER ARM
LOCATION DETAILED: RIGHT ANTERIOR DISTAL UPPER ARM
LOCATION DETAILED: LEFT VENTRAL PROXIMAL FOREARM
LOCATION DETAILED: RIGHT CENTRAL MALAR CHEEK
LOCATION DETAILED: RIGHT SUPERIOR CRUS OF ANTIHELIX
LOCATION DETAILED: LEFT VENTRAL DISTAL FOREARM
LOCATION DETAILED: RIGHT SUPERIOR LATERAL MALAR CHEEK
LOCATION DETAILED: SUPERIOR LUMBAR SPINE
LOCATION DETAILED: RIGHT INFERIOR MEDIAL FOREHEAD
LOCATION DETAILED: RIGHT PROXIMAL DORSAL FOREARM
LOCATION DETAILED: RIGHT RADIAL DORSAL HAND
LOCATION DETAILED: LEFT RADIAL DORSAL HAND
LOCATION DETAILED: PERIUMBILICAL SKIN
LOCATION DETAILED: RIGHT SUPERIOR MEDIAL MIDBACK
LOCATION DETAILED: LEFT MEDIAL INFERIOR CHEST
LOCATION DETAILED: RIGHT VENTRAL PROXIMAL FOREARM
LOCATION DETAILED: LEFT INFERIOR UPPER BACK
LOCATION DETAILED: LEFT ANTECUBITAL SKIN
LOCATION DETAILED: EPIGASTRIC SKIN

## 2023-12-21 ASSESSMENT — LOCATION SIMPLE DESCRIPTION DERM
LOCATION SIMPLE: LEFT UPPER ARM
LOCATION SIMPLE: LEFT FOREARM
LOCATION SIMPLE: RIGHT FOREARM
LOCATION SIMPLE: LEFT ELBOW
LOCATION SIMPLE: RIGHT CHEEK
LOCATION SIMPLE: RIGHT UPPER ARM
LOCATION SIMPLE: CHEST
LOCATION SIMPLE: LEFT HAND
LOCATION SIMPLE: ABDOMEN
LOCATION SIMPLE: RIGHT LOWER BACK
LOCATION SIMPLE: RIGHT EAR
LOCATION SIMPLE: LOWER BACK
LOCATION SIMPLE: RIGHT HAND
LOCATION SIMPLE: LEFT UPPER BACK
LOCATION SIMPLE: RIGHT FOREHEAD

## 2023-12-21 ASSESSMENT — LOCATION ZONE DERM
LOCATION ZONE: HAND
LOCATION ZONE: TRUNK
LOCATION ZONE: ARM
LOCATION ZONE: FACE
LOCATION ZONE: EAR

## 2024-04-04 RX ORDER — PANTOPRAZOLE SODIUM 40 MG/1
40 TABLET, DELAYED RELEASE ORAL DAILY
Qty: 30 TABLET | Refills: 0 | Status: SHIPPED | OUTPATIENT
Start: 2024-04-04

## 2024-04-04 NOTE — TELEPHONE ENCOUNTER
Received request via: Pharmacy    Was the patient seen in the last year in this department? No    Does the patient have an active prescription (recently filled or refills available) for medication(s) requested? No    [facility]     Does the patient have residential Plus and need 100 day supply (blood pressure, diabetes and cholesterol meds only)? Patient does not have SCP

## 2024-04-09 DIAGNOSIS — R61 HYPERHIDROSIS: ICD-10-CM

## 2024-04-09 RX ORDER — GLYCOPYRROLATE 1 MG/1
1 TABLET ORAL 3 TIMES DAILY
Qty: 90 TABLET | Refills: 3 | Status: SHIPPED | OUTPATIENT
Start: 2024-04-09

## 2024-05-01 NOTE — TELEPHONE ENCOUNTER
Received request via: Pharmacy    Was the patient seen in the last year in this department? No    Does the patient have an active prescription (recently filled or refills available) for medication(s) requested? No    Pharmacy Name: [id number] - [place], [place] - [address]     Does the patient have prison Plus and need 100 day supply (blood pressure, diabetes and cholesterol meds only)? Patient does not have SCP

## 2024-05-02 RX ORDER — PANTOPRAZOLE SODIUM 40 MG/1
40 TABLET, DELAYED RELEASE ORAL DAILY
Qty: 90 TABLET | Refills: 3 | Status: SHIPPED | OUTPATIENT
Start: 2024-05-02

## 2024-06-01 NOTE — PROGRESS NOTES
Verbal consent was acquired by the patient to use [employer] ambient listening note generation during this visit Yes     Subjective:     CC:   Chief Complaint   Patient presents with    Weight Gain         HPI:     History of Present Illness  Lauro Daley is a 48-year-old male who presents for follow-up visit.  The patient reports a significant and rapid weight gain of unknown etiology.  He reports the bulk of his weight gain is in his abdominal area.  He also reports thickening of his ankles.  He is prescribed testosterone injections but does not take them consistently as he must rely on his girlfriend to administer them and she does not live in the [facility].  He also reports increased difficulty breathing with activity. He consumes one large glass of whiskey nightly and more on weekends.  He is concerned about what is causing this weight gain as his diet has not changed and he is very active at work.             Past Medical History:   Diagnosis Date    Acute maxillary sinusitis 4/1/2021    Asthma     as a child    Asthma exacerbation 4/13/2014    Hypoxia 4/13/2014    Methamphetamine abuse in remission (HCC) 7/9/2020    Obesity (BMI 30-39.9) 6/24/2018       Social History     Tobacco Use    Smoking status: Never    Smokeless tobacco: Current     Types: Chew   Vaping Use    Vaping status: Never Used   Substance Use Topics    Alcohol use: No    Drug use: No       Current Outpatient Medications Ordered in Epic   Medication Sig Dispense Refill    Tirzepatide-Weight Management 2.5 MG/0.5ML Solution Auto-injector Inject 2.5 mg under the skin every 7 days. 2 mL 3    pantoprazole (PROTONIX) 40 MG Tablet Delayed Response TAKE ONE TABLET BY MOUTH ONE TIME DAILY 90 Tablet 3    tamsulosin (FLOMAX) 0.4 MG capsule Take 0.4 mg by mouth 1/2 hour after breakfast.      sildenafil citrate (VIAGRA) 50 MG tablet Take 1 Tablet by mouth 1 time a day as needed for Erectile Dysfunction. 10 Tablet 3    testosterone cypionate  (DEPO-TESTOSTERONE) 200 MG/ML injection 2.25 mL.      sucralfate (CARAFATE) 1 GM/10ML Suspension Take 10 mL by mouth 4 times a day. (Patient not taking: Reported on 9/2/2023) 414 mL 3     No current Epic-ordered facility-administered medications on file.       Allergies:  Sulfa drugs    Health Maintenance: Completed    Review of Systems:  No fevers or chills. No cough, chest pain, or shortness of breath.       Objective:       Exam:  /82   Pulse 100   Temp 36.6 °C (97.9 °F) (Temporal)   Ht 1.829 m (6')   Wt (!) 125 kg (276 lb)   SpO2 93%   BMI 37.43 kg/m²  Body mass index is 37.43 kg/m².    Gen: Alert and oriented, No apparent distress.  Lungs: Normal effort, CTA bilaterally, no wheezes, rhonchi, or rales  CV: Regular rate and rhythm. No murmurs, rubs, or gallops.  Ext: No clubbing, cyanosis, edema.        Assessment & Plan:     48 y.o. male with the following -     Class 2 severe obesity with serious comorbidity and body mass index (BMI) of 37.0 to 37.9 in adult, unspecified obesity type (HCC)  GUERO (obstructive sleep apnea)  SOB (shortness of breath)  Screening for deficiency anemia  Thyroid disorder screen  Encounter for screening for diabetes mellitus  Screening for cardiovascular condition  Chronic condition, progressive.  The patient's current weight is 276 pounds with a BMI 37.43.  He reports a rapid increase in his weight without significant changes in his diet and activity level.  She reports thickening his ankles and progressive shortness of breath with even minimal activity.  He states the weight around his abdomen is particularly bothersome and makes it difficult for him to tie his shoes.  - Tirzepatide-Weight Management 2.5 MG/0.5ML Solution Auto-injector; Inject 2.5 mg under the skin every 7 days.  Dispense: 2 mL; Refill: 3  - EC-ECHOCARDIOGRAM COMPLETE W/O CONT; Future  - CBC WITH DIFFERENTIAL; Future  - TSH; Future  - T3 FREE; Future  - FREE THYROXINE; Future  - Comp Metabolic Panel;  Future  - HEMOGLOBIN A1C; Future  - Lipid Profile; Future      Return in about 6 months (around 12/5/2024) for 6-month f/u visit.    Please note that this dictation was created using voice recognition software. I have made every reasonable attempt to correct obvious errors, but I expect that there are errors of grammar and possibly content that I did not discover before finalizing the note.

## 2024-06-05 ENCOUNTER — OFFICE VISIT (OUTPATIENT)
Dept: MEDICAL GROUP | Facility: PHYSICIAN GROUP | Age: 49
End: 2024-06-05
Payer: COMMERCIAL

## 2024-06-05 VITALS
HEIGHT: 72 IN | DIASTOLIC BLOOD PRESSURE: 82 MMHG | WEIGHT: 276 LBS | OXYGEN SATURATION: 93 % | SYSTOLIC BLOOD PRESSURE: 130 MMHG | HEART RATE: 100 BPM | TEMPERATURE: 97.9 F | BODY MASS INDEX: 37.38 KG/M2

## 2024-06-05 DIAGNOSIS — Z13.6 SCREENING FOR CARDIOVASCULAR CONDITION: ICD-10-CM

## 2024-06-05 DIAGNOSIS — E66.01 CLASS 2 SEVERE OBESITY WITH SERIOUS COMORBIDITY AND BODY MASS INDEX (BMI) OF 37.0 TO 37.9 IN ADULT, UNSPECIFIED OBESITY TYPE (HCC): ICD-10-CM

## 2024-06-05 DIAGNOSIS — Z13.29 THYROID DISORDER SCREEN: ICD-10-CM

## 2024-06-05 DIAGNOSIS — G47.33 OSA (OBSTRUCTIVE SLEEP APNEA): ICD-10-CM

## 2024-06-05 DIAGNOSIS — R06.02 SOB (SHORTNESS OF BREATH): ICD-10-CM

## 2024-06-05 DIAGNOSIS — Z13.0 SCREENING FOR DEFICIENCY ANEMIA: ICD-10-CM

## 2024-06-05 DIAGNOSIS — Z13.1 ENCOUNTER FOR SCREENING FOR DIABETES MELLITUS: ICD-10-CM

## 2024-06-05 PROCEDURE — 3079F DIAST BP 80-89 MM HG: CPT | Performed by: INTERNAL MEDICINE

## 2024-06-05 PROCEDURE — 99214 OFFICE O/P EST MOD 30 MIN: CPT | Performed by: INTERNAL MEDICINE

## 2024-06-05 PROCEDURE — 3075F SYST BP GE 130 - 139MM HG: CPT | Performed by: INTERNAL MEDICINE

## 2024-06-05 ASSESSMENT — PATIENT HEALTH QUESTIONNAIRE - PHQ9: CLINICAL INTERPRETATION OF PHQ2 SCORE: 0

## 2024-06-17 PROBLEM — E66.01 CLASS 2 SEVERE OBESITY WITH SERIOUS COMORBIDITY AND BODY MASS INDEX (BMI) OF 37.0 TO 37.9 IN ADULT (HCC): Status: ACTIVE | Noted: 2024-06-17

## 2024-06-17 PROBLEM — E66.812 CLASS 2 SEVERE OBESITY WITH SERIOUS COMORBIDITY AND BODY MASS INDEX (BMI) OF 37.0 TO 37.9 IN ADULT (HCC): Status: ACTIVE | Noted: 2024-06-17

## 2024-06-17 RX ORDER — TESTOSTERONE CYPIONATE 200 MG/ML
2.25 INJECTION, SOLUTION INTRAMUSCULAR
COMMUNITY

## 2024-07-04 LAB
ALBUMIN SERPL-MCNC: 4.3 G/DL (ref 4.1–5.1)
ALP SERPL-CCNC: 67 IU/L (ref 44–121)
ALT SERPL-CCNC: 28 IU/L (ref 0–44)
AST SERPL-CCNC: 20 IU/L (ref 0–40)
BASOPHILS # BLD AUTO: 0.1 X10E3/UL (ref 0–0.2)
BASOPHILS NFR BLD AUTO: 1 %
BILIRUB SERPL-MCNC: 0.3 MG/DL (ref 0–1.2)
BUN SERPL-MCNC: 15 MG/DL (ref 6–24)
BUN/CREAT SERPL: 13 (ref 9–20)
CALCIUM SERPL-MCNC: 8.9 MG/DL (ref 8.7–10.2)
CHLORIDE SERPL-SCNC: 103 MMOL/L (ref 96–106)
CHOLEST SERPL-MCNC: 203 MG/DL (ref 100–199)
CO2 SERPL-SCNC: 25 MMOL/L (ref 20–29)
CREAT SERPL-MCNC: 1.2 MG/DL (ref 0.76–1.27)
EGFRCR SERPLBLD CKD-EPI 2021: 74 ML/MIN/1.73
EOSINOPHIL # BLD AUTO: 0.2 X10E3/UL (ref 0–0.4)
EOSINOPHIL NFR BLD AUTO: 2 %
ERYTHROCYTE [DISTWIDTH] IN BLOOD BY AUTOMATED COUNT: 12.9 % (ref 11.6–15.4)
GLOBULIN SER CALC-MCNC: 2 G/DL (ref 1.5–4.5)
GLUCOSE SERPL-MCNC: 88 MG/DL (ref 70–99)
HBA1C MFR BLD: 5.9 % (ref 4.8–5.6)
HCT VFR BLD AUTO: 55.7 % (ref 37.5–51)
HDLC SERPL-MCNC: 47 MG/DL
HGB BLD-MCNC: 17.7 G/DL (ref 13–17.7)
IMM GRANULOCYTES # BLD AUTO: 0 X10E3/UL (ref 0–0.1)
IMM GRANULOCYTES NFR BLD AUTO: 0 %
IMMATURE CELLS  115398: ABNORMAL
LDL CALC COMMENT:: ABNORMAL
LDLC SERPL CALC-MCNC: 132 MG/DL (ref 0–99)
LYMPHOCYTES # BLD AUTO: 2.1 X10E3/UL (ref 0.7–3.1)
LYMPHOCYTES NFR BLD AUTO: 25 %
MCH RBC QN AUTO: 29.5 PG (ref 26.6–33)
MCHC RBC AUTO-ENTMCNC: 31.8 G/DL (ref 31.5–35.7)
MCV RBC AUTO: 93 FL (ref 79–97)
MONOCYTES # BLD AUTO: 0.5 X10E3/UL (ref 0.1–0.9)
MONOCYTES NFR BLD AUTO: 6 %
MORPHOLOGY BLD-IMP: ABNORMAL
NEUTROPHILS # BLD AUTO: 5.5 X10E3/UL (ref 1.4–7)
NEUTROPHILS NFR BLD AUTO: 66 %
NRBC BLD AUTO-RTO: ABNORMAL %
PLATELET # BLD AUTO: 308 X10E3/UL (ref 150–450)
POTASSIUM SERPL-SCNC: 4.7 MMOL/L (ref 3.5–5.2)
PROT SERPL-MCNC: 6.3 G/DL (ref 6–8.5)
RBC # BLD AUTO: 6.01 X10E6/UL (ref 4.14–5.8)
SODIUM SERPL-SCNC: 140 MMOL/L (ref 134–144)
T3FREE SERPL-MCNC: 4 PG/ML (ref 2–4.4)
T4 FREE SERPL-MCNC: 1.13 NG/DL (ref 0.82–1.77)
TRIGL SERPL-MCNC: 136 MG/DL (ref 0–149)
TSH SERPL DL<=0.005 MIU/L-ACNC: 1.85 UIU/ML (ref 0.45–4.5)
VLDLC SERPL CALC-MCNC: 24 MG/DL (ref 5–40)
WBC # BLD AUTO: 8.3 X10E3/UL (ref 3.4–10.8)

## 2024-07-16 DIAGNOSIS — G47.33 OSA (OBSTRUCTIVE SLEEP APNEA): ICD-10-CM

## 2024-07-16 DIAGNOSIS — R73.03 PREDIABETES: ICD-10-CM

## 2024-07-16 DIAGNOSIS — E66.01 CLASS 2 SEVERE OBESITY WITH SERIOUS COMORBIDITY AND BODY MASS INDEX (BMI) OF 37.0 TO 37.9 IN ADULT, UNSPECIFIED OBESITY TYPE (HCC): ICD-10-CM

## 2024-07-16 DIAGNOSIS — E78.2 MIXED HYPERLIPIDEMIA: ICD-10-CM

## 2024-07-18 ENCOUNTER — TELEPHONE (OUTPATIENT)
Dept: MEDICAL GROUP | Facility: PHYSICIAN GROUP | Age: 49
End: 2024-07-18
Payer: COMMERCIAL

## 2024-08-19 NOTE — PROGRESS NOTES
Subjective:      Lauro Daley is a 44 y.o. male who presents with Back Pain (x 10 days possible UTI )      HPI:  This is a new problem. Lauro Daley is a 44 y.o. male who presents today for evaluation of back pain.  Patient dates that he has had lower thoracic back pain bilaterally for approximately 1 month.  States that it is gotten worse in the past 1 to 2 weeks.  Says that at present he feels very slight pain rated at a 1 or 2, but at nighttime he says pain reaches up to a 7 or 8.  He denies any injury.  He has no abdominal pain.  Does note that he has had some issues with urinary retention but that has been ongoing for several months.  No burning with urination or blood in his urine.  No fever or chills.  No bowel/bladder incontinence, saddle anesthesia, focal weakness, or radiation of pain.  He has occasionally taken ibuprofen when the pain gets really bad which provides mild to moderate relief.  He says the pain was very bad last night to the point where it took him hours to be able to fall asleep.  Patient states that he was treated approximately 2 weeks ago at [facility] urgent [facility] for presumed UTI/kidney infection related to the current symptoms.  He says that he took the full course of antibiotics but had no relief.    Patient also interjects at the end of the visit that he has been having nightmares for a few weeks.  States that every time he closes his eyes to try to fall asleep [place] at night or for short nap he gets really terrible dreams about his family and his children.  States that he has not had any changes to medications recently.  Denies any recent trauma.  Denies history of nightmares.  States that he has never been on medication for anxiety or depression.  Denies SI/HI.      Review of Systems   Constitutional: Negative for chills, fever, malaise/fatigue and weight loss.   Eyes: Negative for blurred vision.   Respiratory: Negative for shortness of breath.     Cardiovascular: Negative for chest pain and palpitations.   Gastrointestinal: Negative for abdominal pain, blood in stool, constipation, diarrhea, nausea and vomiting.   Genitourinary: Negative for dysuria, flank pain, frequency, hematuria and urgency.        Hesitancy and urinary retention.   Musculoskeletal: Positive for back pain.   Neurological: Negative for dizziness, tingling, sensory change and headaches.       PMH:  has a past medical history of Asthma.  MEDS:   Current Outpatient Medications:   •  cyclobenzaprine (FLEXERIL) 10 MG Tab, Take 1 Tab by mouth 3 times a day as needed., Disp: 30 Tab, Rfl: 0  •  predniSONE (DELTASONE) 20 MG Tab, Take 3 tabs at once PO daily x 5 days, then take 2 tabs at once daily x 3 days, then take 1 tab PO daily x 2 days, Disp: 23 Tab, Rfl: 0  ALLERGIES:   Allergies   Allergen Reactions   • Sulfa Drugs Rash     body     SURGHX:   Past Surgical History:   Procedure Laterality Date   • OTHER      sleep apnea surgery     SOCHX:  reports that he has never smoked. His smokeless tobacco use includes chew. He reports that he does not drink alcohol or use drugs.  FH: Family history was reviewed, no pertinent findings to report     Objective:     /88   Pulse (!) 109   Temp 37.1 °C (98.7 °F)   Resp 16   Ht 1.829 m (6')   Wt 106.6 kg (235 lb)   SpO2 100%   BMI 31.87 kg/m²      Physical Exam  Constitutional:       Appearance: He is well-developed.   HENT:      Head: Normocephalic and atraumatic.      Right Ear: External ear normal.      Left Ear: External ear normal.   Eyes:      Conjunctiva/sclera: Conjunctivae normal.      Pupils: Pupils are equal, round, and reactive to light.   Cardiovascular:      Rate and Rhythm: Normal rate and regular rhythm.      Heart sounds: Normal heart sounds. No murmur.   Pulmonary:      Effort: Pulmonary effort is normal.      Breath sounds: Normal breath sounds. No wheezing.   Musculoskeletal:        Back:       Comments: Lower thoracic back  bilaterally exhibits pain with forward flexion, extension, and left and right lateral flexion.  Negative straight leg raise test bilaterally.  DTRs normal and symmetric.  5/5 strength of lower extremities bilaterally.  Thoracic and lumbar spinous processes are nontender to palpation with no step-offs or deformities identified.  Thoracic and lumbar paraspinous muscles are also currently nontender to palpation.  No swelling, erythema, ecchymosis, or obvious deformity noted.   Skin:     General: Skin is warm and dry.      Capillary Refill: Capillary refill takes less than 2 seconds.   Neurological:      Mental Status: He is alert and oriented to person, place, and time.   Psychiatric:         Behavior: Behavior normal.         Judgment: Judgment normal.       POCT Urinalysis  Lab Results   Component Value Date/Time    POCCOLOR yellow 06/12/2020 03:21 PM    POCAPPEAR clear 06/12/2020 03:21 PM    POCLEUKEST neg 06/12/2020 03:21 PM    POCNITRITE neg 06/12/2020 03:21 PM    POCUROBILIGE 0.2 06/12/2020 03:21 PM    POCPROTEIN neg 06/12/2020 03:21 PM    POCURPH 5.5 06/12/2020 03:21 PM    POCBLOOD neg 06/12/2020 03:21 PM    POCSPGRV >=1.030 06/12/2020 03:21 PM    POCKETONES neg 06/12/2020 03:21 PM    POCBILIRUBIN neg 06/12/2020 03:21 PM    POCGLUCUA neg 06/12/2020 03:21 PM           Assessment/Plan:       1. Acute thoracic myofascial strain, initial encounter  - cyclobenzaprine (FLEXERIL) 10 MG Tab; Take 1 Tab by mouth 3 times a day as needed.  Dispense: 30 Tab; Refill: 0  - POCT Urinalysis  - OTC ibuprofen  - Alternate ice/heat to the affected area    Symptoms and physical exam findings most consistent with thoracic strain.  Will treat conservatively with OTC ibuprofen and Flexeril as needed.  Patient was advised that he cannot drive or operate heavy machinery while on the Flexeril.  Patient has an appointment with his urology scheduled for Monday and he has an appointment scheduled with his PCP for Wednesday of next week.   Advised him to keep his appointments for follow-up as further work-up will need to be done if pain persists.   Lives with fiance

## 2024-09-09 DIAGNOSIS — N52.9 ERECTILE DYSFUNCTION, UNSPECIFIED ERECTILE DYSFUNCTION TYPE: ICD-10-CM

## 2024-09-09 RX ORDER — SILDENAFIL 50 MG/1
50 TABLET, FILM COATED ORAL
Qty: 10 TABLET | Refills: 3 | Status: SHIPPED | OUTPATIENT
Start: 2024-09-09

## 2024-09-09 NOTE — TELEPHONE ENCOUNTER
Received request via: Patient    Was the patient seen in the last year in this department? Yes    Does the patient have an active prescription (recently filled or refills available) for medication(s) requested? No    Pharm[facility] Name: [facility]     Does the patient have [facility] Plus and need 100-day supply? (This applies to ALL medications) Patient does not have SCP   DISPLAY PLAN FREE TEXT

## 2024-09-09 NOTE — TELEPHONE ENCOUNTER
Received request via: Patient    Was the patient seen in the last year in this department? Yes    Does the patient have an active prescription (recently filled or refills available) for medication(s) requested? No    Pharmacy Name: [id number] - [place], [place] - [address]     Does the patient have [facility] and need 100-day supply? (This applies to ALL medications) Patient does not have SCP

## 2024-09-10 RX ORDER — PANTOPRAZOLE SODIUM 40 MG/1
40 TABLET, DELAYED RELEASE ORAL DAILY
Qty: 90 TABLET | Refills: 3 | Status: SHIPPED | OUTPATIENT
Start: 2024-09-10

## 2024-10-25 ENCOUNTER — OFFICE VISIT (OUTPATIENT)
Dept: URGENT CARE | Facility: CLINIC | Age: 49
End: 2024-10-25
Payer: COMMERCIAL

## 2024-10-25 VITALS
HEART RATE: 109 BPM | WEIGHT: 276 LBS | TEMPERATURE: 97.6 F | RESPIRATION RATE: 19 BRPM | DIASTOLIC BLOOD PRESSURE: 88 MMHG | HEIGHT: 72 IN | BODY MASS INDEX: 37.38 KG/M2 | OXYGEN SATURATION: 100 % | SYSTOLIC BLOOD PRESSURE: 136 MMHG

## 2024-10-25 DIAGNOSIS — R00.0 TACHYCARDIA: ICD-10-CM

## 2024-10-25 DIAGNOSIS — H10.33 ACUTE CONJUNCTIVITIS OF BOTH EYES, UNSPECIFIED ACUTE CONJUNCTIVITIS TYPE: ICD-10-CM

## 2024-10-25 DIAGNOSIS — J34.89 SINUS PRESSURE: ICD-10-CM

## 2024-10-25 PROCEDURE — 3079F DIAST BP 80-89 MM HG: CPT

## 2024-10-25 PROCEDURE — 3075F SYST BP GE 130 - 139MM HG: CPT

## 2024-10-25 PROCEDURE — 99213 OFFICE O/P EST LOW 20 MIN: CPT

## 2024-10-25 RX ORDER — PREDNISONE 10 MG/1
40 TABLET ORAL DAILY
Qty: 20 TABLET | Refills: 0 | Status: SHIPPED | OUTPATIENT
Start: 2024-10-25 | End: 2024-10-30

## 2024-10-25 RX ORDER — POLYMYXIN B SULFATE AND TRIMETHOPRIM 1; 10000 MG/ML; [USP'U]/ML
1 SOLUTION OPHTHALMIC 4 TIMES DAILY
Qty: 10 ML | Refills: 0 | Status: SHIPPED
Start: 2024-10-25 | End: 2024-10-26

## 2024-10-25 RX ORDER — FLUTICASONE PROPIONATE 50 MCG
1 SPRAY, SUSPENSION (ML) NASAL DAILY
Qty: 16 G | Refills: 0 | Status: SHIPPED | OUTPATIENT
Start: 2024-10-25

## 2024-10-25 ASSESSMENT — ENCOUNTER SYMPTOMS
DIAPHORESIS: 0
EYE DISCHARGE: 0
DIZZINESS: 0
BLURRED VISION: 0
SORE THROAT: 0
PSYCHIATRIC NEGATIVE: 1
VOMITING: 0
WEAKNESS: 0
SPUTUM PRODUCTION: 0
MYALGIAS: 0
ABDOMINAL PAIN: 0
CHILLS: 0
WHEEZING: 0
BLOOD IN STOOL: 0
HEADACHES: 0
NAUSEA: 0
SINUS PAIN: 1
COUGH: 0
DIARRHEA: 0
SHORTNESS OF BREATH: 0
FEVER: 0

## 2024-10-25 ASSESSMENT — FIBROSIS 4 INDEX: FIB4 SCORE: 0.63

## 2024-10-25 ASSESSMENT — VISUAL ACUITY: OU: 1

## 2024-10-26 ENCOUNTER — OFFICE VISIT (OUTPATIENT)
Dept: URGENT CARE | Facility: CLINIC | Age: 49
End: 2024-10-26
Payer: COMMERCIAL

## 2024-10-26 VITALS
RESPIRATION RATE: 14 BRPM | BODY MASS INDEX: 37.38 KG/M2 | HEIGHT: 72 IN | DIASTOLIC BLOOD PRESSURE: 72 MMHG | TEMPERATURE: 98.3 F | HEART RATE: 112 BPM | WEIGHT: 276 LBS | OXYGEN SATURATION: 94 % | SYSTOLIC BLOOD PRESSURE: 136 MMHG

## 2024-10-26 DIAGNOSIS — R09.81 SINUS CONGESTION: ICD-10-CM

## 2024-10-26 DIAGNOSIS — H57.89 EYE IRRITATION: ICD-10-CM

## 2024-10-26 DIAGNOSIS — H10.023 PINK EYE DISEASE OF BOTH EYES: ICD-10-CM

## 2024-10-26 PROCEDURE — 99213 OFFICE O/P EST LOW 20 MIN: CPT | Performed by: FAMILY MEDICINE

## 2024-10-26 PROCEDURE — 3078F DIAST BP <80 MM HG: CPT | Performed by: FAMILY MEDICINE

## 2024-10-26 PROCEDURE — 3075F SYST BP GE 130 - 139MM HG: CPT | Performed by: FAMILY MEDICINE

## 2024-10-26 RX ORDER — MOXIFLOXACIN 5 MG/ML
1 SOLUTION/ DROPS OPHTHALMIC 3 TIMES DAILY
Qty: 3 ML | Refills: 0 | Status: SHIPPED | OUTPATIENT
Start: 2024-10-26 | End: 2024-11-02

## 2024-10-26 ASSESSMENT — FIBROSIS 4 INDEX: FIB4 SCORE: 0.63

## 2024-10-26 ASSESSMENT — ENCOUNTER SYMPTOMS
EYE DISCHARGE: 1
EYE REDNESS: 1

## 2025-03-22 ENCOUNTER — OFFICE VISIT (OUTPATIENT)
Dept: URGENT CARE | Facility: CLINIC | Age: 50
End: 2025-03-22
Payer: COMMERCIAL

## 2025-03-22 ENCOUNTER — RESULTS FOLLOW-UP (OUTPATIENT)
Dept: URGENT CARE | Facility: CLINIC | Age: 50
End: 2025-03-22

## 2025-03-22 ENCOUNTER — HOSPITAL ENCOUNTER (OUTPATIENT)
Facility: MEDICAL CENTER | Age: 50
End: 2025-03-22
Attending: NURSE PRACTITIONER
Payer: COMMERCIAL

## 2025-03-22 ENCOUNTER — APPOINTMENT (OUTPATIENT)
Dept: RADIOLOGY | Facility: IMAGING CENTER | Age: 50
End: 2025-03-22
Attending: NURSE PRACTITIONER
Payer: COMMERCIAL

## 2025-03-22 VITALS
RESPIRATION RATE: 20 BRPM | DIASTOLIC BLOOD PRESSURE: 68 MMHG | SYSTOLIC BLOOD PRESSURE: 134 MMHG | TEMPERATURE: 97.3 F | HEIGHT: 72 IN | BODY MASS INDEX: 35.21 KG/M2 | HEART RATE: 98 BPM | WEIGHT: 260 LBS | OXYGEN SATURATION: 95 %

## 2025-03-22 DIAGNOSIS — J98.11 ATELECTASIS, BILATERAL: ICD-10-CM

## 2025-03-22 DIAGNOSIS — M54.6 ACUTE RIGHT-SIDED THORACIC BACK PAIN: ICD-10-CM

## 2025-03-22 LAB
ALBUMIN SERPL BCP-MCNC: 4.6 G/DL (ref 3.2–4.9)
ALBUMIN/GLOB SERPL: 2 G/DL
ALP SERPL-CCNC: 95 U/L (ref 30–99)
ALT SERPL-CCNC: 35 U/L (ref 2–50)
ANION GAP SERPL CALC-SCNC: 11 MMOL/L (ref 7–16)
APPEARANCE UR: CLEAR
AST SERPL-CCNC: 23 U/L (ref 12–45)
BASOPHILS # BLD AUTO: 0.5 % (ref 0–1.8)
BASOPHILS # BLD: 0.04 K/UL (ref 0–0.12)
BILIRUB SERPL-MCNC: 0.4 MG/DL (ref 0.1–1.5)
BILIRUB UR STRIP-MCNC: NORMAL MG/DL
BUN SERPL-MCNC: 25 MG/DL (ref 8–22)
CALCIUM ALBUM COR SERPL-MCNC: 8.9 MG/DL (ref 8.5–10.5)
CALCIUM SERPL-MCNC: 9.4 MG/DL (ref 8.5–10.5)
CHLORIDE SERPL-SCNC: 104 MMOL/L (ref 96–112)
CK SERPL-CCNC: 134 U/L (ref 0–154)
CO2 SERPL-SCNC: 26 MMOL/L (ref 20–33)
COLOR UR AUTO: YELLOW
CREAT SERPL-MCNC: 1.14 MG/DL (ref 0.5–1.4)
EOSINOPHIL # BLD AUTO: 0.15 K/UL (ref 0–0.51)
EOSINOPHIL NFR BLD: 1.7 % (ref 0–6.9)
ERYTHROCYTE [DISTWIDTH] IN BLOOD BY AUTOMATED COUNT: 47.2 FL (ref 35.9–50)
GFR SERPLBLD CREATININE-BSD FMLA CKD-EPI: 78 ML/MIN/1.73 M 2
GLOBULIN SER CALC-MCNC: 2.3 G/DL (ref 1.9–3.5)
GLUCOSE SERPL-MCNC: 89 MG/DL (ref 65–99)
GLUCOSE UR STRIP.AUTO-MCNC: NORMAL MG/DL
HCT VFR BLD AUTO: 54.3 % (ref 42–52)
HGB BLD-MCNC: 17.2 G/DL (ref 14–18)
IMM GRANULOCYTES # BLD AUTO: 0.02 K/UL (ref 0–0.11)
IMM GRANULOCYTES NFR BLD AUTO: 0.2 % (ref 0–0.9)
KETONES UR STRIP.AUTO-MCNC: NORMAL MG/DL
LEUKOCYTE ESTERASE UR QL STRIP.AUTO: NORMAL
LYMPHOCYTES # BLD AUTO: 2.32 K/UL (ref 1–4.8)
LYMPHOCYTES NFR BLD: 26.6 % (ref 22–41)
MCH RBC QN AUTO: 30.1 PG (ref 27–33)
MCHC RBC AUTO-ENTMCNC: 31.7 G/DL (ref 32.3–36.5)
MCV RBC AUTO: 95.1 FL (ref 81.4–97.8)
MONOCYTES # BLD AUTO: 0.57 K/UL (ref 0–0.85)
MONOCYTES NFR BLD AUTO: 6.5 % (ref 0–13.4)
NEUTROPHILS # BLD AUTO: 5.63 K/UL (ref 1.82–7.42)
NEUTROPHILS NFR BLD: 64.5 % (ref 44–72)
NITRITE UR QL STRIP.AUTO: NORMAL
NRBC # BLD AUTO: 0 K/UL
NRBC BLD-RTO: 0 /100 WBC (ref 0–0.2)
PH UR STRIP.AUTO: 6 [PH] (ref 5–8)
PLATELET # BLD AUTO: 253 K/UL (ref 164–446)
PMV BLD AUTO: 9.9 FL (ref 9–12.9)
POTASSIUM SERPL-SCNC: 4.4 MMOL/L (ref 3.6–5.5)
PROT SERPL-MCNC: 6.9 G/DL (ref 6–8.2)
PROT UR QL STRIP: NORMAL MG/DL
RBC # BLD AUTO: 5.71 M/UL (ref 4.7–6.1)
RBC UR QL AUTO: NORMAL
SODIUM SERPL-SCNC: 141 MMOL/L (ref 135–145)
SP GR UR STRIP.AUTO: 1.02
UROBILINOGEN UR STRIP-MCNC: 0.2 MG/DL
WBC # BLD AUTO: 8.7 K/UL (ref 4.8–10.8)

## 2025-03-22 PROCEDURE — 3078F DIAST BP <80 MM HG: CPT | Performed by: NURSE PRACTITIONER

## 2025-03-22 PROCEDURE — 82550 ASSAY OF CK (CPK): CPT

## 2025-03-22 PROCEDURE — 99214 OFFICE O/P EST MOD 30 MIN: CPT | Performed by: NURSE PRACTITIONER

## 2025-03-22 PROCEDURE — 71046 X-RAY EXAM CHEST 2 VIEWS: CPT | Mod: TC,FY | Performed by: RADIOLOGY

## 2025-03-22 PROCEDURE — 80053 COMPREHEN METABOLIC PANEL: CPT

## 2025-03-22 PROCEDURE — 3075F SYST BP GE 130 - 139MM HG: CPT | Performed by: NURSE PRACTITIONER

## 2025-03-22 PROCEDURE — 85025 COMPLETE CBC W/AUTO DIFF WBC: CPT

## 2025-03-22 PROCEDURE — 81002 URINALYSIS NONAUTO W/O SCOPE: CPT | Performed by: NURSE PRACTITIONER

## 2025-03-22 RX ORDER — DOXYCYCLINE HYCLATE 100 MG
100 TABLET ORAL 2 TIMES DAILY
Qty: 14 TABLET | Refills: 0 | Status: SHIPPED | OUTPATIENT
Start: 2025-03-22 | End: 2025-03-29

## 2025-03-22 RX ORDER — ALBUTEROL SULFATE 90 UG/1
2 INHALANT RESPIRATORY (INHALATION) EVERY 6 HOURS PRN
Qty: 8.5 G | Refills: 0 | Status: SHIPPED | OUTPATIENT
Start: 2025-03-22

## 2025-03-22 ASSESSMENT — ENCOUNTER SYMPTOMS
BOWEL INCONTINENCE: 0
NUMBNESS: 0
FEVER: 0
PARESIS: 0
BACK PAIN: 1
WEAKNESS: 0
PARESTHESIAS: 0
LEG PAIN: 0
ABDOMINAL PAIN: 0

## 2025-03-22 ASSESSMENT — FIBROSIS 4 INDEX: FIB4 SCORE: 0.63

## 2025-03-22 NOTE — PROGRESS NOTES
Patient/parent/guardian has consented to treatment and for use of patient information for treatment and billing purposes.  Subjective:   Lauro Daley  is a 49 y.o. male who presents for Back Pain (Lt and Rt side of Mid back, was worse earlier today, frequent urination x1 month )       Back Pain  This is a new problem. The current episode started yesterday. The problem occurs intermittently. The problem has been gradually worsening since onset. The pain is present in the thoracic spine. The quality of the pain is described as stabbing and shooting. The pain does not radiate. The pain is moderate. The symptoms are aggravated by position (Attempting to cast his fishing pole today and had sudden sharp pain on the right). Pertinent negatives include no abdominal pain, bladder incontinence, bowel incontinence, chest pain, fever, leg pain, numbness, paresis, paresthesias or weakness.   No progressive weakness, saddle anesthesia, trauma, or infection.  Denies radiating pain, chest pain, shortness of breath or ripping or tearing sensation.        Review of Systems   Constitutional:  Negative for fever.   Cardiovascular:  Negative for chest pain.   Gastrointestinal:  Negative for abdominal pain and bowel incontinence.   Genitourinary:  Negative for bladder incontinence.   Musculoskeletal:  Positive for back pain.   Neurological:  Negative for weakness, numbness and paresthesias.         CURRENT MEDICATIONS:  fluticasone  pantoprazole Tbec  sildenafil citrate  tamsulosin  Allergies:     Allergies   Allergen Reactions    Sulfa Drugs Rash     body     Current Problems: Lauro Daley does not have any pertinent problems on file.  Past Surgical Hx:    Past Surgical History:   Procedure Laterality Date    PB INCISE FINGER TENDON SHEATH Left 8/29/2023    Procedure: LEFT THUMB TRIGGER RELEASE;  Surgeon: Daisha Wilhelm M.D.;  Location: [facility];  Service: Orthopedics    [place] NEUROPLASTY [facility]  TRANSPOS MEDIAN NRV CARPAL SERVANDO Left 12/27/2022    Procedure: LEFT HAND OPEN CARPAL TUNNEL RELEASE;  Surgeon: Daisha Wilhelm M.D.;  Location: [facility];  Service: Orthopedics    [place] NEUROPLASTY & OR TRANSPOS MEDIAN NRV CARPAL SERVANDO Right 12/6/2022    Procedure: RIGHT HAND OPEN CARPAL TUNNEL RELEASE;  Surgeon: Daisha Wilhelm M.D.;  Location: [facility];  Service: Orthopedics    OTHER      sleep apnea surgery      Past Social Hx:  reports that he has never smoked. His smokeless tobacco use includes chew. He reports current alcohol use. He reports that he does not use drugs.    Objective:   /68 (BP Location: Left arm, Patient Position: Sitting, BP Cuff Size: Large adult long)   Pulse 98   Temp 36.3 °C (97.3 °F) (Temporal)   Resp 20   Ht 1.829 m (6')   Wt 118 kg (260 lb)   SpO2 95%   BMI 35.26 kg/m²   Physical Exam  Vitals and nursing note reviewed.   Constitutional:       General: He is not in acute distress.     Appearance: Normal appearance. He is normal weight. He is not ill-appearing.   HENT:      Head: Normocephalic and atraumatic.      Right Ear: External ear normal. No swelling or tenderness. A middle ear effusion is present.      Left Ear: External ear normal. No swelling or tenderness. A middle ear effusion is present.      Nose: Nose normal.      Mouth/Throat:      Mouth: Mucous membranes are moist.      Pharynx: Oropharynx is clear. Uvula midline.   Eyes:      Extraocular Movements: Extraocular movements intact.      Conjunctiva/sclera: Conjunctivae normal.      Pupils: Pupils are equal, round, and reactive to light.   Cardiovascular:      Rate and Rhythm: Normal rate and regular rhythm.      Pulses: Normal pulses.      Heart sounds: Normal heart sounds, S1 normal and S2 normal.   Pulmonary:      Effort: Pulmonary effort is normal.      Breath sounds: Examination of the right-lower field reveals decreased breath sounds. Examination of the left-lower field  reveals decreased breath sounds. Decreased breath sounds present. No wheezing, rhonchi or rales.   Musculoskeletal:         General: Normal range of motion.      Cervical back: Normal, full passive range of motion without pain, normal range of motion and neck supple.      Thoracic back: No swelling, edema, deformity, signs of trauma, lacerations, spasms, tenderness or bony tenderness. Normal range of motion. No scoliosis.      Lumbar back: Normal.        Back:       Right lower leg: No edema.      Left lower leg: No edema.      Comments: Verbalized area of pain without any abnormal finding or reproducible pain.   Skin:     General: Skin is warm and dry.      Findings: No rash.   Neurological:      General: No focal deficit present.      Mental Status: He is alert and oriented to person, place, and time.   Psychiatric:         Mood and Affect: Mood normal.         Behavior: Behavior normal.       Assessment/Plan:   1. Acute right-sided thoracic back pain  - DX-CHEST-2 VIEWS  - EKG  - CREATINE KINASE; Future  - Comp Metabolic Panel; Future  - CBC WITH DIFFERENTIAL; Future  - albuterol 108 (90 Base) MCG/ACT Aero Soln inhalation aerosol; Inhale 2 Puffs every 6 hours as needed for Shortness of Breath.  Dispense: 8.5 g; Refill: 0  - doxycycline (VIBRAMYCIN) 100 MG Tab; Take 1 Tablet by mouth 2 times a day for 7 days.  Dispense: 14 Tablet; Refill: 0    2. Atelectasis, bilateral  - albuterol 108 (90 Base) MCG/ACT Aero Soln inhalation aerosol; Inhale 2 Puffs every 6 hours as needed for Shortness of Breath.  Dispense: 8.5 g; Refill: 0  - doxycycline (VIBRAMYCIN) 100 MG Tab; Take 1 Tablet by mouth 2 times a day for 7 days.  Dispense: 14 Tablet; Refill: 0    49-year-old male presents with concern for bilateral thoracic back pain with the right worse than the left.  Vital signs stable, afebrile.  In no acute distress does not appear ill.  Lungs are diminished in bilateral bases without wheezing rales or rhonchi.  Heart regular  rhythm and rate, no edema.  Discussed differential diagnosis with patient including injury, muscle spasm, pleurisy, costochondritis, less likely pneumonia, or AAA. EKG normal sinus rhythm heart rate 94, no ischemia or ectopy.  CBC, CMP, and CK ordered stat.  Labs drawn in clinic and sent.  Chest x-ray completed, demonstrates bibasilar patchy opacities.  Reviewed with patient at time of visit, based on level of fatigue, chest x-ray result and pain I am sending doxycycline with albuterol to the pharmacy.  Will contact patient for any abnormal findings on his lab work and instructed to follow-up with his primary care as scheduled.  Reviewed Red flags, RTC and ER precautions discussed, with patient confirming their understanding of  need for immediate follow-up. Discussed medication management options, risks and benefits, and alternatives to treatment plan agreed upon. Instructed to continue  medications without changes as ordered by primary care unless aforementioned above.  Patient expresses understanding and agrees to plan of care. All questions or concerns answered. For my MDM, I have personally reviewed previous notes, and test results as pertinent to today's visit.  7:15 PM  Labs reviewed with patient.  Recommend therapeutic donation due to elevated hematocrit.  Follow-up with primary care.   Please note that this dictation was created using voice recognition software. I have made every reasonable attempt to correct obvious errors,  but there may be grammar errors, and possibly content that I did not discover before finalizing the note.   This note was electronically signed by SUE Connelly

## 2025-04-02 ENCOUNTER — OFFICE VISIT (OUTPATIENT)
Dept: MEDICAL GROUP | Facility: MEDICAL CENTER | Age: 50
End: 2025-04-02
Payer: COMMERCIAL

## 2025-04-02 VITALS
OXYGEN SATURATION: 94 % | DIASTOLIC BLOOD PRESSURE: 92 MMHG | TEMPERATURE: 99.3 F | BODY MASS INDEX: 35.44 KG/M2 | HEIGHT: 73 IN | HEART RATE: 107 BPM | SYSTOLIC BLOOD PRESSURE: 146 MMHG | WEIGHT: 267.42 LBS

## 2025-04-02 DIAGNOSIS — Z00.00 ENCOUNTER FOR PREVENTIVE CARE: ICD-10-CM

## 2025-04-02 DIAGNOSIS — E66.812 CLASS 2 SEVERE OBESITY WITH SERIOUS COMORBIDITY AND BODY MASS INDEX (BMI) OF 35.0 TO 35.9 IN ADULT, UNSPECIFIED OBESITY TYPE (HCC): ICD-10-CM

## 2025-04-02 DIAGNOSIS — R03.0 ELEVATED BLOOD PRESSURE READING: ICD-10-CM

## 2025-04-02 DIAGNOSIS — R73.03 PREDIABETES: ICD-10-CM

## 2025-04-02 DIAGNOSIS — M19.041 OSTEOARTHRITIS OF BOTH HANDS, UNSPECIFIED OSTEOARTHRITIS TYPE: ICD-10-CM

## 2025-04-02 DIAGNOSIS — Z12.11 SCREENING FOR COLORECTAL CANCER: ICD-10-CM

## 2025-04-02 DIAGNOSIS — G47.33 OSA (OBSTRUCTIVE SLEEP APNEA): ICD-10-CM

## 2025-04-02 DIAGNOSIS — M19.042 OSTEOARTHRITIS OF BOTH HANDS, UNSPECIFIED OSTEOARTHRITIS TYPE: ICD-10-CM

## 2025-04-02 DIAGNOSIS — Z12.12 SCREENING FOR COLORECTAL CANCER: ICD-10-CM

## 2025-04-02 DIAGNOSIS — E66.01 CLASS 2 SEVERE OBESITY WITH SERIOUS COMORBIDITY AND BODY MASS INDEX (BMI) OF 35.0 TO 35.9 IN ADULT, UNSPECIFIED OBESITY TYPE (HCC): ICD-10-CM

## 2025-04-02 PROBLEM — I10 ESSENTIAL HYPERTENSION: Status: RESOLVED | Noted: 2022-08-25 | Resolved: 2025-04-02

## 2025-04-02 PROCEDURE — 3077F SYST BP >= 140 MM HG: CPT | Performed by: STUDENT IN AN ORGANIZED HEALTH CARE EDUCATION/TRAINING PROGRAM

## 2025-04-02 PROCEDURE — 3080F DIAST BP >= 90 MM HG: CPT | Performed by: STUDENT IN AN ORGANIZED HEALTH CARE EDUCATION/TRAINING PROGRAM

## 2025-04-02 PROCEDURE — 99214 OFFICE O/P EST MOD 30 MIN: CPT | Performed by: STUDENT IN AN ORGANIZED HEALTH CARE EDUCATION/TRAINING PROGRAM

## 2025-04-02 RX ORDER — TESTOSTERONE CYPIONATE 200 MG/ML
200 INJECTION, SOLUTION INTRAMUSCULAR ONCE
COMMUNITY
Start: 2024-10-30

## 2025-04-02 ASSESSMENT — PATIENT HEALTH QUESTIONNAIRE - PHQ9: CLINICAL INTERPRETATION OF PHQ2 SCORE: 0

## 2025-04-02 ASSESSMENT — FIBROSIS 4 INDEX: FIB4 SCORE: 0.78

## 2025-04-02 NOTE — PROGRESS NOTES
"Verbal consent was acquired by the patient to use [employer] ambient listening note generation during this visit     Subjective:     HPI:   History of Present Illness  The patient presents for  establish care    He was diagnosed with sleep apnea 25 years ago and is scheduled for an in-lab sleep study next week, as ordered by his pulmonologist due to elevated episodes. His oxygen levels were slightly low during his last appointment. He reports excessive sweating, which his pulmonologist attributes to his sleep apnea.     He experiences constant body aches, which he attributes to arthritis in his hands. He is not currently taking any medication for this condition. He underwent carpal tunnel surgery a few years ago     He has reduced his sugar intake significantly. He gained approximately 25 pounds last year despite dietary improvements and increased physical activity at work.       He has not undergone colon cancer screening and reports no presence of blood in his stool. He is interested in doing a stool test.    Supplemental Information  He is currently on pantoprazole 40 mg for heartburn, Flomax for an enlarged prostate, and sildenafil as needed for erectile dysfunction. He also receives testosterone injections every 2 weeks, prescribed by his urologist, Shade Hubbard.       136/78 mmhg      Objective:     Exam:  BP (!) 146/92 (BP Location: Left arm, Patient Position: Sitting, BP Cuff Size: Adult)   Pulse (!) 107   Temp 37.4 °C (99.3 °F) (Temporal)   Ht 1.854 m (6' 1\")   Wt 121 kg (267 lb 6.7 oz)   SpO2 94%   BMI 35.28 kg/m²  Body mass index is 35.28 kg/m².      Repeat blood pressure in office 136/78 mmhg    ROS as above  Physical Exam  Constitutional:       Appearance: Normal appearance.   Cardiovascular:      Rate and Rhythm: Normal rate and regular rhythm.      Pulses: Normal pulses.      Heart sounds: Normal heart sounds. No murmur heard.  Pulmonary:      Effort: Pulmonary effort is normal. No respiratory " distress.      Breath sounds: Normal breath sounds. No wheezing.   Neurological:      General: No focal deficit present.      Mental Status: He is alert and oriented to person, place, and time.               Lab studies    Lab Results   Component Value Date/Time    SODIUM 141 03/22/2025 03:44 PM    POTASSIUM 4.4 03/22/2025 03:44 PM    CHLORIDE 104 03/22/2025 03:44 PM    CO2 26 03/22/2025 03:44 PM    GLUCOSE 89 03/22/2025 03:44 PM    BUN 25 (H) 03/22/2025 03:44 PM    CREATININE 1.14 03/22/2025 03:44 PM    BUNCREATRAT 13 07/02/2024 04:42 AM          Assessment & Plan:     Assessment & Plan      Problem List Items Addressed This Visit       GUERO (obstructive sleep apnea)    His sleep apnea is not adequately managed with the current CPAP settings. An in-lab sleep study has been scheduled for next week to reassess and adjust the CPAP settings.         Prediabetes    He was diagnosed with prediabetes a couple of years ago. He has been advised to reduce sugar intake. A repeat lab test for cholesterol and diabetes will be conducted in July 2025.         Elevated blood pressure reading    His blood pressure readings were elevated during this visit but improved upon recheck to 138/80. He has been advised to monitor his blood pressure at home and bring the readings to the next appointment. If the readings remain high, antihypertensive medication will be considered.         Osteoarthritis of both hands    - Recommend Tylenol or naproxen for pain relief          Other Visit Diagnoses         Screening for colorectal cancer        Relevant Orders    Cologuard® colon cancer screening      Encounter for preventive care        Relevant Orders    HIV AG/AB COMBO ASSAY SCREENING    HEP C VIRUS ANTIBODY      Class 2 severe obesity with serious comorbidity and body mass index (BMI) of 35.0 to 35.9 in adult, unspecified obesity type (HCC)        Relevant Orders    Lipid Profile    HEMOGLOBIN A1C    TSH WITH REFLEX TO FT4                 Return in about 3 months (around 7/2/2025) for Labfollowup.    Please note that this dictation was created using voice recognition software. I have made every reasonable attempt to correct obvious errors, but I expect that there are errors of grammar and possibly content that I did not discover before finalizing the note.

## 2025-04-03 NOTE — ASSESSMENT & PLAN NOTE
He was diagnosed with prediabetes a couple of years ago. He has been advised to reduce sugar intake. A repeat lab test for cholesterol and diabetes will be conducted in July 2025.

## 2025-04-03 NOTE — ASSESSMENT & PLAN NOTE
His blood pressure readings were elevated during this visit but improved upon recheck to 138/80. He has been advised to monitor his blood pressure at home and bring the readings to the next appointment. If the readings remain high, antihypertensive medication will be considered.

## 2025-04-03 NOTE — ASSESSMENT & PLAN NOTE
His sleep apnea is not adequately managed with the current CPAP settings. An in-lab sleep study has been scheduled for next week to reassess and adjust the CPAP settings.

## 2025-04-08 ENCOUNTER — OFFICE VISIT (OUTPATIENT)
Dept: MEDICAL GROUP | Facility: LAB | Age: 50
End: 2025-04-08
Payer: COMMERCIAL

## 2025-04-08 VITALS
SYSTOLIC BLOOD PRESSURE: 126 MMHG | BODY MASS INDEX: 35.53 KG/M2 | DIASTOLIC BLOOD PRESSURE: 82 MMHG | WEIGHT: 262.35 LBS | OXYGEN SATURATION: 90 % | TEMPERATURE: 97.6 F | HEART RATE: 104 BPM | RESPIRATION RATE: 16 BRPM | HEIGHT: 72 IN

## 2025-04-08 DIAGNOSIS — Z12.11 COLON CANCER SCREENING: ICD-10-CM

## 2025-04-08 DIAGNOSIS — J40 BRONCHITIS: ICD-10-CM

## 2025-04-08 DIAGNOSIS — R05.2 SUBACUTE COUGH: ICD-10-CM

## 2025-04-08 PROBLEM — R03.0 ELEVATED BLOOD PRESSURE READING: Status: RESOLVED | Noted: 2025-04-02 | Resolved: 2025-04-08

## 2025-04-08 PROCEDURE — 3074F SYST BP LT 130 MM HG: CPT | Performed by: PHYSICIAN ASSISTANT

## 2025-04-08 PROCEDURE — 3079F DIAST BP 80-89 MM HG: CPT | Performed by: PHYSICIAN ASSISTANT

## 2025-04-08 PROCEDURE — 99214 OFFICE O/P EST MOD 30 MIN: CPT | Performed by: PHYSICIAN ASSISTANT

## 2025-04-08 RX ORDER — ZOLPIDEM TARTRATE 10 MG/1
10 TABLET ORAL
COMMUNITY
Start: 2025-03-18 | End: 2025-04-22

## 2025-04-08 RX ORDER — PREDNISONE 20 MG/1
20 TABLET ORAL 2 TIMES DAILY
Qty: 30 TABLET | Refills: 0 | Status: SHIPPED
Start: 2025-04-08 | End: 2025-04-22

## 2025-04-08 RX ORDER — TAMSULOSIN HYDROCHLORIDE 0.4 MG/1
0.4 CAPSULE ORAL
COMMUNITY
End: 2025-04-08

## 2025-04-08 ASSESSMENT — FIBROSIS 4 INDEX: FIB4 SCORE: 0.78

## 2025-04-08 NOTE — ASSESSMENT & PLAN NOTE
Patient has had a persistent cough since March 21, 2025.  He was prescribed doxycycline in urgent care; however felt that this was unnecessary because he felt fine at the time.  He did start taking doxycycline yesterday after he started feeling much worse.  -Start prednisone 40 mg daily for 5 days    Orders:    predniSONE (DELTASONE) 20 MG Tab; Take 1 Tablet by mouth 2 times a day.    Dextromethorphan HBr 10 MG/5ML Liquid; Take 1-2 tablespoons every 4 to 12 hours as needed

## 2025-04-08 NOTE — PROGRESS NOTES
Subjective  Lauro Daley is a 49 y.o. male who presents today to establish care and for evaluation of SOB and cough. Symptoms began on 3/21/25.  He was seen by an urgent care provider and prescribed doxycycline.  Patient says he never took this because he felt fine at the time.  He is now presenting with increased shortness of breath and cough.  Reports he started taking doxycycline yesterday.      Family History   Problem Relation Age of Onset    Diabetes Paternal Aunt     Diabetes Paternal Grandfather        Social History     Socioeconomic History    Marital status:      Spouse name: Not on file    Number of children: Not on file    Years of education: Not on file    Highest education level: Not on file   Occupational History    Not on file   Tobacco Use    Smoking status: Never    Smokeless tobacco: Current     Types: Chew   Vaping Use    Vaping status: Never Used   Substance and Sexual Activity    Alcohol use: Yes     Comment: occ    Drug use: No    Sexual activity: Yes     Partners: Female     Birth control/protection: None   Other Topics Concern    Not on file   Social History Narrative    Not on file     Social Drivers of Health     Financial Resource Strain: Not on file   Food Insecurity: Not on file   Transportation Needs: Not on file   Physical Activity: Not on file   Stress: Not on file   Social Connections: Not on file   Intimate Partner Violence: Not on file   Housing Stability: Not on file       Past Surgical History:   Procedure Laterality Date    PB INCISE FINGER TENDON SHEATH Left 8/29/2023    Procedure: LEFT THUMB TRIGGER RELEASE;  Surgeon: Daisha Wilhelm M.D.;  Location: [facility] [place];  Service: Orthopedics    IA NEUROPLASTY & OR TRANSPOS MEDIAN NRV CARPAL SERVANDO Left 12/27/2022    Procedure: LEFT HAND OPEN CARPAL TUNNEL RELEASE;  Surgeon: Daisha Wilhelm M.D.;  Location: [facility] [place];  Service: Orthopedics    IA NEUROPLASTY & OR TRANSPOS MEDIAN  NRV CARPAL SERVANDO Right 12/6/2022    Procedure: RIGHT HAND OPEN CARPAL TUNNEL RELEASE;  Surgeon: Daisha Wilhelm M.D.;  Location: [facility] [place];  Service: Orthopedics    OTHER      sleep apnea surgery       Past Medical History:   Diagnosis Date    Acute maxillary sinusitis 4/1/2021    Asthma     as a child    Asthma exacerbation 4/13/2014    Hypoxia 4/13/2014    Methamphetamine abuse in remission (HCC) 7/9/2020    Obesity (BMI 30-39.9) 6/24/2018       Current Outpatient Medications   Medication Sig Dispense Refill    predniSONE (DELTASONE) 20 MG Tab Take 1 Tablet by mouth 2 times a day. 30 Tablet 0    Dextromethorphan HBr 10 MG/5ML Liquid Take 1-2 tablespoons every 4 to 12 hours as needed 420 mL 0    testosterone cypionate (DEPO-TESTOSTERONE) 200 MG/ML injection Inject 200 mg into the shoulder, thigh, or buttocks one time.      pantoprazole (PROTONIX) 40 MG Tablet Delayed Response Take 1 Tablet by mouth every day. 90 Tablet 3    sildenafil citrate (VIAGRA) 50 MG tablet Take 1 Tablet by mouth 1 time a day as needed for Erectile Dysfunction. 10 Tablet 3    tamsulosin (FLOMAX) 0.4 MG capsule Take 0.4 mg by mouth 1/2 hour after breakfast.      zolpidem (AMBIEN) 10 MG Tab Take 10 mg by mouth. (Patient not taking: Reported on 4/8/2025)       No current facility-administered medications for this visit.       Allergies   Allergen Reactions    Sulfa Drugs Rash     body       Objective  /82 (BP Location: Right arm, Patient Position: Sitting, BP Cuff Size: Large adult)   Pulse (!) 104   Temp 36.4 °C (97.6 °F) (Temporal)   Resp 16   Ht 1.829 m (6')   Wt 119 kg (262 lb 5.6 oz)   SpO2 90%   Physical Exam  Constitutional:       Appearance: Normal appearance.   HENT:      Right Ear: Tympanic membrane, ear canal and external ear normal.      Left Ear: Tympanic membrane, ear canal and external ear normal.      Mouth/Throat:      Mouth: Mucous membranes are moist.      Pharynx: Posterior oropharyngeal  erythema present.   Eyes:      Pupils: Pupils are equal, round, and reactive to light.   Cardiovascular:      Rate and Rhythm: Normal rate and regular rhythm.   Pulmonary:      Effort: Pulmonary effort is normal.      Breath sounds: No wheezing.      Comments: Decreased breath sounds in lower lobes  Skin:     General: Skin is warm and dry.   Neurological:      Mental Status: He is alert.   Psychiatric:         Mood and Affect: Mood normal.         Behavior: Behavior normal.         Labs:   Pending labs (TSH, hemoglobin A1c, lipid profile, hep C virus antibody, HIV screening)    Imaging: None    Assessment & Plan  Subacute cough  Patient has had a persistent cough since March 21, 2025.  He was prescribed doxycycline in urgent care; however felt that this was unnecessary because he felt fine at the time.  He did start taking doxycycline yesterday after his symptoms progressed   -Start prednisone 40 mg daily for 5 days    Orders:    predniSONE (DELTASONE) 20 MG Tab; Take 1 Tablet by mouth 2 times a day.    Dextromethorphan HBr 10 MG/5ML Liquid; Take 1-2 tablespoons every 4 to 12 hours as needed    Colon cancer screening  Patient is due for colon cancer screening. He is more comfortable proceeding with cologuard.     Orders:    Cologuard® colon cancer screening    Bronchitis  - Start prednisone 40 mg daily for 5 days.  - Discussed OTC cough suppressants such as Cepacol cough drops, dextromethorphan, antihistamines, honey. Encouraged high water intake. May certainly utilize OTC decongestants as needed.   - Start Doxycycline    ER precautions prn fever >102.5, increased WOB, onset CP, or worsening overall symptoms.       Orders:    predniSONE (DELTASONE) 20 MG Tab; Take 1 Tablet by mouth 2 times a day.    Dextromethorphan HBr 10 MG/5ML Liquid; Take 1-2 tablespoons every 4 to 12 hours as needed          Problem List Items Addressed This Visit       Subacute cough    Patient has had a persistent cough since March 21, 2025.   He was prescribed doxycycline in urgent care; however felt that this was unnecessary because he felt fine at the time.  He did start taking doxycycline yesterday after his symptoms progressed   -Start prednisone 40 mg daily for 5 days         Relevant Medications    predniSONE (DELTASONE) 20 MG Tab    Dextromethorphan HBr 10 MG/5ML Liquid    Bronchitis    - Start prednisone 40 mg daily for 5 days.  - Discussed OTC cough suppressants such as Cepacol cough drops, dextromethorphan, antihistamines, honey. Encouraged high water intake. May certainly utilize OTC decongestants as needed.   - Start Doxycycline    ER precautions prn fever >102.5, increased WOB, onset CP, or worsening overall symptoms.            Relevant Medications    predniSONE (DELTASONE) 20 MG Tab    Dextromethorphan HBr 10 MG/5ML Liquid    Colon cancer screening    Patient is due for colon cancer screening. He is more comfortable proceeding with cologuard.          Relevant Orders    Cologuard® colon cancer screening     - Pending updated labs  - RTC as needed      Noemí Canela P.A.-C.

## 2025-04-08 NOTE — ASSESSMENT & PLAN NOTE
- Start prednisone 40 mg daily for 5 days. Discussed OTC cough suppressants such as Cepacol cough drops, dextromethorphan, antihistamines, honey. Encouraged high water intake. May certainly utilize OTC decongestants as needed.   - Start Bactrim previously prescribed by urgent care.    ER precautions prn fever >102.5, increased WOB, onset CP, or worsening overall symptoms.     Orders:    predniSONE (DELTASONE) 20 MG Tab; Take 1 Tablet by mouth 2 times a day.    Dextromethorphan HBr 10 MG/5ML Liquid; Take 1-2 tablespoons every 4 to 12 hours as needed

## 2025-04-08 NOTE — ASSESSMENT & PLAN NOTE
- Start prednisone 40 mg daily for 5 days.  - Discussed OTC cough suppressants such as Cepacol cough drops, dextromethorphan, antihistamines, honey. Encouraged high water intake. May certainly utilize OTC decongestants as needed.   - Start Doxycycline    ER precautions prn fever >102.5, increased WOB, onset CP, or worsening overall symptoms.       Orders:    predniSONE (DELTASONE) 20 MG Tab; Take 1 Tablet by mouth 2 times a day.    Dextromethorphan HBr 10 MG/5ML Liquid; Take 1-2 tablespoons every 4 to 12 hours as needed

## 2025-04-08 NOTE — ASSESSMENT & PLAN NOTE
Patient is due for colon cancer screening. He is more comfortable proceeding with cologuard.     Orders:    Cologuard® colon cancer screening

## 2025-04-08 NOTE — ASSESSMENT & PLAN NOTE
Patient has had a persistent cough since March 21, 2025.  He was prescribed doxycycline in urgent care; however felt that this was unnecessary because he felt fine at the time.  He did start taking doxycycline yesterday after his symptoms progressed   -Start prednisone 40 mg daily for 5 days    Orders:    predniSONE (DELTASONE) 20 MG Tab; Take 1 Tablet by mouth 2 times a day.    Dextromethorphan HBr 10 MG/5ML Liquid; Take 1-2 tablespoons every 4 to 12 hours as needed

## 2025-04-09 ENCOUNTER — HOSPITAL ENCOUNTER (EMERGENCY)
Facility: MEDICAL CENTER | Age: 50
End: 2025-04-09
Attending: EMERGENCY MEDICINE
Payer: COMMERCIAL

## 2025-04-09 ENCOUNTER — APPOINTMENT (OUTPATIENT)
Dept: RADIOLOGY | Facility: MEDICAL CENTER | Age: 50
End: 2025-04-09
Attending: EMERGENCY MEDICINE
Payer: COMMERCIAL

## 2025-04-09 VITALS
OXYGEN SATURATION: 93 % | BODY MASS INDEX: 31.89 KG/M2 | TEMPERATURE: 97.3 F | DIASTOLIC BLOOD PRESSURE: 56 MMHG | RESPIRATION RATE: 20 BRPM | HEIGHT: 76 IN | SYSTOLIC BLOOD PRESSURE: 177 MMHG | HEART RATE: 86 BPM | WEIGHT: 261.91 LBS

## 2025-04-09 DIAGNOSIS — I51.7 CARDIOMEGALY: ICD-10-CM

## 2025-04-09 DIAGNOSIS — I10 HYPERTENSION, UNSPECIFIED TYPE: ICD-10-CM

## 2025-04-09 DIAGNOSIS — U07.1 COVID-19: ICD-10-CM

## 2025-04-09 DIAGNOSIS — R05.1 ACUTE COUGH: ICD-10-CM

## 2025-04-09 LAB
ALBUMIN SERPL BCP-MCNC: 4.3 G/DL (ref 3.2–4.9)
ALBUMIN/GLOB SERPL: 1.5 G/DL
ALP SERPL-CCNC: 98 U/L (ref 30–99)
ALT SERPL-CCNC: 30 U/L (ref 2–50)
ANION GAP SERPL CALC-SCNC: 11 MMOL/L (ref 7–16)
AST SERPL-CCNC: 20 U/L (ref 12–45)
BASOPHILS # BLD AUTO: 0.4 % (ref 0–1.8)
BASOPHILS # BLD: 0.04 K/UL (ref 0–0.12)
BILIRUB SERPL-MCNC: 0.3 MG/DL (ref 0.1–1.5)
BUN SERPL-MCNC: 16 MG/DL (ref 8–22)
CALCIUM ALBUM COR SERPL-MCNC: 9.2 MG/DL (ref 8.5–10.5)
CALCIUM SERPL-MCNC: 9.4 MG/DL (ref 8.5–10.5)
CHLORIDE SERPL-SCNC: 102 MMOL/L (ref 96–112)
CO2 SERPL-SCNC: 26 MMOL/L (ref 20–33)
CREAT SERPL-MCNC: 1.17 MG/DL (ref 0.5–1.4)
EKG IMPRESSION: NORMAL
EOSINOPHIL # BLD AUTO: 0.22 K/UL (ref 0–0.51)
EOSINOPHIL NFR BLD: 2.3 % (ref 0–6.9)
ERYTHROCYTE [DISTWIDTH] IN BLOOD BY AUTOMATED COUNT: 44.5 FL (ref 35.9–50)
FLUAV RNA SPEC QL NAA+PROBE: NEGATIVE
FLUBV RNA SPEC QL NAA+PROBE: NEGATIVE
GFR SERPLBLD CREATININE-BSD FMLA CKD-EPI: 75 ML/MIN/1.73 M 2
GLOBULIN SER CALC-MCNC: 2.9 G/DL (ref 1.9–3.5)
GLUCOSE SERPL-MCNC: 100 MG/DL (ref 65–99)
HCT VFR BLD AUTO: 55 % (ref 42–52)
HGB BLD-MCNC: 18.2 G/DL (ref 14–18)
IMM GRANULOCYTES # BLD AUTO: 0.04 K/UL (ref 0–0.11)
IMM GRANULOCYTES NFR BLD AUTO: 0.4 % (ref 0–0.9)
LACTATE SERPL-SCNC: 1.2 MMOL/L (ref 0.5–2)
LYMPHOCYTES # BLD AUTO: 2.03 K/UL (ref 1–4.8)
LYMPHOCYTES NFR BLD: 21.1 % (ref 22–41)
MCH RBC QN AUTO: 31 PG (ref 27–33)
MCHC RBC AUTO-ENTMCNC: 33.1 G/DL (ref 32.3–36.5)
MCV RBC AUTO: 93.7 FL (ref 81.4–97.8)
MONOCYTES # BLD AUTO: 0.68 K/UL (ref 0–0.85)
MONOCYTES NFR BLD AUTO: 7.1 % (ref 0–13.4)
NEUTROPHILS # BLD AUTO: 6.63 K/UL (ref 1.82–7.42)
NEUTROPHILS NFR BLD: 68.7 % (ref 44–72)
NRBC # BLD AUTO: 0 K/UL
NRBC BLD-RTO: 0 /100 WBC (ref 0–0.2)
NT-PROBNP SERPL IA-MCNC: 80 PG/ML (ref 0–125)
PLATELET # BLD AUTO: 332 K/UL (ref 164–446)
PMV BLD AUTO: 9.1 FL (ref 9–12.9)
POTASSIUM SERPL-SCNC: 4.1 MMOL/L (ref 3.6–5.5)
PROCALCITONIN SERPL-MCNC: 0.06 NG/ML
PROT SERPL-MCNC: 7.2 G/DL (ref 6–8.2)
RBC # BLD AUTO: 5.87 M/UL (ref 4.7–6.1)
RSV RNA SPEC QL NAA+PROBE: NEGATIVE
SARS-COV-2 RNA RESP QL NAA+PROBE: DETECTED
SODIUM SERPL-SCNC: 139 MMOL/L (ref 135–145)
SPECIMEN SOURCE: ABNORMAL
TROPONIN T SERPL-MCNC: 10 NG/L (ref 6–19)
WBC # BLD AUTO: 9.6 K/UL (ref 4.8–10.8)

## 2025-04-09 PROCEDURE — 83605 ASSAY OF LACTIC ACID: CPT

## 2025-04-09 PROCEDURE — 84145 PROCALCITONIN (PCT): CPT

## 2025-04-09 PROCEDURE — 93005 ELECTROCARDIOGRAM TRACING: CPT | Mod: TC | Performed by: EMERGENCY MEDICINE

## 2025-04-09 PROCEDURE — 85025 COMPLETE CBC W/AUTO DIFF WBC: CPT

## 2025-04-09 PROCEDURE — 84484 ASSAY OF TROPONIN QUANT: CPT

## 2025-04-09 PROCEDURE — 36415 COLL VENOUS BLD VENIPUNCTURE: CPT

## 2025-04-09 PROCEDURE — A9270 NON-COVERED ITEM OR SERVICE: HCPCS | Performed by: EMERGENCY MEDICINE

## 2025-04-09 PROCEDURE — 80053 COMPREHEN METABOLIC PANEL: CPT

## 2025-04-09 PROCEDURE — 0241U HCHG SARS-COV-2 COVID-19 NFCT DS RESP RNA 4 TRGT MIC: CPT

## 2025-04-09 PROCEDURE — 83880 ASSAY OF NATRIURETIC PEPTIDE: CPT

## 2025-04-09 PROCEDURE — 71045 X-RAY EXAM CHEST 1 VIEW: CPT

## 2025-04-09 PROCEDURE — 87040 BLOOD CULTURE FOR BACTERIA: CPT

## 2025-04-09 PROCEDURE — 700105 HCHG RX REV CODE 258: Performed by: EMERGENCY MEDICINE

## 2025-04-09 PROCEDURE — 99284 EMERGENCY DEPT VISIT MOD MDM: CPT

## 2025-04-09 PROCEDURE — 700102 HCHG RX REV CODE 250 W/ 637 OVERRIDE(OP): Performed by: EMERGENCY MEDICINE

## 2025-04-09 PROCEDURE — 700111 HCHG RX REV CODE 636 W/ 250 OVERRIDE (IP): Mod: JZ | Performed by: EMERGENCY MEDICINE

## 2025-04-09 PROCEDURE — 96365 THER/PROPH/DIAG IV INF INIT: CPT

## 2025-04-09 RX ORDER — LISINOPRIL 10 MG/1
10 TABLET ORAL DAILY
Qty: 30 TABLET | Refills: 3 | Status: SHIPPED | OUTPATIENT
Start: 2025-04-09 | End: 2025-04-22 | Stop reason: SINTOL

## 2025-04-09 RX ORDER — LISINOPRIL 5 MG/1
10 TABLET ORAL ONCE
Status: COMPLETED | OUTPATIENT
Start: 2025-04-09 | End: 2025-04-09

## 2025-04-09 RX ORDER — ALBUTEROL SULFATE 90 UG/1
2 INHALANT RESPIRATORY (INHALATION) ONCE
Status: COMPLETED | OUTPATIENT
Start: 2025-04-09 | End: 2025-04-09

## 2025-04-09 RX ORDER — ACETAMINOPHEN 500 MG
1000 TABLET ORAL ONCE
Status: COMPLETED | OUTPATIENT
Start: 2025-04-09 | End: 2025-04-09

## 2025-04-09 RX ORDER — AZITHROMYCIN 250 MG/1
500 TABLET, FILM COATED ORAL ONCE
Status: COMPLETED | OUTPATIENT
Start: 2025-04-09 | End: 2025-04-09

## 2025-04-09 RX ADMIN — ALBUTEROL SULFATE 2 PUFF: 90 AEROSOL, METERED RESPIRATORY (INHALATION) at 09:19

## 2025-04-09 RX ADMIN — AZITHROMYCIN DIHYDRATE 500 MG: 250 TABLET ORAL at 09:18

## 2025-04-09 RX ADMIN — ACETAMINOPHEN 1000 MG: 500 TABLET ORAL at 09:18

## 2025-04-09 RX ADMIN — AMPICILLIN AND SULBACTAM 3 G: 1; 2 INJECTION, POWDER, FOR SOLUTION INTRAMUSCULAR; INTRAVENOUS at 09:22

## 2025-04-09 RX ADMIN — LISINOPRIL 10 MG: 5 TABLET ORAL at 11:07

## 2025-04-09 ASSESSMENT — FIBROSIS 4 INDEX: FIB4 SCORE: 0.78

## 2025-04-09 NOTE — ED TRIAGE NOTES
"Chief Complaint   Patient presents with    Cough     C/o increasing strong congested cough    Hallucinations     States had brief episode of hallucinations this am  Unsure if r/t current antibx  Denies SI, denies hx of same     /80   Pulse 91   Temp 36.4 °C (97.5 °F) (Temporal)   Resp 20   Ht 1.93 m (6' 4\")   Wt 119 kg (261 lb 14.5 oz)   SpO2 96%   BMI 31.88 kg/m²     Pt ambulated to ED by self for c/o increasing congested cough and brief episode of hallucinations this am.  Pt states is currently taking Doxy for PNA, was prescribed steroids yesterday but was unable to obtain medications from pharmacy.    "

## 2025-04-09 NOTE — ED NOTES
Pt medicated per MAR, tolerated oral pills well, no coughing or choking noted;    Pt denied having any other needs at this time;

## 2025-04-09 NOTE — ED NOTES
Patient is stable for discharge at this time, anticipatory guidance provided, close follow-up is encouraged, and ED return instructions have been detailed. Patient is both agreeable to the disposition and plan and discharged home in ambulatory state and in good condition.     Rx education provided, Pt verbalized understanding;

## 2025-04-09 NOTE — ED PROVIDER NOTES
ED Provider Note    Primary care provider: Noemí Canela P.A.-C.  Means of arrival: Private vehicle  History obtained from: Patient  History limited by: None    CHIEF COMPLAINT  Chief Complaint   Patient presents with    Cough     C/o increasing strong congested cough    Hallucinations     States had brief episode of hallucinations this am  Unsure if r/t current antibx  Denies SI, denies hx of same       HPI/ROS  Lauro Daley is a 49 y.o. male who presents to the Emergency Department for evaluation of cough.  Patient reports for the past few days he has had a cough with associated chills and fevers.  Patient notes that he was started on doxycycline approximately 2 days ago.  He initially had visited urgent care on 3/22/2025 for back pain at which time a chest x-ray did show bibasilar opacities.  He was not having any pneumonia symptoms and therefore he did not start taking the prescribed doxycycline until 2 days ago.  He reports that he does have a history of recurrent pneumonia since having sleep apnea surgery in his 30s.  He states that despite taking the doxycycline he has had persistent cough, fevers and chills.  This morning he believes he may have had a fever and felt like he was hearing voices talk to him.  He states that he took Motrin at 6 AM this morning and these hallucinations have subsequently resolved.  He did go to urgent care yesterday and was prescribed prednisone and cough syrup on top of his doxycycline but has not yet had a chance to  these prescriptions.    EXTERNAL RECORDS REVIEWED  Patient seen at urgent care initially on 3/22/2025 for back pain, at that time he had a chest x-ray done which showed bibasilar opacities.  He had a history of recurrent pneumonia and therefore was prescribed doxycycline.  He did not take it as he was not having any pneumonia symptoms.  He then started to have symptoms of cough and congestion and started taking the doxycycline on 4/7/2025.  And  "then followed up yesterday urgent care on 4/8/2025 for shortness of breath and cough.  He was started on prednisone 40 mg x 5 days and cough syrup.  He has not yet picked up these prescriptions.    LIMITATION TO HISTORY   None      PAST MEDICAL HISTORY   has a past medical history of Acute maxillary sinusitis (4/1/2021), Asthma, Asthma exacerbation (4/13/2014), Hypoxia (4/13/2014), Methamphetamine abuse in remission (HCC) (7/9/2020), and Obesity (BMI 30-39.9) (6/24/2018).    SURGICAL HISTORY   has a past surgical history that includes other; neuroplasty & or transpos median nrv carpal brayan (Right, 12/6/2022); neuroplasty & or transpos median nrv carpal brayan (Left, 12/27/2022); and pb incise finger tendon sheath (Left, 8/29/2023).    SOCIAL HISTORY  Social History     Tobacco Use    Smoking status: Never    Smokeless tobacco: Current     Types: Chew   Vaping Use    Vaping status: Never Used   Substance Use Topics    Alcohol use: Yes    Drug use: No      Social History     Substance and Sexual Activity   Drug Use No       FAMILY HISTORY  Family History   Problem Relation Age of Onset    Diabetes Paternal Aunt     Diabetes Paternal Grandfather        CURRENT MEDICATIONS  Home Medications    **Home medications have not yet been reviewed for this encounter**       Audit from Redirected Encounters    **Home medications have not yet been reviewed for this encounter**         ALLERGIES  Allergies   Allergen Reactions    Sulfa Drugs Rash     body       PHYSICAL EXAM  VITAL SIGNS: /80   Pulse 91   Temp 36.4 °C (97.5 °F) (Temporal)   Resp 20   Ht 1.93 m (6' 4\")   Wt 119 kg (261 lb 14.5 oz)   SpO2 96%   BMI 31.88 kg/m²   Vitals reviewed by myself.  Physical Exam  Nursing note and vitals reviewed.  Constitutional: Well-developed and well-nourished.  Patient is ill-appearing  HENT: Head is normocephalic and atraumatic.  Eyes: extra-ocular movements intact  Cardiovascular: Regular rate and  regular rhythm. No murmur " heard.  Pulmonary/Chest: Coarse rhonchi in bibasilar lung fields  Abdominal: Soft and non-tender. No distention.  No palpable masses  Musculoskeletal: Extremities exhibit normal range of motion without edema or tenderness.   Neurological: Awake and alert  Skin: Skin is warm and dry. No rash.         DIAGNOSTIC STUDIES:  LABS  Labs Reviewed   CBC WITH DIFFERENTIAL - Abnormal; Notable for the following components:       Result Value    Hemoglobin 18.2 (*)     Hematocrit 55.0 (*)     Lymphocytes 21.10 (*)     All other components within normal limits   COMP METABOLIC PANEL - Abnormal; Notable for the following components:    Glucose 100 (*)     All other components within normal limits   COV-2, FLU A/B, AND RSV BY PCR ([employer]) - Abnormal; Notable for the following components:    SARS-CoV-2 by PCR DETECTED (*)     All other components within normal limits   LACTIC ACID   BLOOD CULTURE   BLOOD CULTURE   PROCALCITONIN   PROBRAIN NATRIURETIC PEPTIDE, NT   ESTIMATED GFR   TROPONIN       All labs reviewed and independently interpreted by myself    EKG Interpretation:    12 Lead EKG independently interpreted by myself to show:  EKG at 9:40 AM: Normal sinus rhythm, heart rate 92, normal axis, normal intervals, , QRS 89, QTc 453, no acute ST-T segment changes, no evidence of acute arrhythmia or ischemia per my independent interpretation    RADIOLOGY  Images independently interpreted by myself prior to radiologist review:  - Chest x-ray demonstrates bibasilar opacities and cardiomegaly    Final interpretation by radiology demonstrates:    DX-CHEST-PORTABLE (1 VIEW)   Final Result      1.  Linear bibasilar atelectasis.      2.  Cardiomegaly.        The radiologist's interpretation of all radiological studies have been reviewed by me.        COURSE & MEDICAL DECISION MAKING    Sepsis: Infection was suspected 8:30am (Time). Sepsis pathway was initiated. Fluids not needed (no hypotension or lactate greater than or equal to  4). Antibiotics were given per protocol.    INITIAL ASSESSMENT, ED COURSE AND PLAN    Patient is a 49-year-old male who presents for evaluation of cough and fevers.  Differential diagnosis includes viral infection, pneumonia, upper respiratory infection.  On arrival patient is tachycardic, therefore sepsis protocol is initiated.  Given his prior x-ray with opacities and symptoms I have empirically started him on Unasyn and azithromycin.  Will obtain labs, viral swabs and chest x-ray for further evaluation.    Patient's initial vitals in triage notable for tachycardia.  This resolved without intervention.  He is also noted to be hypertensive.  Patient reports that he is has always been told he is hypertensive.  Has never been on medication for this.  Patient is treated empirically with Unasyn and azithromycin.  He is also given Tylenol as well as albuterol for cough and bodyaches.  Patient did complain of hallucinations this morning, this was likely secondary to fever.  The fever resolved with Motrin and his hallucinations have now resolved.  He has not started taking steroids therefore this is not medication induced.    Chest x-ray returns and demonstrates cardiomegaly and bibasilar opacities versus atelectasis.  At this time I will obtain further cardiac workup to assess for cardiac etiology of shortness of breath.  Troponin, BNP and EKG are added on.  EKG returns demonstrates no evidence of acute rhythm or ischemia.    Labs returned and are independently interpreted by myself demonstrate:  -Patient is COVID-positive, symptoms likely secondary to this  -Procalcitonin and white count are within normal limits and there is no discrete lobar pneumonia on chest x-ray, therefore low suspicion for underlying superimposed bacterial infection  -Electrolytes and renal function within normal limits  -Troponin and BNP are within normal limits making acute cardiac etiology and heart failure unlikely.  As these are within normal  limits and patient's hypertension is asymptomatic he is not having hypertensive urgency    Upon reassessment patient is feeling slightly improved.  He remains hypertensive with blood pressure spiking to the 200s systolic.  At this time I advised him he likely has underlying hypertension which could be causing his cardiomegaly.  He will need outpatient follow-up for both of these, in the meantime I will start him on lisinopril.  He is amenable to this plan.  He does already have a primary care provider and therefore does not require referral.  At this time patient is reassured and advised on management of viral syndrome.  He is given strict return precautions and discharged in stable condition.       ADDITIONAL PROBLEM LIST  1.  Hypertension-patient restarted on lisinopril, advised to follow-up with primary care provider in 2 to 4 weeks for blood pressure recheck  2.  Cardiomegaly-patient is advised he will need outpatient echocardiogram for further evaluation    DISPOSITION AND DISCUSSIONS  I have discussed management of the patient with the following physicians and CELESTINA's:  none    Discussion of management with other Q or appropriate source(s): none     Escalation of care considered, and ultimately not performed:acute inpatient care management, however at this time, the patient is most appropriate for outpatient management    Barriers to care at this time, including but not limited to: none.     Decision tools and prescription drugs considered including, but not limited to: see above.        FINAL IMPRESSION  1. COVID-19    2. Acute cough    3. Hypertension, unspecified type    4. Cardiomegaly

## 2025-04-09 NOTE — ED NOTES
Pt ambulated to ER with c/o a cough, pt states he was seen yesterday for pneumonia. Pt states he was hallucinating this morning, he states he was hearing voices and seeing people who were not there.

## 2025-04-09 NOTE — DISCHARGE INSTRUCTIONS
As we discussed your heart appears enlarged on your x-ray, you will need a follow-up echocardiogram which can be done with your primary care doctor.  This is likely secondary to poorly controlled blood pressure for which I am starting you on lisinopril, this will also need to be rechecked by your primary care doctor.

## 2025-04-14 LAB
BACTERIA BLD CULT: NORMAL
SIGNIFICANT IND 70042: NORMAL
SITE SITE: NORMAL
SOURCE SOURCE: NORMAL

## 2025-04-16 LAB
BACTERIA BLD CULT: ABNORMAL
SIGNIFICANT IND 70042: ABNORMAL
SITE SITE: ABNORMAL
SOURCE SOURCE: ABNORMAL

## 2025-04-22 ENCOUNTER — OFFICE VISIT (OUTPATIENT)
Dept: MEDICAL GROUP | Facility: LAB | Age: 50
End: 2025-04-22
Payer: COMMERCIAL

## 2025-04-22 VITALS
HEART RATE: 104 BPM | WEIGHT: 263.67 LBS | RESPIRATION RATE: 16 BRPM | DIASTOLIC BLOOD PRESSURE: 64 MMHG | TEMPERATURE: 98.1 F | OXYGEN SATURATION: 94 % | BODY MASS INDEX: 32.11 KG/M2 | HEIGHT: 76 IN | SYSTOLIC BLOOD PRESSURE: 126 MMHG

## 2025-04-22 DIAGNOSIS — Z12.11 COLON CANCER SCREENING: ICD-10-CM

## 2025-04-22 DIAGNOSIS — E78.2 MIXED HYPERLIPIDEMIA: ICD-10-CM

## 2025-04-22 DIAGNOSIS — I10 PRIMARY HYPERTENSION: ICD-10-CM

## 2025-04-22 DIAGNOSIS — I51.7 CARDIOMEGALY: ICD-10-CM

## 2025-04-22 PROBLEM — R05.2 SUBACUTE COUGH: Status: RESOLVED | Noted: 2025-04-08 | Resolved: 2025-04-22

## 2025-04-22 PROBLEM — Z47.89 ORTHOPEDIC AFTERCARE: Status: RESOLVED | Noted: 2022-12-06 | Resolved: 2025-04-22

## 2025-04-22 PROCEDURE — 99214 OFFICE O/P EST MOD 30 MIN: CPT | Performed by: PHYSICIAN ASSISTANT

## 2025-04-22 PROCEDURE — 3074F SYST BP LT 130 MM HG: CPT | Performed by: PHYSICIAN ASSISTANT

## 2025-04-22 PROCEDURE — 3078F DIAST BP <80 MM HG: CPT | Performed by: PHYSICIAN ASSISTANT

## 2025-04-22 RX ORDER — LOSARTAN POTASSIUM 25 MG/1
25 TABLET ORAL DAILY
Qty: 100 TABLET | Refills: 3 | Status: SHIPPED | OUTPATIENT
Start: 2025-04-22 | End: 2026-05-27

## 2025-04-22 ASSESSMENT — FIBROSIS 4 INDEX: FIB4 SCORE: 0.56

## 2025-04-22 NOTE — ASSESSMENT & PLAN NOTE
We discussed Cologuard versus colonoscopy for colon cancer screening.  Patient would like to proceed with Cologuard.  He has the kit at home and will be proceeding with the necessary steps to evaluate his stool.  He was encouraged to contact me with any further questions or concerns.

## 2025-04-22 NOTE — ASSESSMENT & PLAN NOTE
Chest x-ray from recent ER visit revealed cardiomegaly.  Patient does have a history of high blood pressure.  He was started on lisinopril which caused bothersome coughing spells.  I discontinued lisinopril today and started him on losartan 25 mg daily.  Patient has had lower extremity edema in the past; however, he is not experiencing edema today.  We discussed dietary recommendations such as decreased sodium, fresh fruits and vegetables, and increased hydration.  Patient verbalizes understanding.  -I recommend establishing with cardiology for further evaluation. -Any guidance moving forward is greatly appreciated  -In the interim I have ordered an echocardiogram, and hemoglobin A1c    Orders:    REFERRAL TO CARDIOLOGY    losartan (COZAAR) 25 MG Tab; Take 1 Tablet by mouth every day.    EC-ECHOCARDIOGRAM COMPLETE W/O CONT; Future

## 2025-04-22 NOTE — PROGRESS NOTES
Subjective  Lauro Daley is a 49 y.o. male who presents today for follow up of hypertension.     Patient was in the emergency department on 4/9/2025 for increased congestion, and he tested positive for COVID. He continues to have some congestion, otherwise his symptoms have resolved.     He was started on Lisinopril for hypertension in the ED. He was told to take Lisinopril 10mg  BID, however he has been taking once a day on most days due to increased coughing spells. His blood pressure in clinic today is 126/64.  His at home blood pressure readings have been between 146-160 systolic and 70-80 diastolic.    He is currently waiting to undergo further sleep apnea testing.  Currently uses a CPAP but may need a BiPAP, due to recent COVID symptoms he has been unable to reschedule.    Patient reports that he undergoes testosterone testing through urology in [place] for continued testosterone supplementation.  He will be following up with them soon to discuss recent elevated testosterone levels that could be contributing to symptoms.      Family History   Problem Relation Age of Onset    Diabetes Paternal Aunt     Diabetes Paternal Grandfather        Social History     Socioeconomic History    Marital status:      Spouse name: Not on file    Number of children: Not on file    Years of education: Not on file    Highest education level: Not on file   Occupational History    Not on file   Tobacco Use    Smoking status: Never    Smokeless tobacco: Current     Types: Chew   Vaping Use    Vaping status: Never Used   Substance and Sexual Activity    Alcohol use: Yes    Drug use: No    Sexual activity: Yes     Partners: Female     Birth control/protection: None   Other Topics Concern    Not on file   Social History Narrative    Not on file     Social Drivers of Health     Financial Resource Strain: Not on file   Food Insecurity: Not on file   Transportation Needs: Not on file   Physical Activity: Not on file    Stress: Not on file   Social Connections: Not on file   Intimate Partner Violence: Not on file   Housing Stability: Not on file       Past Surgical History:   Procedure Laterality Date    PB INCISE FINGER TENDON SHEATH Left 8/29/2023    Procedure: LEFT THUMB TRIGGER RELEASE;  Surgeon: Daisha Wilhelm M.D.;  Location: [facility];  Service: Orthopedics    DC NEUROPLASTY & OR TRANSPOS MEDIAN NRV CARPAL SERVANDO Left 12/27/2022    Procedure: LEFT HAND OPEN CARPAL TUNNEL RELEASE;  Surgeon: Daisha Wilhelm M.D.;  Location: [facility];  Service: Orthopedics    DC NEUROPLASTY & OR TRANSPOS MEDIAN NRV CARPAL SERVANDO Right 12/6/2022    Procedure: RIGHT HAND OPEN CARPAL TUNNEL RELEASE;  Surgeon: Daisha Wilhelm M.D.;  Location: [facility];  Service: Orthopedics    OTHER      sleep apnea surgery       Past Medical History:   Diagnosis Date    Acute maxillary sinusitis 4/1/2021    Asthma     as a child    Asthma exacerbation 4/13/2014    Hypoxia 4/13/2014    Methamphetamine abuse in remission (HCC) 7/9/2020    Obesity (BMI 30-39.9) 6/24/2018       Current Outpatient Medications   Medication Sig Dispense Refill    losartan (COZAAR) 25 MG Tab Take 1 Tablet by mouth every day. 100 Tablet 3    testosterone cypionate (DEPO-TESTOSTERONE) 200 MG/ML injection Inject 200 mg into the shoulder, thigh, or buttocks one time.      pantoprazole (PROTONIX) 40 MG Tablet Delayed Response Take 1 Tablet by mouth every day. 90 Tablet 3    sildenafil citrate (VIAGRA) 50 MG tablet Take 1 Tablet by mouth 1 time a day as needed for Erectile Dysfunction. 10 Tablet 3    tamsulosin (FLOMAX) 0.4 MG capsule Take 0.4 mg by mouth 1/2 hour after breakfast.       No current facility-administered medications for this visit.       Allergies   Allergen Reactions    Sulfa Drugs Rash     body       Objective  /64 (BP Location: Right arm, Patient Position: Sitting, BP Cuff Size: Adult)   Pulse (!) 104   Temp  "36.7 °C (98.1 °F) (Temporal)   Resp 16   Ht 1.93 m (6' 4\")   Wt 120 kg (263 lb 10.7 oz)   SpO2 94%   Physical Exam  Constitutional:       Appearance: Normal appearance.   HENT:      Right Ear: Tympanic membrane, ear canal and external ear normal. There is no impacted cerumen.      Left Ear: Tympanic membrane, ear canal and external ear normal. There is no impacted cerumen.      Mouth/Throat:      Mouth: Mucous membranes are moist.   Eyes:      Extraocular Movements: Extraocular movements intact.      Pupils: Pupils are equal, round, and reactive to light.   Cardiovascular:      Rate and Rhythm: Regular rhythm.   Pulmonary:      Effort: Pulmonary effort is normal.      Breath sounds: Normal breath sounds.   Skin:     General: Skin is warm and dry.   Neurological:      Mental Status: He is alert.   Psychiatric:         Mood and Affect: Mood normal.         Behavior: Behavior normal.         Thought Content: Thought content normal.         Labs:     Component      Latest Ref Rng 2025   GFR (CKD-EPI)      >60 mL/min/1.73 m 2 75      Pending TSH with reflex to free T4, hemoglobin A1c, and lipid profile.   Patient is undergoing PSA testing as well as testosterone testing through urology [place]    Imagin25 DX-CHEST-PORTABLE (1 VIEW)  IMPRESSION:  1.  Linear bibasilar atelectasis.  2.  Cardiomegaly.      Assessment    Assessment & Plan  Primary hypertension  Chest x-ray from recent ER visit revealed cardiomegaly.  Patient does have a history of high blood pressure.  He was started on lisinopril which caused bothersome coughing spells.  I discontinued lisinopril today and started him on losartan 25 mg daily.  Patient has had lower extremity edema in the past; however, he is not experiencing edema today.  We discussed dietary recommendations such as decreased sodium, fresh fruits and vegetables, and increased hydration.  Patient verbalizes understanding.  -I recommend establishing with cardiology for further " evaluation. -Any guidance moving forward is greatly appreciated  -In the interim I have ordered an echocardiogram, and hemoglobin A1c    Orders:    REFERRAL TO CARDIOLOGY    losartan (COZAAR) 25 MG Tab; Take 1 Tablet by mouth every day.    EC-ECHOCARDIOGRAM COMPLETE W/O CONT; Future    Cardiomegaly  Chest x-ray from recent ER visit revealed cardiomegaly.  Orders:    REFERRAL TO CARDIOLOGY    EC-ECHOCARDIOGRAM COMPLETE W/O CONT; Future    Colon cancer screening  We discussed Cologuard versus colonoscopy for colon cancer screening.  Patient would like to proceed with Cologuard.  He has the kit at home and will be proceeding with the necessary steps to evaluate his stool.  He was encouraged to contact me with any further questions or concerns.         Mixed hyperlipidemia    Orders:    REFERRAL TO CARDIOLOGY    Lipoprotein (a); Future    APOLIPOPROTEIN B; Future          Plan    1. Primary hypertension  - REFERRAL TO CARDIOLOGY  - losartan (COZAAR) 25 MG Tab; Take 1 Tablet by mouth every day.  Dispense: 100 Tablet; Refill: 3  - EC-ECHOCARDIOGRAM COMPLETE W/O CONT; Future    2. Cardiomegaly  - REFERRAL TO CARDIOLOGY  - EC-ECHOCARDIOGRAM COMPLETE W/O CONT; Future    3. Colon cancer screening    4. Mixed hyperlipidemia  - REFERRAL TO CARDIOLOGY  - Lipoprotein (a); Future  - APOLIPOPROTEIN B; Future

## 2025-04-29 ENCOUNTER — TELEPHONE (OUTPATIENT)
Dept: HEALTH INFORMATION MANAGEMENT | Facility: OTHER | Age: 50
End: 2025-04-29
Payer: COMMERCIAL

## 2025-05-03 LAB — NONINV COLON CA DNA+OCC BLD SCRN STL QL: NEGATIVE

## 2025-05-05 ENCOUNTER — RESULTS FOLLOW-UP (OUTPATIENT)
Dept: MEDICAL GROUP | Facility: LAB | Age: 50
End: 2025-05-05

## 2025-06-10 ENCOUNTER — APPOINTMENT (OUTPATIENT)
Facility: MEDICAL CENTER | Age: 50
End: 2025-06-10
Attending: PHYSICIAN ASSISTANT
Payer: COMMERCIAL

## 2025-06-24 DIAGNOSIS — I10 PRIMARY HYPERTENSION: ICD-10-CM

## 2025-06-24 RX ORDER — LOSARTAN POTASSIUM 25 MG/1
25 TABLET ORAL DAILY
Qty: 100 TABLET | Refills: 3 | Status: SHIPPED | OUTPATIENT
Start: 2025-06-24 | End: 2026-07-29

## 2025-07-01 DIAGNOSIS — I10 PRIMARY HYPERTENSION: Primary | ICD-10-CM

## 2025-07-01 RX ORDER — LOSARTAN POTASSIUM 50 MG/1
50 TABLET ORAL DAILY
Qty: 100 TABLET | Refills: 3 | Status: SHIPPED | OUTPATIENT
Start: 2025-07-01 | End: 2026-08-05